# Patient Record
Sex: MALE | Race: WHITE | URBAN - METROPOLITAN AREA
[De-identification: names, ages, dates, MRNs, and addresses within clinical notes are randomized per-mention and may not be internally consistent; named-entity substitution may affect disease eponyms.]

---

## 2017-01-04 PROBLEM — R42 VERTIGO: Status: ACTIVE | Noted: 2017-01-04

## 2017-01-17 ENCOUNTER — APPOINTMENT (RX ONLY)
Dept: URBAN - METROPOLITAN AREA CLINIC 24 | Facility: CLINIC | Age: 72
Setting detail: DERMATOLOGY
End: 2017-01-17

## 2017-01-17 DIAGNOSIS — L57.0 ACTINIC KERATOSIS: ICD-10-CM

## 2017-01-17 DIAGNOSIS — D18.0 HEMANGIOMA: ICD-10-CM

## 2017-01-17 DIAGNOSIS — Z85.828 PERSONAL HISTORY OF OTHER MALIGNANT NEOPLASM OF SKIN: ICD-10-CM

## 2017-01-17 DIAGNOSIS — L81.4 OTHER MELANIN HYPERPIGMENTATION: ICD-10-CM

## 2017-01-17 PROBLEM — D18.01 HEMANGIOMA OF SKIN AND SUBCUTANEOUS TISSUE: Status: ACTIVE | Noted: 2017-01-17

## 2017-01-17 PROCEDURE — 99213 OFFICE O/P EST LOW 20 MIN: CPT | Mod: 25

## 2017-01-17 PROCEDURE — 17000 DESTRUCT PREMALG LESION: CPT

## 2017-01-17 PROCEDURE — ? LIQUID NITROGEN

## 2017-01-17 PROCEDURE — ? COUNSELING

## 2017-01-17 PROCEDURE — 17003 DESTRUCT PREMALG LES 2-14: CPT

## 2017-01-17 ASSESSMENT — LOCATION ZONE DERM
LOCATION ZONE: TRUNK
LOCATION ZONE: FACE
LOCATION ZONE: HAND
LOCATION ZONE: ARM
LOCATION ZONE: EAR

## 2017-01-17 ASSESSMENT — LOCATION SIMPLE DESCRIPTION DERM
LOCATION SIMPLE: RIGHT EAR
LOCATION SIMPLE: LEFT FOREHEAD
LOCATION SIMPLE: LEFT FOREARM
LOCATION SIMPLE: LEFT UPPER BACK
LOCATION SIMPLE: RIGHT FOREHEAD
LOCATION SIMPLE: LEFT CHEEK
LOCATION SIMPLE: LEFT HAND
LOCATION SIMPLE: RIGHT TEMPLE
LOCATION SIMPLE: UPPER BACK
LOCATION SIMPLE: ABDOMEN
LOCATION SIMPLE: RIGHT CHEEK
LOCATION SIMPLE: RIGHT HAND
LOCATION SIMPLE: LEFT TEMPLE

## 2017-01-17 ASSESSMENT — LOCATION DETAILED DESCRIPTION DERM
LOCATION DETAILED: LEFT ULNAR DORSAL HAND
LOCATION DETAILED: SUPERIOR THORACIC SPINE
LOCATION DETAILED: LEFT INFERIOR UPPER BACK
LOCATION DETAILED: LEFT RADIAL DORSAL HAND
LOCATION DETAILED: LEFT SUPERIOR MEDIAL FOREHEAD
LOCATION DETAILED: LEFT FOREHEAD
LOCATION DETAILED: RIGHT MID PREAURICULAR CHEEK
LOCATION DETAILED: LEFT DISTAL DORSAL FOREARM
LOCATION DETAILED: RIGHT RADIAL DORSAL HAND
LOCATION DETAILED: LEFT PROXIMAL DORSAL FOREARM
LOCATION DETAILED: LEFT SUPERIOR LATERAL MALAR CHEEK
LOCATION DETAILED: RIGHT SCAPHA
LOCATION DETAILED: LEFT CENTRAL TEMPLE
LOCATION DETAILED: EPIGASTRIC SKIN
LOCATION DETAILED: RIGHT INFERIOR FOREHEAD
LOCATION DETAILED: RIGHT SUPERIOR MEDIAL FOREHEAD
LOCATION DETAILED: RIGHT CENTRAL TEMPLE

## 2017-01-17 NOTE — PROCEDURE: LIQUID NITROGEN
Post-Care Instructions: I reviewed with the patient in detail post-care instructions. Patient is to wear sunprotection, and avoid picking at any of the treated lesions. Pt may apply Vaseline to crusted or scabbing areas.
Duration Of Freeze Thaw-Cycle (Seconds): 0
Render Post-Care Instructions In Note?: no
Consent: The patient's consent was obtained including but not limited to risks of crusting, scabbing, blistering, scarring, darker or lighter pigmentary change, recurrence, incomplete removal and infection.
Detail Level: Detailed

## 2017-02-06 PROBLEM — Z98.890 HISTORY OF TYMPANOPLASTY OF LEFT EAR: Status: ACTIVE | Noted: 2017-02-06

## 2017-02-07 PROBLEM — M54.41 ACUTE RIGHT-SIDED LOW BACK PAIN WITH RIGHT-SIDED SCIATICA: Status: ACTIVE | Noted: 2017-02-07

## 2017-02-07 PROBLEM — M54.42 ACUTE LEFT-SIDED LOW BACK PAIN WITH LEFT-SIDED SCIATICA: Status: ACTIVE | Noted: 2017-02-07

## 2017-03-02 PROBLEM — E11.9 CONTROLLED TYPE 2 DIABETES MELLITUS WITHOUT COMPLICATION, WITHOUT LONG-TERM CURRENT USE OF INSULIN (HCC): Status: ACTIVE | Noted: 2017-03-02

## 2017-03-13 PROBLEM — Z13.39 SCREENING FOR ALCOHOLISM: Status: ACTIVE | Noted: 2017-03-13

## 2017-06-13 PROBLEM — M54.42 ACUTE LEFT-SIDED LOW BACK PAIN WITH LEFT-SIDED SCIATICA: Status: RESOLVED | Noted: 2017-02-07 | Resolved: 2017-06-13

## 2017-06-13 PROBLEM — R00.2 HEART PALPITATIONS: Status: ACTIVE | Noted: 2017-06-13

## 2017-06-13 PROBLEM — H43.399 FLOATERS: Status: ACTIVE | Noted: 2017-06-13

## 2017-08-15 ENCOUNTER — APPOINTMENT (RX ONLY)
Dept: URBAN - METROPOLITAN AREA CLINIC 24 | Facility: CLINIC | Age: 72
Setting detail: DERMATOLOGY
End: 2017-08-15

## 2017-08-15 DIAGNOSIS — L81.4 OTHER MELANIN HYPERPIGMENTATION: ICD-10-CM

## 2017-08-15 DIAGNOSIS — Z85.828 PERSONAL HISTORY OF OTHER MALIGNANT NEOPLASM OF SKIN: ICD-10-CM

## 2017-08-15 DIAGNOSIS — L57.0 ACTINIC KERATOSIS: ICD-10-CM

## 2017-08-15 DIAGNOSIS — L82.1 OTHER SEBORRHEIC KERATOSIS: ICD-10-CM

## 2017-08-15 PROBLEM — E78.5 HYPERLIPIDEMIA, UNSPECIFIED: Status: ACTIVE | Noted: 2017-08-15

## 2017-08-15 PROCEDURE — ? COUNSELING

## 2017-08-15 PROCEDURE — 99213 OFFICE O/P EST LOW 20 MIN: CPT | Mod: 25

## 2017-08-15 PROCEDURE — ? LIQUID NITROGEN

## 2017-08-15 PROCEDURE — 17000 DESTRUCT PREMALG LESION: CPT

## 2017-08-15 PROCEDURE — 17003 DESTRUCT PREMALG LES 2-14: CPT

## 2017-08-15 ASSESSMENT — LOCATION SIMPLE DESCRIPTION DERM
LOCATION SIMPLE: RIGHT UPPER BACK
LOCATION SIMPLE: LEFT SCALP
LOCATION SIMPLE: RIGHT HAND
LOCATION SIMPLE: RIGHT FOREARM
LOCATION SIMPLE: LEFT FOREARM
LOCATION SIMPLE: RIGHT CHEEK
LOCATION SIMPLE: RIGHT ZYGOMA
LOCATION SIMPLE: RIGHT FOREHEAD
LOCATION SIMPLE: LEFT HAND
LOCATION SIMPLE: LEFT FOREHEAD
LOCATION SIMPLE: LEFT CHEEK

## 2017-08-15 ASSESSMENT — LOCATION DETAILED DESCRIPTION DERM
LOCATION DETAILED: RIGHT DISTAL DORSAL FOREARM
LOCATION DETAILED: LEFT DISTAL DORSAL FOREARM
LOCATION DETAILED: RIGHT CENTRAL ZYGOMA
LOCATION DETAILED: RIGHT LATERAL UPPER BACK
LOCATION DETAILED: LEFT PROXIMAL DORSAL FOREARM
LOCATION DETAILED: RIGHT ULNAR DORSAL HAND
LOCATION DETAILED: LEFT SUPERIOR PREAURICULAR CHEEK
LOCATION DETAILED: RIGHT FOREHEAD
LOCATION DETAILED: LEFT ULNAR DORSAL HAND
LOCATION DETAILED: LEFT MEDIAL FRONTAL SCALP
LOCATION DETAILED: RIGHT CENTRAL MALAR CHEEK
LOCATION DETAILED: LEFT INFERIOR LATERAL FOREHEAD
LOCATION DETAILED: LEFT RADIAL DORSAL HAND
LOCATION DETAILED: RIGHT SUPERIOR UPPER BACK
LOCATION DETAILED: LEFT FOREHEAD

## 2017-08-15 ASSESSMENT — LOCATION ZONE DERM
LOCATION ZONE: SCALP
LOCATION ZONE: ARM
LOCATION ZONE: FACE
LOCATION ZONE: HAND
LOCATION ZONE: TRUNK

## 2017-08-15 NOTE — PROCEDURE: MIPS QUALITY
Quality 111:Pneumonia Vaccination Status For Older Adults: Pneumococcal Vaccination not Administered or Previously Received, Reason not Otherwise Specified
Quality 110: Preventive Care And Screening: Influenza Immunization: Influenza Immunization previously received during influenza season
Quality 130: Documentation Of Current Medications In The Medical Record: Current Medications Documented
Detail Level: Detailed

## 2017-09-11 PROBLEM — H60.90 OTITIS EXTERNA: Status: ACTIVE | Noted: 2017-09-11

## 2017-09-19 PROBLEM — Z23 ENCOUNTER FOR IMMUNIZATION: Status: ACTIVE | Noted: 2017-09-19

## 2017-09-19 PROBLEM — E66.9 OBESITY (BMI 30.0-34.9): Status: ACTIVE | Noted: 2017-09-19

## 2017-12-19 PROBLEM — H60.90 OTITIS EXTERNA: Status: RESOLVED | Noted: 2017-09-11 | Resolved: 2017-12-19

## 2017-12-19 PROBLEM — N40.0 BENIGN PROSTATIC HYPERPLASIA WITHOUT LOWER URINARY TRACT SYMPTOMS: Status: ACTIVE | Noted: 2017-12-19

## 2018-02-15 ENCOUNTER — APPOINTMENT (RX ONLY)
Dept: URBAN - METROPOLITAN AREA CLINIC 24 | Facility: CLINIC | Age: 73
Setting detail: DERMATOLOGY
End: 2018-02-15

## 2018-02-15 DIAGNOSIS — L82.1 OTHER SEBORRHEIC KERATOSIS: ICD-10-CM

## 2018-02-15 DIAGNOSIS — L57.0 ACTINIC KERATOSIS: ICD-10-CM

## 2018-02-15 DIAGNOSIS — D485 NEOPLASM OF UNCERTAIN BEHAVIOR OF SKIN: ICD-10-CM

## 2018-02-15 DIAGNOSIS — Z85.828 PERSONAL HISTORY OF OTHER MALIGNANT NEOPLASM OF SKIN: ICD-10-CM

## 2018-02-15 PROBLEM — D48.5 NEOPLASM OF UNCERTAIN BEHAVIOR OF SKIN: Status: ACTIVE | Noted: 2018-02-15

## 2018-02-15 PROCEDURE — ? BIOPSY BY SHAVE METHOD

## 2018-02-15 PROCEDURE — ? LIQUID NITROGEN

## 2018-02-15 PROCEDURE — 99213 OFFICE O/P EST LOW 20 MIN: CPT | Mod: 25

## 2018-02-15 PROCEDURE — ? COUNSELING

## 2018-02-15 PROCEDURE — 17003 DESTRUCT PREMALG LES 2-14: CPT

## 2018-02-15 PROCEDURE — 17000 DESTRUCT PREMALG LESION: CPT

## 2018-02-15 PROCEDURE — 11100: CPT | Mod: 59

## 2018-02-15 ASSESSMENT — LOCATION DETAILED DESCRIPTION DERM
LOCATION DETAILED: RIGHT SUPERIOR MEDIAL FOREHEAD
LOCATION DETAILED: LEFT LATERAL INFERIOR CHEST
LOCATION DETAILED: RIGHT CENTRAL ZYGOMA
LOCATION DETAILED: LEFT LATERAL ZYGOMA
LOCATION DETAILED: LEFT FOREHEAD
LOCATION DETAILED: RIGHT SUPERIOR UPPER BACK
LOCATION DETAILED: LEFT CENTRAL TEMPLE
LOCATION DETAILED: LEFT LATERAL MALAR CHEEK
LOCATION DETAILED: RIGHT INFERIOR LATERAL FOREHEAD
LOCATION DETAILED: RIGHT SUPERIOR FOREHEAD
LOCATION DETAILED: RIGHT INFERIOR FOREHEAD
LOCATION DETAILED: LEFT SUPERIOR TEMPLE
LOCATION DETAILED: LEFT SUPERIOR MEDIAL FOREHEAD
LOCATION DETAILED: LEFT ULNAR DORSAL HAND

## 2018-02-15 ASSESSMENT — LOCATION SIMPLE DESCRIPTION DERM
LOCATION SIMPLE: RIGHT FOREHEAD
LOCATION SIMPLE: LEFT HAND
LOCATION SIMPLE: LEFT ZYGOMA
LOCATION SIMPLE: RIGHT ZYGOMA
LOCATION SIMPLE: CHEST
LOCATION SIMPLE: LEFT FOREHEAD
LOCATION SIMPLE: LEFT TEMPLE
LOCATION SIMPLE: LEFT CHEEK
LOCATION SIMPLE: RIGHT UPPER BACK

## 2018-02-15 ASSESSMENT — LOCATION ZONE DERM
LOCATION ZONE: FACE
LOCATION ZONE: TRUNK
LOCATION ZONE: HAND

## 2018-02-15 NOTE — PROCEDURE: BIOPSY BY SHAVE METHOD
Notification Instructions: Patient will be notified of biopsy results. However, patient instructed to call the office if not contacted within 2 weeks.
Billing Type: Third-Party Bill
Biopsy Method: Dermablade
Hemostasis: Aluminum Chloride
Additional Anesthesia Volume In Cc (Will Not Render If 0): 0
Accession #: [id number]
Dressing: bandage
Cryotherapy Text: The wound bed was treated with cryotherapy after the biopsy was performed.
Biopsy Type: H and E
Electrodesiccation Text: The wound bed was treated with electrodesiccation after the biopsy was performed.
Type Of Destruction Used: Curettage
Bill For Surgical Tray: no
Electrodesiccation And Curettage Text: The wound bed was treated with electrodesiccation and curettage after the biopsy was performed.
Detail Level: Detailed
Consent: Written consent was obtained and risks were reviewed including but not limited to scarring, infection, bleeding, scabbing, incomplete removal, and allergy to anesthesia.
Anesthesia Volume In Cc: 0.3
Post-care instructions were reviewed in detail and written instructions are provided. Patient is to keep the biopsy site dry overnight, and then apply bacitracin twice daily until healed. Patient may apply hydrogen peroxide soaks to remove any crusting.
Silver Nitrate Text: The wound bed was treated with silver nitrate after the biopsy was performed.
Wound Care: Vaseline
Anesthesia Type: 1% lidocaine with epinephrine

## 2018-03-19 ENCOUNTER — HOSPITAL ENCOUNTER (OUTPATIENT)
Dept: GENERAL RADIOLOGY | Age: 73
Discharge: HOME | End: 2018-03-19
Payer: MEDICARE

## 2018-03-19 DIAGNOSIS — M25.562 PAIN IN LATERAL PORTION OF LEFT KNEE: ICD-10-CM

## 2018-03-19 PROCEDURE — 73562 X-RAY EXAM OF KNEE 3: CPT

## 2018-03-19 NOTE — PROGRESS NOTES
XR looks OK other than some arthritis, the next step would be an ultrasound, or we can wait and see how it goes, it's up to him.

## 2018-03-29 ENCOUNTER — APPOINTMENT (RX ONLY)
Dept: URBAN - METROPOLITAN AREA CLINIC 24 | Facility: CLINIC | Age: 73
Setting detail: DERMATOLOGY
End: 2018-03-29

## 2018-03-29 PROBLEM — D04.5 CARCINOMA IN SITU OF SKIN OF TRUNK: Status: ACTIVE | Noted: 2018-03-29

## 2018-03-29 PROCEDURE — 11602 EXC TR-EXT MAL+MARG 1.1-2 CM: CPT

## 2018-03-29 PROCEDURE — 12032 INTMD RPR S/A/T/EXT 2.6-7.5: CPT

## 2018-03-29 PROCEDURE — ? EXCISION

## 2018-03-29 NOTE — PROCEDURE: EXCISION
Dressing: pressure dressing
H Plasty Text: Given the location of the defect, shape of the defect and the proximity to free margins a H-plasty was deemed most appropriate for repair.  Using a sterile surgical marker, the appropriate advancement arms of the H-plasty were drawn incorporating the defect and placing the expected incisions within the relaxed skin tension lines where possible. The area thus outlined was incised deep to adipose tissue with a #15 scalpel blade. The skin margins were undermined to an appropriate distance in all directions utilizing iris scissors.  The opposing advancement arms were then advanced into place in opposite direction and anchored with interrupted buried subcutaneous sutures.
Island Pedicle Flap With Canthal Suspension Text: The defect edges were debeveled with a #15 scalpel blade.  Given the location of the defect, shape of the defect and the proximity to free margins an island pedicle advancement flap was deemed most appropriate.  Using a sterile surgical marker, an appropriate advancement flap was drawn incorporating the defect, outlining the appropriate donor tissue and placing the expected incisions within the relaxed skin tension lines where possible. The area thus outlined was incised deep to adipose tissue with a #15 scalpel blade.  The skin margins were undermined to an appropriate distance in all directions around the primary defect and laterally outward around the island pedicle utilizing iris scissors.  There was minimal undermining beneath the pedicle flap. A suspension suture was placed in the canthal tendon to prevent tension and prevent ectropion.
Lip Wedge Excision Repair Text: Given the location of the defect and the proximity to free margins a full thickness wedge repair was deemed most appropriate.  Using a sterile surgical marker, the appropriate repair was drawn incorporating the defect and placing the expected incisions perpendicular to the vermillion border.  The vermillion border was also meticulously outlined to ensure appropriate reapproximation during the repair.  The area thus outlined was incised through and through with a #15 scalpel blade.  The muscularis and dermis were reaproximated with deep sutures following hemostasis. Care was taken to realign the vermillion border before proceeding with the superficial closure.  Once the vermillion was realigned the superfical and mucosal closure was finished.
Bilateral Helical Rim Advancement Flap Text: The defect edges were debeveled with a #15 blade scalpel.  Given the location of the defect and the proximity to free margins (helical rim) a bilateral helical rim advancement flap was deemed most appropriate.  Using a sterile surgical marker, the appropriate advancement flaps were drawn incorporating the defect and placing the expected incisions between the helical rim and antihelix where possible.  The area thus outlined was incised through and through with a #15 scalpel blade.  With a skin hook and iris scissors, the flaps were gently and sharply undermined and freed up.
Primary Defect Length (In Cm): 0
Trilobed Flap Text: The defect edges were debeveled with a #15 scalpel blade.  Given the location of the defect and the proximity to free margins a trilobed flap was deemed most appropriate.  Using a sterile surgical marker, an appropriate trilobed flap drawn around the defect.    The area thus outlined was incised deep to adipose tissue with a #15 scalpel blade.  The skin margins were undermined to an appropriate distance in all directions utilizing iris scissors.
Anesthesia Type: 1% lidocaine with epinephrine
Show Accession Variable: Yes
Cheek Interpolation Flap Text: A decision was made to reconstruct the defect utilizing an interpolation axial flap and a staged reconstruction.  A telfa template was made of the defect.  This telfa template was then used to outline the Cheek Interpolation flap.  The donor area for the pedicle flap was then injected with anesthesia.  The flap was excised through the skin and subcutaneous tissue down to the layer of the underlying musculature.  The interpolation flap was carefully excised within this deep plane to maintain its blood supply.  The edges of the donor site were undermined.   The donor site was closed in a primary fashion.  The pedicle was then rotated into position and sutured.  Once the tube was sutured into place, adequate blood supply was confirmed with blanching and refill.  The pedicle was then wrapped with xeroform gauze and dressed appropriately with a telfa and gauze bandage to ensure continued blood supply and protect the attached pedicle.
Partial Purse String (Simple) Text: Given the location of the defect and the characteristics of the surrounding skin a simple purse string closure was deemed most appropriate.  Undermining was performed circumferentially around the surgical defect.  A purse string suture was then placed and tightened. Wound tension of the circular defect prevented complete closure of the wound.
Accession #: [id number]
Modified Advancement Flap Text: The defect edges were debeveled with a #15 scalpel blade.  Given the location of the defect, shape of the defect and the proximity to free margins a modified advancement flap was deemed most appropriate.  Using a sterile surgical marker, an appropriate advancement flap was drawn incorporating the defect and placing the expected incisions within the relaxed skin tension lines where possible.    The area thus outlined was incised deep to adipose tissue with a #15 scalpel blade.  The skin margins were undermined to an appropriate distance in all directions utilizing iris scissors.
O-T Plasty Text: The defect edges were debeveled with a #15 scalpel blade.  Given the location of the defect, shape of the defect and the proximity to free margins an O-T plasty was deemed most appropriate.  Using a sterile surgical marker, an appropriate O-T plasty was drawn incorporating the defect and placing the expected incisions within the relaxed skin tension lines where possible.    The area thus outlined was incised deep to adipose tissue with a #15 scalpel blade.  The skin margins were undermined to an appropriate distance in all directions utilizing iris scissors.
Path Notes (To The Dermatopathologist): Please check margins
Z Plasty Text: The lesion was extirpated to the level of the fat with a #15 scalpel blade.  Given the location of the defect, shape of the defect and the proximity to free margins a Z-plasty was deemed most appropriate for repair.  Using a sterile surgical marker, the appropriate transposition arms of the Z-plasty were drawn incorporating the defect and placing the expected incisions within the relaxed skin tension lines where possible.    The area thus outlined was incised deep to adipose tissue with a #15 scalpel blade.  The skin margins were undermined to an appropriate distance in all directions utilizing iris scissors.  The opposing transposition arms were then transposed into place in opposite direction and anchored with interrupted buried subcutaneous sutures.
Partial Purse String (Intermediate) Text: Given the location of the defect and the characteristics of the surrounding skin an intermediate purse string closure was deemed most appropriate.  Undermining was performed circumferentially around the surgical defect.  A purse string suture was then placed and tightened. Wound tension of the circular defect prevented complete closure of the wound.
Tissue Cultured Epidermal Autograft Text: The defect edges were debeveled with a #15 scalpel blade.  Given the location of the defect, shape of the defect and the proximity to free margins a tissue cultured epidermal autograft was deemed most appropriate.  The graft was then trimmed to fit the size of the defect.  The graft was then placed in the primary defect and oriented appropriately.
V-Y Plasty Text: The defect edges were debeveled with a #15 scalpel blade.  Given the location of the defect, shape of the defect and the proximity to free margins an V-Y advancement flap was deemed most appropriate.  Using a sterile surgical marker, an appropriate advancement flap was drawn incorporating the defect and placing the expected incisions within the relaxed skin tension lines where possible.    The area thus outlined was incised deep to adipose tissue with a #15 scalpel blade.  The skin margins were undermined to an appropriate distance in all directions utilizing iris scissors.
Complex Repair And Double M Plasty Text: The defect edges were debeveled with a #15 scalpel blade.  The primary defect was closed partially with a complex linear closure.  Given the location of the remaining defect, shape of the defect and the proximity to free margins a double M plasty was deemed most appropriate for complete closure of the defect.  Using a sterile surgical marker, an appropriate advancement flap was drawn incorporating the defect and placing the expected incisions within the relaxed skin tension lines where possible.    The area thus outlined was incised deep to adipose tissue with a #15 scalpel blade.  The skin margins were undermined to an appropriate distance in all directions utilizing iris scissors.
Advancement Flap (Single) Text: The defect edges were debeveled with a #15 scalpel blade.  Given the location of the defect and the proximity to free margins a single advancement flap was deemed most appropriate.  Using a sterile surgical marker, an appropriate advancement flap was drawn incorporating the defect and placing the expected incisions within the relaxed skin tension lines where possible.    The area thus outlined was incised deep to adipose tissue with a #15 scalpel blade.  The skin margins were undermined to an appropriate distance in all directions utilizing iris scissors.
Complex Repair And O-L Flap Text: The defect edges were debeveled with a #15 scalpel blade.  The primary defect was closed partially with a complex linear closure.  Given the location of the remaining defect, shape of the defect and the proximity to free margins an O-L flap was deemed most appropriate for complete closure of the defect.  Using a sterile surgical marker, an appropriate flap was drawn incorporating the defect and placing the expected incisions within the relaxed skin tension lines where possible.    The area thus outlined was incised deep to adipose tissue with a #15 scalpel blade.  The skin margins were undermined to an appropriate distance in all directions utilizing iris scissors.
Purse String (Simple) Text: Given the location of the defect and the characteristics of the surrounding skin a purse string simple closure was deemed most appropriate.  Undermining was performed circumferentially around the surgical defect.  A purse string suture was then placed and tightened.
Complex Repair And Modified Advancement Flap Text: The defect edges were debeveled with a #15 scalpel blade.  The primary defect was closed partially with a complex linear closure.  Given the location of the remaining defect, shape of the defect and the proximity to free margins a modified advancement flap was deemed most appropriate for complete closure of the defect.  Using a sterile surgical marker, an appropriate advancement flap was drawn incorporating the defect and placing the expected incisions within the relaxed skin tension lines where possible.    The area thus outlined was incised deep to adipose tissue with a #15 scalpel blade.  The skin margins were undermined to an appropriate distance in all directions utilizing iris scissors.
Scalpel Size: 15 blade
Spiral Flap Text: The defect edges were debeveled with a #15 scalpel blade.  Given the location of the defect, shape of the defect and the proximity to free margins a spiral flap was deemed most appropriate.  Using a sterile surgical marker, an appropriate rotation flap was drawn incorporating the defect and placing the expected incisions within the relaxed skin tension lines where possible. The area thus outlined was incised deep to adipose tissue with a #15 scalpel blade.  The skin margins were undermined to an appropriate distance in all directions utilizing iris scissors.
Complex Repair And Xenograft Text: The defect edges were debeveled with a #15 scalpel blade.  The primary defect was closed partially with a complex linear closure.  Given the location of the defect, shape of the defect and the proximity to free margins an tissue cultured epidermal autograft was deemed most appropriate to repair the remaining defect.  The graft was trimmed to fit the size of the remaining defect.  The graft was then placed in the primary defect, oriented appropriately, and sutured into place.
Complex Repair And Split-Thickness Skin Graft Text: The defect edges were debeveled with a #15 scalpel blade.  The primary defect was closed partially with a complex linear closure.  Given the location of the defect, shape of the defect and the proximity to free margins a split thickness skin graft was deemed most appropriate to repair the remaining defect.  The graft was trimmed to fit the size of the remaining defect.  The graft was then placed in the primary defect, oriented appropriately, and sutured into place.
O-L Flap Text: The defect edges were debeveled with a #15 scalpel blade.  Given the location of the defect, shape of the defect and the proximity to free margins an O-L flap was deemed most appropriate.  Using a sterile surgical marker, an appropriate advancement flap was drawn incorporating the defect and placing the expected incisions within the relaxed skin tension lines where possible.    The area thus outlined was incised deep to adipose tissue with a #15 scalpel blade.  The skin margins were undermined to an appropriate distance in all directions utilizing iris scissors.
Complex Repair And W Plasty Text: The defect edges were debeveled with a #15 scalpel blade.  The primary defect was closed partially with a complex linear closure.  Given the location of the remaining defect, shape of the defect and the proximity to free margins a W plasty was deemed most appropriate for complete closure of the defect.  Using a sterile surgical marker, an appropriate advancement flap was drawn incorporating the defect and placing the expected incisions within the relaxed skin tension lines where possible.    The area thus outlined was incised deep to adipose tissue with a #15 scalpel blade.  The skin margins were undermined to an appropriate distance in all directions utilizing iris scissors.
Intermediate Repair Preamble Text (Leave Blank If You Do Not Want): Undermining was performed with blunt dissection.
Island Pedicle Flap Text: The defect edges were debeveled with a #15 scalpel blade.  Given the location of the defect, shape of the defect and the proximity to free margins an island pedicle advancement flap was deemed most appropriate.  Using a sterile surgical marker, an appropriate advancement flap was drawn incorporating the defect, outlining the appropriate donor tissue and placing the expected incisions within the relaxed skin tension lines where possible.    The area thus outlined was incised deep to adipose tissue with a #15 scalpel blade.  The skin margins were undermined to an appropriate distance in all directions around the primary defect and laterally outward around the island pedicle utilizing iris scissors.  There was minimal undermining beneath the pedicle flap.
O-Z Plasty Text: The defect edges were debeveled with a #15 scalpel blade.  Given the location of the defect, shape of the defect and the proximity to free margins an O-Z plasty (double transposition flap) was deemed most appropriate.  Using a sterile surgical marker, the appropriate transposition flaps were drawn incorporating the defect and placing the expected incisions within the relaxed skin tension lines where possible.    The area thus outlined was incised deep to adipose tissue with a #15 scalpel blade.  The skin margins were undermined to an appropriate distance in all directions utilizing iris scissors.  Hemostasis was achieved with electrocautery.  The flaps were then transposed into place, one clockwise and the other counterclockwise, and anchored with interrupted buried subcutaneous sutures.
Melolabial Interpolation Flap Text: A decision was made to reconstruct the defect utilizing an interpolation axial flap and a staged reconstruction.  A telfa template was made of the defect.  This telfa template was then used to outline the melolabial interpolation flap.  The donor area for the pedicle flap was then injected with anesthesia.  The flap was excised through the skin and subcutaneous tissue down to the layer of the underlying musculature.  The pedicle flap was carefully excised within this deep plane to maintain its blood supply.  The edges of the donor site were undermined.   The donor site was closed in a primary fashion.  The pedicle was then rotated into position and sutured.  Once the tube was sutured into place, adequate blood supply was confirmed with blanching and refill.  The pedicle was then wrapped with xeroform gauze and dressed appropriately with a telfa and gauze bandage to ensure continued blood supply and protect the attached pedicle.
Render Path Notes In Note?: no
Complex Repair And A-T Advancement Flap Text: The defect edges were debeveled with a #15 scalpel blade.  The primary defect was closed partially with a complex linear closure.  Given the location of the remaining defect, shape of the defect and the proximity to free margins an A-T advancement flap was deemed most appropriate for complete closure of the defect.  Using a sterile surgical marker, an appropriate advancement flap was drawn incorporating the defect and placing the expected incisions within the relaxed skin tension lines where possible.    The area thus outlined was incised deep to adipose tissue with a #15 scalpel blade.  The skin margins were undermined to an appropriate distance in all directions utilizing iris scissors.
Skin Substitute Text: The defect edges were debeveled with a #15 scalpel blade.  Given the location of the defect, shape of the defect and the proximity to free margins a skin substitute graft was deemed most appropriate.  The graft material was trimmed to fit the size of the defect. The graft was then placed in the primary defect and oriented appropriately.
Intermediate / Complex Repair - Final Wound Length In Cm: 5.2
Dorsal Nasal Flap Text: The defect edges were debeveled with a #15 scalpel blade.  Given the location of the defect and the proximity to free margins a dorsal nasal flap was deemed most appropriate.  Using a sterile surgical marker, an appropriate dorsal nasal flap was drawn around the defect.    The area thus outlined was incised deep to adipose tissue with a #15 scalpel blade.  The skin margins were undermined to an appropriate distance in all directions utilizing iris scissors.
Complex Repair And Dorsal Nasal Flap Text: The defect edges were debeveled with a #15 scalpel blade.  The primary defect was closed partially with a complex linear closure.  Given the location of the remaining defect, shape of the defect and the proximity to free margins a dorsal nasal flap was deemed most appropriate for complete closure of the defect.  Using a sterile surgical marker, an appropriate flap was drawn incorporating the defect and placing the expected incisions within the relaxed skin tension lines where possible.    The area thus outlined was incised deep to adipose tissue with a #15 scalpel blade.  The skin margins were undermined to an appropriate distance in all directions utilizing iris scissors.
Epidermal Autograft Text: The defect edges were debeveled with a #15 scalpel blade.  Given the location of the defect, shape of the defect and the proximity to free margins an epidermal autograft was deemed most appropriate.  Using a sterile surgical marker, the primary defect shape was transferred to the donor site. The epidermal graft was then harvested.  The skin graft was then placed in the primary defect and oriented appropriately.
Complex Repair And O-T Advancement Flap Text: The defect edges were debeveled with a #15 scalpel blade.  The primary defect was closed partially with a complex linear closure.  Given the location of the remaining defect, shape of the defect and the proximity to free margins an O-T advancement flap was deemed most appropriate for complete closure of the defect.  Using a sterile surgical marker, an appropriate advancement flap was drawn incorporating the defect and placing the expected incisions within the relaxed skin tension lines where possible.    The area thus outlined was incised deep to adipose tissue with a #15 scalpel blade.  The skin margins were undermined to an appropriate distance in all directions utilizing iris scissors.
Bi-Rhombic Flap Text: The defect edges were debeveled with a #15 scalpel blade.  Given the location of the defect and the proximity to free margins a bi-rhombic flap was deemed most appropriate.  Using a sterile surgical marker, an appropriate rhombic flap was drawn incorporating the defect. The area thus outlined was incised deep to adipose tissue with a #15 scalpel blade.  The skin margins were undermined to an appropriate distance in all directions utilizing iris scissors.
Hatchet Flap Text: The defect edges were debeveled with a #15 scalpel blade.  Given the location of the defect, shape of the defect and the proximity to free margins a hatchet flap was deemed most appropriate.  Using a sterile surgical marker, an appropriate hatchet flap was drawn incorporating the defect and placing the expected incisions within the relaxed skin tension lines where possible.    The area thus outlined was incised deep to adipose tissue with a #15 scalpel blade.  The skin margins were undermined to an appropriate distance in all directions utilizing iris scissors.
Crescentic Advancement Flap Text: The defect edges were debeveled with a #15 scalpel blade.  Given the location of the defect and the proximity to free margins a crescentic advancement flap was deemed most appropriate.  Using a sterile surgical marker, the appropriate advancement flap was drawn incorporating the defect and placing the expected incisions within the relaxed skin tension lines where possible.    The area thus outlined was incised deep to adipose tissue with a #15 scalpel blade.  The skin margins were undermined to an appropriate distance in all directions utilizing iris scissors.
Ftsg Text: The defect edges were debeveled with a #15 scalpel blade.  Given the location of the defect, shape of the defect and the proximity to free margins a full thickness skin graft was deemed most appropriate.  Using a sterile surgical marker, the primary defect shape was transferred to the donor site. The area thus outlined was incised deep to adipose tissue with a #15 scalpel blade.  The harvested graft was then trimmed of adipose tissue until only dermis and epidermis was left.  The skin margins of the secondary defect were undermined to an appropriate distance in all directions utilizing iris scissors.  The secondary defect was closed with interrupted buried subcutaneous sutures.  The skin edges were then re-apposed with running  sutures.  The skin graft was then placed in the primary defect and oriented appropriately.
Saucerization Excision Additional Text (Leave Blank If You Do Not Want): The margin was drawn around the clinically apparent lesion.  Incisions were then made along these lines, in a tangential fashion, to the appropriate tissue plane and the lesion was extirpated.
V-Y Flap Text: The defect edges were debeveled with a #15 scalpel blade.  Given the location of the defect, shape of the defect and the proximity to free margins a V-Y flap was deemed most appropriate.  Using a sterile surgical marker, an appropriate advancement flap was drawn incorporating the defect and placing the expected incisions within the relaxed skin tension lines where possible.    The area thus outlined was incised deep to adipose tissue with a #15 scalpel blade.  The skin margins were undermined to an appropriate distance in all directions utilizing iris scissors.
Complex Repair And Epidermal Autograft Text: The defect edges were debeveled with a #15 scalpel blade.  The primary defect was closed partially with a complex linear closure.  Given the location of the defect, shape of the defect and the proximity to free margins an epidermal autograft was deemed most appropriate to repair the remaining defect.  The graft was trimmed to fit the size of the remaining defect.  The graft was then placed in the primary defect, oriented appropriately, and sutured into place.
Advancement Flap (Double) Text: The defect edges were debeveled with a #15 scalpel blade.  Given the location of the defect and the proximity to free margins a double advancement flap was deemed most appropriate.  Using a sterile surgical marker, the appropriate advancement flaps were drawn incorporating the defect and placing the expected incisions within the relaxed skin tension lines where possible.    The area thus outlined was incised deep to adipose tissue with a #15 scalpel blade.  The skin margins were undermined to an appropriate distance in all directions utilizing iris scissors.
Hemostasis: Electrocautery
Complex Repair And M Plasty Text: The defect edges were debeveled with a #15 scalpel blade.  The primary defect was closed partially with a complex linear closure.  Given the location of the remaining defect, shape of the defect and the proximity to free margins an M plasty was deemed most appropriate for complete closure of the defect.  Using a sterile surgical marker, an appropriate advancement flap was drawn incorporating the defect and placing the expected incisions within the relaxed skin tension lines where possible.    The area thus outlined was incised deep to adipose tissue with a #15 scalpel blade.  The skin margins were undermined to an appropriate distance in all directions utilizing iris scissors.
Melolabial Transposition Flap Text: The defect edges were debeveled with a #15 scalpel blade.  Given the location of the defect and the proximity to free margins a melolabial flap was deemed most appropriate.  Using a sterile surgical marker, an appropriate melolabial transposition flap was drawn incorporating the defect.    The area thus outlined was incised deep to adipose tissue with a #15 scalpel blade.  The skin margins were undermined to an appropriate distance in all directions utilizing iris scissors.
Slit Excision Additional Text (Leave Blank If You Do Not Want): A linear line was drawn on the skin overlying the lesion. An incision was made slowly until the lesion was visualized.  Once visualized, the lesion was removed with blunt dissection.
O-T Advancement Flap Text: The defect edges were debeveled with a #15 scalpel blade.  Given the location of the defect, shape of the defect and the proximity to free margins an O-T advancement flap was deemed most appropriate.  Using a sterile surgical marker, an appropriate advancement flap was drawn incorporating the defect and placing the expected incisions within the relaxed skin tension lines where possible.    The area thus outlined was incised deep to adipose tissue with a #15 scalpel blade.  The skin margins were undermined to an appropriate distance in all directions utilizing iris scissors.
Complex Repair Preamble Text (Leave Blank If You Do Not Want): Extensive wide undermining was performed.
Cartilage Graft Text: The defect edges were debeveled with a #15 scalpel blade.  Given the location of the defect, shape of the defect, the fact the defect involved a full thickness cartilage defect a cartilage graft was deemed most appropriate.  An appropriate donor site was identified, cleansed, and anesthetized. The cartilage graft was then harvested and transferred to the recipient site, oriented appropriately and then sutured into place.  The secondary defect was then repaired using a primary closure.
Anesthesia Volume In Cc: 5
Rotation Flap Text: The defect edges were debeveled with a #15 scalpel blade.  Given the location of the defect, shape of the defect and the proximity to free margins a rotation flap was deemed most appropriate.  Using a sterile surgical marker, an appropriate rotation flap was drawn incorporating the defect and placing the expected incisions within the relaxed skin tension lines where possible.    The area thus outlined was incised deep to adipose tissue with a #15 scalpel blade.  The skin margins were undermined to an appropriate distance in all directions utilizing iris scissors.
Alar Island Pedicle Flap Text: The defect edges were debeveled with a #15 scalpel blade.  Given the location of the defect, shape of the defect and the proximity to the alar rim an island pedicle advancement flap was deemed most appropriate.  Using a sterile surgical marker, an appropriate advancement flap was drawn incorporating the defect, outlining the appropriate donor tissue and placing the expected incisions within the nasal ala running parallel to the alar rim. The area thus outlined was incised with a #15 scalpel blade.  The skin margins were undermined minimally to an appropriate distance in all directions around the primary defect and laterally outward around the island pedicle utilizing iris scissors.  There was minimal undermining beneath the pedicle flap.
Pre-Excision Curettage Text (Leave Blank If You Do Not Want): Prior to drawing the surgical margin the visible lesion was removed with electrodesiccation and curettage to clearly define the lesion size.
Positioning (Leave Blank If You Do Not Want): The patient was placed in a comfortable position exposing the surgical site.
Island Pedicle Flap-Requiring Vessel Identification Text: The defect edges were debeveled with a #15 scalpel blade.  Given the location of the defect, shape of the defect and the proximity to free margins an island pedicle advancement flap was deemed most appropriate.  Using a sterile surgical marker, an appropriate advancement flap was drawn, based on the axial vessel mentioned above, incorporating the defect, outlining the appropriate donor tissue and placing the expected incisions within the relaxed skin tension lines where possible.    The area thus outlined was incised deep to adipose tissue with a #15 scalpel blade.  The skin margins were undermined to an appropriate distance in all directions around the primary defect and laterally outward around the island pedicle utilizing iris scissors.  There was minimal undermining beneath the pedicle flap.
Curvilinear Excision Additional Text (Leave Blank If You Do Not Want): The margin was drawn around the clinically apparent lesion.  A curvilinear shape was then drawn on the skin incorporating the lesion and margins.  Incisions were then made along these lines to the appropriate tissue plane and the lesion was extirpated.
Helical Rim Advancement Flap Text: The defect edges were debeveled with a #15 blade scalpel.  Given the location of the defect and the proximity to free margins (helical rim) a double helical rim advancement flap was deemed most appropriate.  Using a sterile surgical marker, the appropriate advancement flaps were drawn incorporating the defect and placing the expected incisions between the helical rim and antihelix where possible.  The area thus outlined was incised through and through with a #15 scalpel blade.  With a skin hook and iris scissors, the flaps were gently and sharply undermined and freed up.
Estimated Blood Loss (Cc): minimal
Wound Care: Vaseline
Detail Level: Detailed
Complex Repair And Rotation Flap Text: The defect edges were debeveled with a #15 scalpel blade.  The primary defect was closed partially with a complex linear closure.  Given the location of the remaining defect, shape of the defect and the proximity to free margins a rotation flap was deemed most appropriate for complete closure of the defect.  Using a sterile surgical marker, an appropriate advancement flap was drawn incorporating the defect and placing the expected incisions within the relaxed skin tension lines where possible.    The area thus outlined was incised deep to adipose tissue with a #15 scalpel blade.  The skin margins were undermined to an appropriate distance in all directions utilizing iris scissors.
Additional Primary Defect Width (In Cm): -
Mastoid Interpolation Flap Text: A decision was made to reconstruct the defect utilizing an interpolation axial flap and a staged reconstruction.  A telfa template was made of the defect.  This telfa template was then used to outline the mastoid interpolation flap.  The donor area for the pedicle flap was then injected with anesthesia.  The flap was excised through the skin and subcutaneous tissue down to the layer of the underlying musculature.  The pedicle flap was carefully excised within this deep plane to maintain its blood supply.  The edges of the donor site were undermined.   The donor site was closed in a primary fashion.  The pedicle was then rotated into position and sutured.  Once the tube was sutured into place, adequate blood supply was confirmed with blanching and refill.  The pedicle was then wrapped with xeroform gauze and dressed appropriately with a telfa and gauze bandage to ensure continued blood supply and protect the attached pedicle.
Excisional Biopsy Additional Text (Leave Blank If You Do Not Want): The margin was drawn around the clinically apparent lesion.  An elliptical shape was then drawn on the skin incorporating the lesion and margins.  Incisions were then made along these lines to the appropriate tissue plane and the lesion was extirpated.
Complex Repair And Ftsg Text: The defect edges were debeveled with a #15 scalpel blade.  The primary defect was closed partially with a complex linear closure.  Given the location of the defect, shape of the defect and the proximity to free margins a full thickness skin graft was deemed most appropriate to repair the remaining defect.  The graft was trimmed to fit the size of the remaining defect.  The graft was then placed in the primary defect, oriented appropriately, and sutured into place.
Rhombic Flap Text: The defect edges were debeveled with a #15 scalpel blade.  Given the location of the defect and the proximity to free margins a rhombic flap was deemed most appropriate.  Using a sterile surgical marker, an appropriate rhombic flap was drawn incorporating the defect.    The area thus outlined was incised deep to adipose tissue with a #15 scalpel blade.  The skin margins were undermined to an appropriate distance in all directions utilizing iris scissors.
Complex Repair And V-Y Plasty Text: The defect edges were debeveled with a #15 scalpel blade.  The primary defect was closed partially with a complex linear closure.  Given the location of the remaining defect, shape of the defect and the proximity to free margins a V-Y plasty was deemed most appropriate for complete closure of the defect.  Using a sterile surgical marker, an appropriate advancement flap was drawn incorporating the defect and placing the expected incisions within the relaxed skin tension lines where possible.    The area thus outlined was incised deep to adipose tissue with a #15 scalpel blade.  The skin margins were undermined to an appropriate distance in all directions utilizing iris scissors.
Complex Repair And Melolabial Flap Text: The defect edges were debeveled with a #15 scalpel blade.  The primary defect was closed partially with a complex linear closure.  Given the location of the remaining defect, shape of the defect and the proximity to free margins a melolabial flap was deemed most appropriate for complete closure of the defect.  Using a sterile surgical marker, an appropriate advancement flap was drawn incorporating the defect and placing the expected incisions within the relaxed skin tension lines where possible.    The area thus outlined was incised deep to adipose tissue with a #15 scalpel blade.  The skin margins were undermined to an appropriate distance in all directions utilizing iris scissors.
Keystone Flap Text: The defect edges were debeveled with a #15 scalpel blade.  Given the location of the defect, shape of the defect a keystone flap was deemed most appropriate.  Using a sterile surgical marker, an appropriate keystone flap was drawn incorporating the defect, outlining the appropriate donor tissue and placing the expected incisions within the relaxed skin tension lines where possible. The area thus outlined was incised deep to adipose tissue with a #15 scalpel blade.  The skin margins were undermined to an appropriate distance in all directions around the primary defect and laterally outward around the flap utilizing iris scissors.
Post-Care Instructions: I reviewed with the patient in detail post-care instructions. Patient is not to engage in any heavy lifting, exercise, or swimming for the next 7 days. Should the patient develop any fevers, chills, bleeding, severe pain patient will contact the office immediately.
Size Of Lesion In Cm: 1.1
Billing Type: Third-Party Bill
W Plasty Text: The lesion was extirpated to the level of the fat with a #15 scalpel blade.  Given the location of the defect, shape of the defect and the proximity to free margins a W-plasty was deemed most appropriate for repair.  Using a sterile surgical marker, the appropriate transposition arms of the W-plasty were drawn incorporating the defect and placing the expected incisions within the relaxed skin tension lines where possible.    The area thus outlined was incised deep to adipose tissue with a #15 scalpel blade.  The skin margins were undermined to an appropriate distance in all directions utilizing iris scissors.  The opposing transposition arms were then transposed into place in opposite direction and anchored with interrupted buried subcutaneous sutures.
Dermal Autograft Text: The defect edges were debeveled with a #15 scalpel blade.  Given the location of the defect, shape of the defect and the proximity to free margins a dermal autograft was deemed most appropriate.  Using a sterile surgical marker, the primary defect shape was transferred to the donor site. The area thus outlined was incised deep to adipose tissue with a #15 scalpel blade.  The harvested graft was then trimmed of adipose and epidermal tissue until only dermis was left.  The skin graft was then placed in the primary defect and oriented appropriately.
Complex Repair And Skin Substitute Graft Text: The defect edges were debeveled with a #15 scalpel blade.  The primary defect was closed partially with a complex linear closure.  Given the location of the remaining defect, shape of the defect and the proximity to free margins a skin substitute graft was deemed most appropriate to repair the remaining defect.  The graft was trimmed to fit the size of the remaining defect.  The graft was then placed in the primary defect, oriented appropriately, and sutured into place.
Double Island Pedicle Flap Text: The defect edges were debeveled with a #15 scalpel blade.  Given the location of the defect, shape of the defect and the proximity to free margins a double island pedicle advancement flap was deemed most appropriate.  Using a sterile surgical marker, an appropriate advancement flap was drawn incorporating the defect, outlining the appropriate donor tissue and placing the expected incisions within the relaxed skin tension lines where possible.    The area thus outlined was incised deep to adipose tissue with a #15 scalpel blade.  The skin margins were undermined to an appropriate distance in all directions around the primary defect and laterally outward around the island pedicle utilizing iris scissors.  There was minimal undermining beneath the pedicle flap.
Star Wedge Flap Text: The defect edges were debeveled with a #15 scalpel blade.  Given the location of the defect, shape of the defect and the proximity to free margins a star wedge flap was deemed most appropriate.  Using a sterile surgical marker, an appropriate rotation flap was drawn incorporating the defect and placing the expected incisions within the relaxed skin tension lines where possible. The area thus outlined was incised deep to adipose tissue with a #15 scalpel blade.  The skin margins were undermined to an appropriate distance in all directions utilizing iris scissors.
Advancement-Rotation Flap Text: The defect edges were debeveled with a #15 scalpel blade.  Given the location of the defect, shape of the defect and the proximity to free margins an advancement-rotation flap was deemed most appropriate.  Using a sterile surgical marker, an appropriate flap was drawn incorporating the defect and placing the expected incisions within the relaxed skin tension lines where possible. The area thus outlined was incised deep to adipose tissue with a #15 scalpel blade.  The skin margins were undermined to an appropriate distance in all directions utilizing iris scissors.
Transposition Flap Text: The defect edges were debeveled with a #15 scalpel blade.  Given the location of the defect and the proximity to free margins a transposition flap was deemed most appropriate.  Using a sterile surgical marker, an appropriate transposition flap was drawn incorporating the defect.    The area thus outlined was incised deep to adipose tissue with a #15 scalpel blade.  The skin margins were undermined to an appropriate distance in all directions utilizing iris scissors.
S Plasty Text: Given the location and shape of the defect, and the orientation of relaxed skin tension lines, an S-plasty was deemed most appropriate for repair.  Using a sterile surgical marker, the appropriate outline of the S-plasty was drawn, incorporating the defect and placing the expected incisions within the relaxed skin tension lines where possible.  The area thus outlined was incised deep to adipose tissue with a #15 scalpel blade.  The skin margins were undermined to an appropriate distance in all directions utilizing iris scissors. The skin flaps were advanced over the defect.  The opposing margins were then approximated with interrupted buried subcutaneous sutures.
Complex Repair And Bilobe Flap Text: The defect edges were debeveled with a #15 scalpel blade.  The primary defect was closed partially with a complex linear closure.  Given the location of the remaining defect, shape of the defect and the proximity to free margins a bilobe flap was deemed most appropriate for complete closure of the defect.  Using a sterile surgical marker, an appropriate advancement flap was drawn incorporating the defect and placing the expected incisions within the relaxed skin tension lines where possible.    The area thus outlined was incised deep to adipose tissue with a #15 scalpel blade.  The skin margins were undermined to an appropriate distance in all directions utilizing iris scissors.
Bilobed Flap Text: The defect edges were debeveled with a #15 scalpel blade.  Given the location of the defect and the proximity to free margins a bilobe flap was deemed most appropriate.  Using a sterile surgical marker, an appropriate bilobe flap drawn around the defect.    The area thus outlined was incised deep to adipose tissue with a #15 scalpel blade.  The skin margins were undermined to an appropriate distance in all directions utilizing iris scissors.
Bilobed Transposition Flap Text: The defect edges were debeveled with a #15 scalpel blade.  Given the location of the defect and the proximity to free margins a bilobed transposition flap was deemed most appropriate.  Using a sterile surgical marker, an appropriate bilobe flap drawn around the defect.    The area thus outlined was incised deep to adipose tissue with a #15 scalpel blade.  The skin margins were undermined to an appropriate distance in all directions utilizing iris scissors.
Split-Thickness Skin Graft Text: The defect edges were debeveled with a #15 scalpel blade.  Given the location of the defect, shape of the defect and the proximity to free margins a split thickness skin graft was deemed most appropriate.  Using a sterile surgical marker, the primary defect shape was transferred to the donor site. The split thickness graft was then harvested.  The skin graft was then placed in the primary defect and oriented appropriately.
Epidermal Closure Graft Donor Site (Optional): simple interrupted
Complex Repair And Transposition Flap Text: The defect edges were debeveled with a #15 scalpel blade.  The primary defect was closed partially with a complex linear closure.  Given the location of the remaining defect, shape of the defect and the proximity to free margins a transposition flap was deemed most appropriate for complete closure of the defect.  Using a sterile surgical marker, an appropriate advancement flap was drawn incorporating the defect and placing the expected incisions within the relaxed skin tension lines where possible.    The area thus outlined was incised deep to adipose tissue with a #15 scalpel blade.  The skin margins were undermined to an appropriate distance in all directions utilizing iris scissors.
Repair Performed By Another Provider Text (Leave Blank If You Do Not Want): After the tissue was excised the defect was repaired by another provider.
Purse String (Intermediate) Text: Given the location of the defect and the characteristics of the surrounding skin a pursestring intermediate closure was deemed most appropriate.  Undermining was performed circumfirentially around the surgical defect.  A purstring suture was then placed and tightened.
Burow's Advancement Flap Text: The defect edges were debeveled with a #15 scalpel blade.  Given the location of the defect and the proximity to free margins a Burow's advancement flap was deemed most appropriate.  Using a sterile surgical marker, the appropriate advancement flap was drawn incorporating the defect and placing the expected incisions within the relaxed skin tension lines where possible.    The area thus outlined was incised deep to adipose tissue with a #15 scalpel blade.  The skin margins were undermined to an appropriate distance in all directions utilizing iris scissors.
Deep Sutures: 4-0 Vicryl
Complex Repair And Rhombic Flap Text: The defect edges were debeveled with a #15 scalpel blade.  The primary defect was closed partially with a complex linear closure.  Given the location of the remaining defect, shape of the defect and the proximity to free margins a rhombic flap was deemed most appropriate for complete closure of the defect.  Using a sterile surgical marker, an appropriate advancement flap was drawn incorporating the defect and placing the expected incisions within the relaxed skin tension lines where possible.    The area thus outlined was incised deep to adipose tissue with a #15 scalpel blade.  The skin margins were undermined to an appropriate distance in all directions utilizing iris scissors.
Suture Removal: 14 days
Home Suture Removal Text: Patient was provided a home suture removal kit and will remove their sutures at home.  If they have any questions or difficulties they will call the office.
A-T Advancement Flap Text: The defect edges were debeveled with a #15 scalpel blade.  Given the location of the defect, shape of the defect and the proximity to free margins an A-T advancement flap was deemed most appropriate.  Using a sterile surgical marker, an appropriate advancement flap was drawn incorporating the defect and placing the expected incisions within the relaxed skin tension lines where possible.    The area thus outlined was incised deep to adipose tissue with a #15 scalpel blade.  The skin margins were undermined to an appropriate distance in all directions utilizing iris scissors.
Posterior Auricular Interpolation Flap Text: A decision was made to reconstruct the defect utilizing an interpolation axial flap and a staged reconstruction.  A telfa template was made of the defect.  This telfa template was then used to outline the posterior auricular interpolation flap.  The donor area for the pedicle flap was then injected with anesthesia.  The flap was excised through the skin and subcutaneous tissue down to the layer of the underlying musculature.  The pedicle flap was carefully excised within this deep plane to maintain its blood supply.  The edges of the donor site were undermined.   The donor site was closed in a primary fashion.  The pedicle was then rotated into position and sutured.  Once the tube was sutured into place, adequate blood supply was confirmed with blanching and refill.  The pedicle was then wrapped with xeroform gauze and dressed appropriately with a telfa and gauze bandage to ensure continued blood supply and protect the attached pedicle.
Repair Type: Intermediate
Fusiform Excision Additional Text (Leave Blank If You Do Not Want): The margin was drawn around the clinically apparent lesion.  A fusiform shape was then drawn on the skin incorporating the lesion and margins.  Incisions were then made along these lines to the appropriate tissue plane and the lesion was extirpated.
Mucosal Advancement Flap Text: Given the location of the defect, shape of the defect and the proximity to free margins a mucosal advancement flap was deemed most appropriate. Incisions were made with a 15 blade scalpel in the appropriate fashion along the cutaneous vermillion border and the mucosal lip. The remaining actinically damaged mucosal tissue was excised.  The mucosal advancement flap was then elevated to the gingival sulcus with care taken to preserve the neurovascular structures and advanced into the primary defect. Care was taken to ensure that precise realignment of the vermillion border was achieved.
Perilesional Excision Additional Text (Leave Blank If You Do Not Want): The margin was drawn around the clinically apparent lesion. Incisions were then made along these lines to the appropriate tissue plane and the lesion was extirpated.
Interpolation Flap Text: A decision was made to reconstruct the defect utilizing an interpolation axial flap and a staged reconstruction.  A telfa template was made of the defect.  This telfa template was then used to outline the interpolation flap.  The donor area for the pedicle flap was then injected with anesthesia.  The flap was excised through the skin and subcutaneous tissue down to the layer of the underlying musculature.  The interpolation flap was carefully excised within this deep plane to maintain its blood supply.  The edges of the donor site were undermined.   The donor site was closed in a primary fashion.  The pedicle was then rotated into position and sutured.  Once the tube was sutured into place, adequate blood supply was confirmed with blanching and refill.  The pedicle was then wrapped with xeroform gauze and dressed appropriately with a telfa and gauze bandage to ensure continued blood supply and protect the attached pedicle.
Graft Donor Site Bandage (Optional-Leave Blank If You Don't Want In Note): Steri-strips and a pressure bandage were applied to the donor site.
Consent was obtained from the patient. The risks and benefits to therapy were discussed in detail. Specifically, the risks of infection, scarring, bleeding, prolonged wound healing, incomplete removal, allergy to anesthesia, nerve injury and recurrence were addressed. Prior to the procedure, the treatment site was clearly identified and confirmed by the patient. All components of Universal Protocol/PAUSE Rule completed.
Xenograft Text: The defect edges were debeveled with a #15 scalpel blade.  Given the location of the defect, shape of the defect and the proximity to free margins a xenograft was deemed most appropriate.  The graft was then trimmed to fit the size of the defect.  The graft was then placed in the primary defect and oriented appropriately.
Complex Repair And Z Plasty Text: The defect edges were debeveled with a #15 scalpel blade.  The primary defect was closed partially with a complex linear closure.  Given the location of the remaining defect, shape of the defect and the proximity to free margins a Z plasty was deemed most appropriate for complete closure of the defect.  Using a sterile surgical marker, an appropriate advancement flap was drawn incorporating the defect and placing the expected incisions within the relaxed skin tension lines where possible.    The area thus outlined was incised deep to adipose tissue with a #15 scalpel blade.  The skin margins were undermined to an appropriate distance in all directions utilizing iris scissors.
No Repair - Repaired With Adjacent Surgical Defect Text (Leave Blank If You Do Not Want): After the excision the defect was repaired concurrently with another surgical defect which was in close approximation.
Epidermal Sutures: 4-0 Prolene
Epidermal Closure: running
Cheek-To-Nose Interpolation Flap Text: A decision was made to reconstruct the defect utilizing an interpolation axial flap and a staged reconstruction.  A telfa template was made of the defect.  This telfa template was then used to outline the Cheek-To-Nose Interpolation flap.  The donor area for the pedicle flap was then injected with anesthesia.  The flap was excised through the skin and subcutaneous tissue down to the layer of the underlying musculature.  The interpolation flap was carefully excised within this deep plane to maintain its blood supply.  The edges of the donor site were undermined.   The donor site was closed in a primary fashion.  The pedicle was then rotated into position and sutured.  Once the tube was sutured into place, adequate blood supply was confirmed with blanching and refill.  The pedicle was then wrapped with xeroform gauze and dressed appropriately with a telfa and gauze bandage to ensure continued blood supply and protect the attached pedicle.
Excision Depth: adipose tissue
Complex Repair And Double Advancement Flap Text: The defect edges were debeveled with a #15 scalpel blade.  The primary defect was closed partially with a complex linear closure.  Given the location of the remaining defect, shape of the defect and the proximity to free margins a double advancement flap was deemed most appropriate for complete closure of the defect.  Using a sterile surgical marker, an appropriate advancement flap was drawn incorporating the defect and placing the expected incisions within the relaxed skin tension lines where possible.    The area thus outlined was incised deep to adipose tissue with a #15 scalpel blade.  The skin margins were undermined to an appropriate distance in all directions utilizing iris scissors.
Paramedian Forehead Flap Text: A decision was made to reconstruct the defect utilizing an interpolation axial flap and a staged reconstruction.  A telfa template was made of the defect.  This telfa template was then used to outline the paramedian forehead pedicle flap.  The donor area for the pedicle flap was then injected with anesthesia.  The flap was excised through the skin and subcutaneous tissue down to the layer of the underlying musculature.  The pedicle flap was carefully excised within this deep plane to maintain its blood supply.  The edges of the donor site were undermined.   The donor site was closed in a primary fashion.  The pedicle was then rotated into position and sutured.  Once the tube was sutured into place, adequate blood supply was confirmed with blanching and refill.  The pedicle was then wrapped with xeroform gauze and dressed appropriately with a telfa and gauze bandage to ensure continued blood supply and protect the attached pedicle.
Ear Star Wedge Flap Text: The defect edges were debeveled with a #15 blade scalpel.  Given the location of the defect and the proximity to free margins (helical rim) an ear star wedge flap was deemed most appropriate.  Using a sterile surgical marker, the appropriate flap was drawn incorporating the defect and placing the expected incisions between the helical rim and antihelix where possible.  The area thus outlined was incised through and through with a #15 scalpel blade.
Size Of Margin In Cm: 0.2
Excision Method: Elliptical
Muscle Hinge Flap Text: The defect edges were debeveled with a #15 scalpel blade.  Given the size, depth and location of the defect and the proximity to free margins a muscle hinge flap was deemed most appropriate.  Using a sterile surgical marker, an appropriate hinge flap was drawn incorporating the defect. The area thus outlined was incised with a #15 scalpel blade.  The skin margins were undermined to an appropriate distance in all directions utilizing iris scissors.
Complex Repair And Dermal Autograft Text: The defect edges were debeveled with a #15 scalpel blade.  The primary defect was closed partially with a complex linear closure.  Given the location of the defect, shape of the defect and the proximity to free margins an dermal autograft was deemed most appropriate to repair the remaining defect.  The graft was trimmed to fit the size of the remaining defect.  The graft was then placed in the primary defect, oriented appropriately, and sutured into place.
Complex Repair And Single Advancement Flap Text: The defect edges were debeveled with a #15 scalpel blade.  The primary defect was closed partially with a complex linear closure.  Given the location of the remaining defect, shape of the defect and the proximity to free margins a single advancement flap was deemed most appropriate for complete closure of the defect.  Using a sterile surgical marker, an appropriate advancement flap was drawn incorporating the defect and placing the expected incisions within the relaxed skin tension lines where possible.    The area thus outlined was incised deep to adipose tissue with a #15 scalpel blade.  The skin margins were undermined to an appropriate distance in all directions utilizing iris scissors.
Composite Graft Text: The defect edges were debeveled with a #15 scalpel blade.  Given the location of the defect, shape of the defect, the proximity to free margins and the fact the defect was full thickness a composite graft was deemed most appropriate.  The defect was outline and then transferred to the donor site.  A full thickness graft was then excised from the donor site. The graft was then placed in the primary defect, oriented appropriately and then sutured into place.  The secondary defect was then repaired using a primary closure.

## 2018-04-04 PROBLEM — J30.2 SEASONAL ALLERGIC RHINITIS: Status: ACTIVE | Noted: 2018-04-04

## 2018-04-04 PROBLEM — R42 DIZZINESS: Status: ACTIVE | Noted: 2018-04-04

## 2018-04-12 ENCOUNTER — APPOINTMENT (RX ONLY)
Dept: URBAN - METROPOLITAN AREA CLINIC 24 | Facility: CLINIC | Age: 73
Setting detail: DERMATOLOGY
End: 2018-04-12

## 2018-04-12 DIAGNOSIS — Z48.01 ENCOUNTER FOR CHANGE OR REMOVAL OF SURGICAL WOUND DRESSING: ICD-10-CM

## 2018-04-12 PROCEDURE — ? SUTURE REMOVAL (GLOBAL PERIOD)

## 2018-04-12 ASSESSMENT — LOCATION ZONE DERM: LOCATION ZONE: TRUNK

## 2018-04-12 ASSESSMENT — LOCATION DETAILED DESCRIPTION DERM: LOCATION DETAILED: LEFT LATERAL INFERIOR CHEST

## 2018-04-12 ASSESSMENT — LOCATION SIMPLE DESCRIPTION DERM: LOCATION SIMPLE: CHEST

## 2018-04-12 NOTE — PROCEDURE: SUTURE REMOVAL (GLOBAL PERIOD)
Detail Level: Simple
Add 72906 Cpt? (Important Note: In 2017 The Use Of 96111 Is Being Tracked By Cms To Determine Future Global Period Reimbursement For Global Periods): no

## 2018-06-19 PROBLEM — R09.89 GLOBUS SENSATION: Status: ACTIVE | Noted: 2018-06-19

## 2018-09-07 ENCOUNTER — HOSPITAL ENCOUNTER (OUTPATIENT)
Dept: GENERAL RADIOLOGY | Age: 73
Discharge: HOME | End: 2018-09-07
Payer: MEDICARE

## 2018-09-07 DIAGNOSIS — G89.29 HEEL PAIN, CHRONIC, RIGHT: ICD-10-CM

## 2018-09-07 DIAGNOSIS — M79.671 HEEL PAIN, CHRONIC, RIGHT: ICD-10-CM

## 2018-09-07 PROBLEM — K11.21 PAROTITIS, ACUTE: Status: ACTIVE | Noted: 2018-09-07

## 2018-09-07 PROCEDURE — 73620 X-RAY EXAM OF FOOT: CPT

## 2018-09-07 NOTE — PROGRESS NOTES
XR confirms heel spur, may need to be shaved off, I will refer to ortho if he is willing to go. Thanks.

## 2018-10-11 ENCOUNTER — APPOINTMENT (RX ONLY)
Dept: URBAN - METROPOLITAN AREA CLINIC 24 | Facility: CLINIC | Age: 73
Setting detail: DERMATOLOGY
End: 2018-10-11

## 2018-10-11 DIAGNOSIS — D18.0 HEMANGIOMA: ICD-10-CM

## 2018-10-11 DIAGNOSIS — L57.0 ACTINIC KERATOSIS: ICD-10-CM

## 2018-10-11 DIAGNOSIS — Z85.828 PERSONAL HISTORY OF OTHER MALIGNANT NEOPLASM OF SKIN: ICD-10-CM

## 2018-10-11 DIAGNOSIS — D485 NEOPLASM OF UNCERTAIN BEHAVIOR OF SKIN: ICD-10-CM

## 2018-10-11 DIAGNOSIS — L82.1 OTHER SEBORRHEIC KERATOSIS: ICD-10-CM

## 2018-10-11 PROBLEM — J30.1 ALLERGIC RHINITIS DUE TO POLLEN: Status: ACTIVE | Noted: 2018-10-11

## 2018-10-11 PROBLEM — D18.01 HEMANGIOMA OF SKIN AND SUBCUTANEOUS TISSUE: Status: ACTIVE | Noted: 2018-10-11

## 2018-10-11 PROBLEM — H91.90 UNSPECIFIED HEARING LOSS, UNSPECIFIED EAR: Status: ACTIVE | Noted: 2018-10-11

## 2018-10-11 PROBLEM — D48.5 NEOPLASM OF UNCERTAIN BEHAVIOR OF SKIN: Status: ACTIVE | Noted: 2018-10-11

## 2018-10-11 PROCEDURE — 17000 DESTRUCT PREMALG LESION: CPT

## 2018-10-11 PROCEDURE — 11100: CPT | Mod: 59

## 2018-10-11 PROCEDURE — ? BIOPSY BY SHAVE METHOD

## 2018-10-11 PROCEDURE — 17003 DESTRUCT PREMALG LES 2-14: CPT

## 2018-10-11 PROCEDURE — ? COUNSELING

## 2018-10-11 PROCEDURE — ? LIQUID NITROGEN

## 2018-10-11 PROCEDURE — 99213 OFFICE O/P EST LOW 20 MIN: CPT | Mod: 25

## 2018-10-11 ASSESSMENT — LOCATION DETAILED DESCRIPTION DERM
LOCATION DETAILED: RIGHT CENTRAL ZYGOMA
LOCATION DETAILED: RIGHT LATERAL FOREHEAD
LOCATION DETAILED: RIGHT ANTERIOR PROXIMAL UPPER ARM
LOCATION DETAILED: RIGHT FOREHEAD
LOCATION DETAILED: RIGHT SUPERIOR UPPER BACK
LOCATION DETAILED: PERIUMBILICAL SKIN
LOCATION DETAILED: SUPERIOR MID FOREHEAD
LOCATION DETAILED: RIGHT DISTAL DORSAL FOREARM
LOCATION DETAILED: LEFT SUPERIOR FOREHEAD
LOCATION DETAILED: LEFT CENTRAL ZYGOMA
LOCATION DETAILED: LEFT DISTAL DORSAL FOREARM
LOCATION DETAILED: LEFT ULNAR DORSAL HAND
LOCATION DETAILED: LEFT RIB CAGE
LOCATION DETAILED: LEFT LATERAL INFERIOR CHEST

## 2018-10-11 ASSESSMENT — LOCATION ZONE DERM
LOCATION ZONE: HAND
LOCATION ZONE: ARM
LOCATION ZONE: TRUNK
LOCATION ZONE: FACE

## 2018-10-11 ASSESSMENT — LOCATION SIMPLE DESCRIPTION DERM
LOCATION SIMPLE: LEFT HAND
LOCATION SIMPLE: SUPERIOR FOREHEAD
LOCATION SIMPLE: LEFT FOREHEAD
LOCATION SIMPLE: ABDOMEN
LOCATION SIMPLE: RIGHT FOREHEAD
LOCATION SIMPLE: RIGHT UPPER ARM
LOCATION SIMPLE: LEFT FOREARM
LOCATION SIMPLE: RIGHT FOREARM
LOCATION SIMPLE: LEFT ZYGOMA
LOCATION SIMPLE: RIGHT UPPER BACK
LOCATION SIMPLE: RIGHT ZYGOMA
LOCATION SIMPLE: CHEST

## 2018-10-11 NOTE — PROCEDURE: BIOPSY BY SHAVE METHOD
Billing Type: Third-Party Bill
Silver Nitrate Text: The wound bed was treated with silver nitrate after the biopsy was performed.
Electrodesiccation And Curettage Text: The wound bed was treated with electrodesiccation and curettage after the biopsy was performed.
Destruction After The Procedure: No
Hemostasis: Aluminum Chloride
Biopsy Type: H and E
X Size Of Lesion In Cm: 0
Was A Bandage Applied: Yes
Post-care instructions were reviewed in detail and written instructions are provided. Patient is to keep the biopsy site dry overnight, and then apply bacitracin twice daily until healed. Patient may apply hydrogen peroxide soaks to remove any crusting.
Cryotherapy Text: The wound bed was treated with cryotherapy after the biopsy was performed.
Biopsy Method: Dermablade
Detail Level: Detailed
Anesthesia Type: 1% lidocaine with epinephrine
Notification Instructions: Patient will be notified of biopsy results. However, patient instructed to call the office if not contacted within 2 weeks.
Accession #: [id number]
Electrodesiccation Text: The wound bed was treated with electrodesiccation after the biopsy was performed.
Depth Of Biopsy: dermis
Dressing: bandage
Type Of Destruction Used: Curettage
Wound Care: Vaseline
Consent: Written consent was obtained and risks were reviewed including but not limited to scarring, infection, bleeding, scabbing, incomplete removal, and allergy to anesthesia.
Anesthesia Volume In Cc: 0.3

## 2018-10-25 PROBLEM — R68.89 FLU-LIKE SYMPTOMS: Status: ACTIVE | Noted: 2018-10-25

## 2018-10-25 PROBLEM — J18.9 PNEUMONIA OF BOTH LOWER LOBES DUE TO INFECTIOUS ORGANISM: Status: ACTIVE | Noted: 2018-10-25

## 2018-10-29 PROBLEM — J18.9 PNEUMONIA OF BOTH LOWER LOBES DUE TO INFECTIOUS ORGANISM: Status: RESOLVED | Noted: 2018-10-25 | Resolved: 2018-10-29

## 2018-10-29 PROBLEM — H69.83 DYSFUNCTION OF BOTH EUSTACHIAN TUBES: Status: ACTIVE | Noted: 2018-10-29

## 2018-10-29 PROBLEM — H69.93 DISORDER OF BOTH EUSTACHIAN TUBES: Status: ACTIVE | Noted: 2018-10-29

## 2018-10-29 PROBLEM — J30.9 ALLERGIC RHINITIS: Status: ACTIVE | Noted: 2018-10-29

## 2018-12-12 PROBLEM — R68.89 FLU-LIKE SYMPTOMS: Status: RESOLVED | Noted: 2018-10-25 | Resolved: 2018-12-12

## 2018-12-12 PROBLEM — H43.399 FLOATERS: Status: RESOLVED | Noted: 2017-06-13 | Resolved: 2018-12-12

## 2018-12-19 ENCOUNTER — HOSPITAL ENCOUNTER (OUTPATIENT)
Age: 73
Setting detail: OBSERVATION
Discharge: HOME | End: 2018-12-20
Attending: EMERGENCY MEDICINE | Admitting: INTERNAL MEDICINE
Payer: MEDICARE

## 2018-12-19 ENCOUNTER — APPOINTMENT (OUTPATIENT)
Dept: GENERAL RADIOLOGY | Age: 73
End: 2018-12-19
Attending: EMERGENCY MEDICINE
Payer: MEDICARE

## 2018-12-19 DIAGNOSIS — I48.0 PAROXYSMAL ATRIAL FIBRILLATION (HCC): Primary | ICD-10-CM

## 2018-12-19 PROBLEM — I48.91 ATRIAL FIBRILLATION WITH RVR (HCC): Status: ACTIVE | Noted: 2018-12-19

## 2018-12-19 LAB
ALBUMIN SERPL-MCNC: 3.8 G/DL (ref 3.2–4.6)
ALBUMIN/GLOB SERPL: 1.2 {RATIO} (ref 1.2–3.5)
ALP SERPL-CCNC: 88 U/L (ref 50–136)
ALT SERPL-CCNC: 29 U/L (ref 12–65)
ANION GAP SERPL CALC-SCNC: 9 MMOL/L (ref 7–16)
AST SERPL-CCNC: 20 U/L (ref 15–37)
BASOPHILS # BLD: 0 K/UL (ref 0–0.2)
BASOPHILS NFR BLD: 1 % (ref 0–2)
BILIRUB SERPL-MCNC: 0.5 MG/DL (ref 0.2–1.1)
BUN SERPL-MCNC: 16 MG/DL (ref 8–23)
CALCIUM SERPL-MCNC: 8.7 MG/DL (ref 8.3–10.4)
CHLORIDE SERPL-SCNC: 108 MMOL/L (ref 98–107)
CO2 SERPL-SCNC: 27 MMOL/L (ref 21–32)
CREAT SERPL-MCNC: 1.35 MG/DL (ref 0.8–1.5)
D DIMER PPP FEU-MCNC: 0.33 UG/ML(FEU)
DIFFERENTIAL METHOD BLD: NORMAL
EOSINOPHIL # BLD: 0.3 K/UL (ref 0–0.8)
EOSINOPHIL NFR BLD: 5 % (ref 0.5–7.8)
ERYTHROCYTE [DISTWIDTH] IN BLOOD BY AUTOMATED COUNT: 14 % (ref 11.9–14.6)
GLOBULIN SER CALC-MCNC: 3.2 G/DL (ref 2.3–3.5)
GLUCOSE SERPL-MCNC: 93 MG/DL (ref 65–100)
HCT VFR BLD AUTO: 44.5 % (ref 41.1–50.3)
HGB BLD-MCNC: 14.5 G/DL (ref 13.6–17.2)
IMM GRANULOCYTES # BLD: 0 K/UL (ref 0–0.5)
IMM GRANULOCYTES NFR BLD AUTO: 0 % (ref 0–5)
INR PPP: 1.2
LYMPHOCYTES # BLD: 1.3 K/UL (ref 0.5–4.6)
LYMPHOCYTES NFR BLD: 24 % (ref 13–44)
MCH RBC QN AUTO: 29.8 PG (ref 26.1–32.9)
MCHC RBC AUTO-ENTMCNC: 32.6 G/DL (ref 31.4–35)
MCV RBC AUTO: 91.6 FL (ref 79.6–97.8)
MONOCYTES # BLD: 0.6 K/UL (ref 0.1–1.3)
MONOCYTES NFR BLD: 10 % (ref 4–12)
NEUTS SEG # BLD: 3.4 K/UL (ref 1.7–8.2)
NEUTS SEG NFR BLD: 61 % (ref 43–78)
NRBC # BLD: 0 K/UL (ref 0–0.2)
PLATELET # BLD AUTO: 160 K/UL (ref 150–450)
PMV BLD AUTO: 11.1 FL (ref 9.4–12.3)
POTASSIUM SERPL-SCNC: 3.6 MMOL/L (ref 3.5–5.1)
PROT SERPL-MCNC: 7 G/DL (ref 6.3–8.2)
PROTHROMBIN TIME: 14.7 SEC (ref 11.7–14.5)
RBC # BLD AUTO: 4.86 M/UL (ref 4.23–5.6)
SODIUM SERPL-SCNC: 144 MMOL/L (ref 136–145)
TROPONIN I SERPL-MCNC: <0.02 NG/ML (ref 0.02–0.05)
TROPONIN I SERPL-MCNC: <0.02 NG/ML (ref 0.02–0.05)
TSH SERPL DL<=0.005 MIU/L-ACNC: 1.18 UIU/ML (ref 0.36–3.74)
WBC # BLD AUTO: 5.6 K/UL (ref 4.3–11.1)

## 2018-12-19 PROCEDURE — 71045 X-RAY EXAM CHEST 1 VIEW: CPT

## 2018-12-19 PROCEDURE — 85025 COMPLETE CBC W/AUTO DIFF WBC: CPT

## 2018-12-19 PROCEDURE — 74011000250 HC RX REV CODE- 250: Performed by: NURSE PRACTITIONER

## 2018-12-19 PROCEDURE — 96366 THER/PROPH/DIAG IV INF ADDON: CPT

## 2018-12-19 PROCEDURE — 85379 FIBRIN DEGRADATION QUANT: CPT

## 2018-12-19 PROCEDURE — 84443 ASSAY THYROID STIM HORMONE: CPT

## 2018-12-19 PROCEDURE — 99218 HC RM OBSERVATION: CPT

## 2018-12-19 PROCEDURE — 74011250636 HC RX REV CODE- 250/636: Performed by: NURSE PRACTITIONER

## 2018-12-19 PROCEDURE — 93005 ELECTROCARDIOGRAM TRACING: CPT | Performed by: EMERGENCY MEDICINE

## 2018-12-19 PROCEDURE — 93005 ELECTROCARDIOGRAM TRACING: CPT | Performed by: INTERNAL MEDICINE

## 2018-12-19 PROCEDURE — 96375 TX/PRO/DX INJ NEW DRUG ADDON: CPT

## 2018-12-19 PROCEDURE — 96368 THER/DIAG CONCURRENT INF: CPT

## 2018-12-19 PROCEDURE — 74011000258 HC RX REV CODE- 258: Performed by: NURSE PRACTITIONER

## 2018-12-19 PROCEDURE — 84484 ASSAY OF TROPONIN QUANT: CPT

## 2018-12-19 PROCEDURE — 36415 COLL VENOUS BLD VENIPUNCTURE: CPT

## 2018-12-19 PROCEDURE — 96365 THER/PROPH/DIAG IV INF INIT: CPT | Performed by: EMERGENCY MEDICINE

## 2018-12-19 PROCEDURE — 80053 COMPREHEN METABOLIC PANEL: CPT

## 2018-12-19 PROCEDURE — 85610 PROTHROMBIN TIME: CPT

## 2018-12-19 PROCEDURE — 99285 EMERGENCY DEPT VISIT HI MDM: CPT | Performed by: EMERGENCY MEDICINE

## 2018-12-19 RX ORDER — MORPHINE SULFATE 2 MG/ML
2 INJECTION, SOLUTION INTRAMUSCULAR; INTRAVENOUS
Status: DISCONTINUED | OUTPATIENT
Start: 2018-12-19 | End: 2018-12-20 | Stop reason: HOSPADM

## 2018-12-19 RX ORDER — ROSUVASTATIN CALCIUM 5 MG/1
10 TABLET, COATED ORAL DAILY
Status: DISCONTINUED | OUTPATIENT
Start: 2018-12-20 | End: 2018-12-20 | Stop reason: HOSPADM

## 2018-12-19 RX ORDER — HEPARIN SODIUM 5000 [USP'U]/100ML
12-25 INJECTION, SOLUTION INTRAVENOUS
Status: DISCONTINUED | OUTPATIENT
Start: 2018-12-19 | End: 2018-12-20

## 2018-12-19 RX ORDER — CIPROFLOXACIN 0.5 MG/.25ML
0.25 SOLUTION/ DROPS AURICULAR (OTIC) DAILY
Status: DISCONTINUED | OUTPATIENT
Start: 2018-12-20 | End: 2018-12-20 | Stop reason: HOSPADM

## 2018-12-19 RX ORDER — PANTOPRAZOLE SODIUM 40 MG/1
40 TABLET, DELAYED RELEASE ORAL
Status: DISCONTINUED | OUTPATIENT
Start: 2018-12-20 | End: 2018-12-20 | Stop reason: HOSPADM

## 2018-12-19 RX ORDER — OFLOXACIN 3 MG/ML
5 SOLUTION AURICULAR (OTIC) DAILY
Status: DISCONTINUED | OUTPATIENT
Start: 2018-12-20 | End: 2018-12-19 | Stop reason: SDUPTHER

## 2018-12-19 RX ORDER — SODIUM CHLORIDE 0.9 % (FLUSH) 0.9 %
5-10 SYRINGE (ML) INJECTION EVERY 8 HOURS
Status: DISCONTINUED | OUTPATIENT
Start: 2018-12-19 | End: 2018-12-20 | Stop reason: HOSPADM

## 2018-12-19 RX ORDER — ASPIRIN 81 MG/1
81 TABLET ORAL DAILY
Status: DISCONTINUED | OUTPATIENT
Start: 2018-12-20 | End: 2018-12-20 | Stop reason: HOSPADM

## 2018-12-19 RX ORDER — SODIUM CHLORIDE 0.9 % (FLUSH) 0.9 %
5-10 SYRINGE (ML) INJECTION AS NEEDED
Status: DISCONTINUED | OUTPATIENT
Start: 2018-12-19 | End: 2018-12-20 | Stop reason: HOSPADM

## 2018-12-19 RX ORDER — HEPARIN SODIUM 5000 [USP'U]/ML
4000 INJECTION, SOLUTION INTRAVENOUS; SUBCUTANEOUS ONCE
Status: COMPLETED | OUTPATIENT
Start: 2018-12-19 | End: 2018-12-19

## 2018-12-19 RX ADMIN — Medication 10 ML: at 22:00

## 2018-12-19 RX ADMIN — HEPARIN SODIUM AND DEXTROSE 12 UNITS/KG/HR: 5000; 5 INJECTION INTRAVENOUS at 20:42

## 2018-12-19 RX ADMIN — HEPARIN SODIUM 4000 UNITS: 5000 INJECTION INTRAVENOUS; SUBCUTANEOUS at 20:42

## 2018-12-19 RX ADMIN — DILTIAZEM HYDROCHLORIDE 10 MG/HR: 5 INJECTION INTRAVENOUS at 19:50

## 2018-12-19 NOTE — H&P
[facility]iology H&P    Admitting Cardiologist:    Primary Cardiologist:    Primary Care Physician:    Subjective:     Patricia García is a 68 y.o. male with hx of HLP, no prior cardiac problems, DM type 2 diet controlled, no prior CVA. He presents to ER today with new onset tachycardia after initially presenting to PCP with general malaise and weakness. He felt heart racing yesterday with no associated chest pain, nausea, vomiting or diaphoresis. He had more tachycardia today. EMS was summoned and he presented in AFib with RVR. He was given IV cardizem 20 mg in field with spontaneous conversion to NSR but with brief return of afib. Upon my evaluation he was noted to be in NSR. Denies prior bleeding or clotting problems. Review of ECGs in system noted NSR prior year and 3 yrs ago. No prior cardiac evaluations. Does endorse mild nasal bleeding yesterday with sneezing. Past Medical History:   Diagnosis Date    Allergic rhinitis     takes allegra    Arrhythmia     pt states \"heart will skip a beat\"    Controlled type 2 diabetes mellitus without complication, without long-term current use of insulin ([facility] [address] 3/2/2017    GERD (gastroesophageal reflux disease)     Hearing loss of both ears     pt states left ear worse than right    Heel pain, chronic, right 9/7/2018    Hypercholesterolemia     managed w/med    Obesity (BMI 30.0-34.9) 9/19/2017    Perforation of left tympanic membrane     Sleep apnea     pt states not able to tolerate cpap    Vertigo, benign paroxysmal       Past Surgical History:   Procedure Laterality Date    HX COLONOSCOPY      Clear, 10 year repeat 2025    HX HEENT      L ear tympanoplasty - Tyesha    HX ORTHOPAEDIC Left 2005    thumb tendon repair      No current facility-administered medications for this encounter. Current Outpatient Medications   Medication Sig    rosuvastatin (CRESTOR) 10 mg tablet Take 1 Tab by mouth daily.     loratadine (CLARITIN) 10 mg tablet Take 1 Tab by mouth daily.  fluticasone (FLONASE) 50 mcg/actuation nasal spray 2 Sprays by Both Nostrils route daily.  esomeprazole (NEXIUM) 40 mg capsule Take 1 Cap by mouth daily.  ofloxacin (FLOXIN) 0.3 % otic solution Administer 5 Drops in right ear daily.  VITAMIN B COMPLEX (VITAMIN B-50 PO) Take 1 Tab by mouth daily.  cholecalciferol (VITAMIN D3) 1,000 unit tablet Take 1,000 Units by mouth daily.  flaxseed oil 1,000 mg cap Take 1 Cap by mouth daily.  DOCOSAHEXANOIC ACID/EPA (FISH OIL PO) Take 1 Cap by mouth daily.  aspirin delayed-release 81 mg tablet Take 81 mg by mouth daily. Pt states stopped aspirin per surgeon's request.     Allergies   Allergen Reactions    Amoxicillin Rash      Social History     Tobacco Use    Smoking status: Never Smoker    Smokeless tobacco: Never Used   Substance Use Topics    Alcohol use: No      Family History   Problem Relation Age of Onset    Alzheimer Mother     Stroke Father     Diabetes Brother         Review of Systems  Gen: Denies fever, chills, malaise or fatigue. Appetite good. HEENT: recent nasal congestion, sneezing   Lungs: Denies shortness of breath, hx of COPD, breathing problems  Cardiovascular: as above  GI: Denies hememesis, dark tarry stools, No prior Hx of GI bleed, Denies constipation  : Denies dysuria, no complaints of frequency, nocturia  Heme: No prior bleeding disorders, no prior Cancer  Neuro: Denies prior CVA, TIA. Endocrine: diet controlled DM, thyroid disorders  Psychiatric: Denies anxiety, or other psychiatric illnesses. Objective:     Visit Vitals  BP (!) 158/91   Pulse (!) 135   Temp 98.3 °F (36.8 °C)   Resp 19   Ht 5' 10\" (1.778 m)   Wt 99.8 kg (220 lb)   SpO2 (!) 89%   BMI 31.57 kg/m²     General:Alert, cooperative, no distress, appears stated age  Head: Normocephalic, without obvious abnormality, atraumatic. Eyes: Conjunctivae/corneas clear. PERRL, EOMs intact  Nose:Nares normal. Septum midline.  Mucosa normal. No drainage or sinus tenderness. Throat: Lips, mucosa, and tongue normal. Teeth and gums normal.   Neck: Supple, symmetrical, trachea midline,  no carotid bruit and no JVD. Lungs:Clear to auscultation bilaterally. Chest wall: No tenderness or deformity. Heart: Regular rate and rhythm, S1, S2 normal, no murmur, click, rub or gallop. Abdomen:Soft, non-tender. Bowel sounds normal. No masses, No organomegaly. Extremities: Extremities normal, atraumatic, no cyanosis or edema. Pulses: 2+ and symmetric all extremities. Skin: Skin color, texture, turgor normal. No rashes or lesions  Lymph nodes: Cervical, supraclavicular, and axillary nodes normal  Neurologic:No focal deficits identified                 ECG: Initial Afib w RVR. Repeat NSR with no acute st/ t wave changes. Data Review:     Recent Results (from the past 24 hour(s))   CBC WITH AUTOMATED DIFF    Collection Time: 12/19/18  4:35 PM   Result Value Ref Range    WBC 5.6 4.3 - 11.1 K/uL    RBC 4.86 4.23 - 5.6 M/uL    HGB 14.5 13.6 - 17.2 g/dL    HCT 44.5 41.1 - 50.3 %    MCV 91.6 79.6 - 97.8 FL    MCH 29.8 26.1 - 32.9 PG    MCHC 32.6 31.4 - 35.0 g/dL    RDW 14.0 11.9 - 14.6 %    PLATELET 124 779 - 582 K/uL    MPV 11.1 9.4 - 12.3 FL    ABSOLUTE NRBC 0.00 0.0 - 0.2 K/uL    DF AUTOMATED      NEUTROPHILS 61 43 - 78 %    LYMPHOCYTES 24 13 - 44 %    MONOCYTES 10 4.0 - 12.0 %    EOSINOPHILS 5 0.5 - 7.8 %    BASOPHILS 1 0.0 - 2.0 %    IMMATURE GRANULOCYTES 0 0.0 - 5.0 %    ABS. NEUTROPHILS 3.4 1.7 - 8.2 K/UL    ABS. LYMPHOCYTES 1.3 0.5 - 4.6 K/UL    ABS. MONOCYTES 0.6 0.1 - 1.3 K/UL    ABS. EOSINOPHILS 0.3 0.0 - 0.8 K/UL    ABS. BASOPHILS 0.0 0.0 - 0.2 K/UL    ABS. IMM.  GRANS. 0.0 0.0 - 0.5 K/UL   METABOLIC PANEL, COMPREHENSIVE    Collection Time: 12/19/18  4:35 PM   Result Value Ref Range    Sodium 144 136 - 145 mmol/L    Potassium 3.6 3.5 - 5.1 mmol/L    Chloride 108 (H) 98 - 107 mmol/L    CO2 27 21 - 32 mmol/L    Anion gap 9 7 - 16 mmol/L    Glucose 93 65 - 100 mg/dL    BUN 16 8 - 23 MG/DL    Creatinine 1.35 0.8 - 1.5 MG/DL    GFR est AA >60 >60 ml/min/1.73m2    GFR est non-AA 55 (L) >60 ml/min/1.73m2    Calcium 8.7 8.3 - 10.4 MG/DL    Bilirubin, total 0.5 0.2 - 1.1 MG/DL    ALT (SGPT) 29 12 - 65 U/L    AST (SGOT) 20 15 - 37 U/L    Alk. phosphatase 88 50 - 136 U/L    Protein, total 7.0 6.3 - 8.2 g/dL    Albumin 3.8 3.2 - 4.6 g/dL    Globulin 3.2 2.3 - 3.5 g/dL    A-G Ratio 1.2 1.2 - 3.5     TROPONIN I    Collection Time: 12/19/18  4:35 PM   Result Value Ref Range    Troponin-I, Qt. <0.02 (L) 0.02 - 0.05 NG/ML         Assessment / Plan     Principal Problem:    PAF (paroxysmal atrial fibrillation) ([facility] [address] (12/19/2018)--new onset with spontaneous conversion to NSR with IV cardizem. Previous normal TSH in 2017. Denies associated CP. Returned to Afib with RVR. Will start IV cardizem and IV heparin infusions. Recently took cold medications. Plan [place]/ CV in AM. TTE. Active Problems:    Mixed hyperlipidemia (12/9/2016)--on Crestor.      DM--diet controlled          Sharon Gama NP

## 2018-12-19 NOTE — ED TRIAGE NOTES
Pt to ed via ems/stretcher from PCP with new onset a.fib - pt reports that he went to his PCP today for feeling weak and under the weather - pt denies any n/v/d - pt denies any shob - pt reports productive cough with green sputum and nasal congestion - per ems 20mg CARDIZEM given  - 18g right ac - bgl 107 - pt alert and oriented x3

## 2018-12-20 VITALS
BODY MASS INDEX: 31.8 KG/M2 | DIASTOLIC BLOOD PRESSURE: 66 MMHG | RESPIRATION RATE: 17 BRPM | TEMPERATURE: 98.1 F | OXYGEN SATURATION: 95 % | HEART RATE: 66 BPM | WEIGHT: 222.1 LBS | SYSTOLIC BLOOD PRESSURE: 135 MMHG | HEIGHT: 70 IN

## 2018-12-20 LAB
ANION GAP SERPL CALC-SCNC: 10 MMOL/L (ref 7–16)
APTT PPP: 39.5 SEC (ref 24.7–39.8)
APTT PPP: 57.2 SEC (ref 24.7–39.8)
ATRIAL RATE: 357 BPM
ATRIAL RATE: 68 BPM
ATRIAL RATE: 69 BPM
ATRIAL RATE: 74 BPM
BUN SERPL-MCNC: 19 MG/DL (ref 8–23)
CALCIUM SERPL-MCNC: 8.3 MG/DL (ref 8.3–10.4)
CALCULATED P AXIS, ECG09: 0 DEGREES
CALCULATED P AXIS, ECG09: 38 DEGREES
CALCULATED P AXIS, ECG09: 39 DEGREES
CALCULATED P AXIS, ECG09: 41 DEGREES
CALCULATED R AXIS, ECG10: -15 DEGREES
CALCULATED R AXIS, ECG10: -17 DEGREES
CALCULATED R AXIS, ECG10: -22 DEGREES
CALCULATED R AXIS, ECG10: -25 DEGREES
CALCULATED T AXIS, ECG11: 21 DEGREES
CALCULATED T AXIS, ECG11: 41 DEGREES
CALCULATED T AXIS, ECG11: 47 DEGREES
CALCULATED T AXIS, ECG11: 52 DEGREES
CHLORIDE SERPL-SCNC: 109 MMOL/L (ref 98–107)
CHOLEST SERPL-MCNC: 138 MG/DL
CO2 SERPL-SCNC: 22 MMOL/L (ref 21–32)
CREAT SERPL-MCNC: 1.11 MG/DL (ref 0.8–1.5)
DIAGNOSIS, 93000: NORMAL
ERYTHROCYTE [DISTWIDTH] IN BLOOD BY AUTOMATED COUNT: 14.1 % (ref 11.9–14.6)
GLUCOSE SERPL-MCNC: 158 MG/DL (ref 65–100)
HCT VFR BLD AUTO: 41 % (ref 41.1–50.3)
HDLC SERPL-MCNC: 39 MG/DL (ref 40–60)
HDLC SERPL: 3.5 {RATIO}
HGB BLD-MCNC: 13.6 G/DL (ref 13.6–17.2)
LDLC SERPL CALC-MCNC: 83 MG/DL
LIPID PROFILE,FLP: ABNORMAL
MCH RBC QN AUTO: 30.1 PG (ref 26.1–32.9)
MCHC RBC AUTO-ENTMCNC: 33.2 G/DL (ref 31.4–35)
MCV RBC AUTO: 90.7 FL (ref 79.6–97.8)
NRBC # BLD: 0 K/UL (ref 0–0.2)
P-R INTERVAL, ECG05: 148 MS
P-R INTERVAL, ECG05: 152 MS
P-R INTERVAL, ECG05: 162 MS
PLATELET # BLD AUTO: 154 K/UL (ref 150–450)
PMV BLD AUTO: 11.3 FL (ref 9.4–12.3)
POTASSIUM SERPL-SCNC: 3.6 MMOL/L (ref 3.5–5.1)
Q-T INTERVAL, ECG07: 314 MS
Q-T INTERVAL, ECG07: 386 MS
Q-T INTERVAL, ECG07: 432 MS
Q-T INTERVAL, ECG07: 440 MS
QRS DURATION, ECG06: 110 MS
QRS DURATION, ECG06: 88 MS
QRS DURATION, ECG06: 96 MS
QRS DURATION, ECG06: 98 MS
QTC CALCULATION (BEZET), ECG08: 413 MS
QTC CALCULATION (BEZET), ECG08: 459 MS
QTC CALCULATION (BEZET), ECG08: 479 MS
QTC CALCULATION (BEZET), ECG08: 488 MS
RBC # BLD AUTO: 4.52 M/UL (ref 4.23–5.6)
SODIUM SERPL-SCNC: 141 MMOL/L (ref 136–145)
TRIGL SERPL-MCNC: 80 MG/DL (ref 35–150)
TROPONIN I SERPL-MCNC: <0.02 NG/ML (ref 0.02–0.05)
VENTRICULAR RATE, ECG03: 140 BPM
VENTRICULAR RATE, ECG03: 68 BPM
VENTRICULAR RATE, ECG03: 69 BPM
VENTRICULAR RATE, ECG03: 74 BPM
VLDLC SERPL CALC-MCNC: 16 MG/DL (ref 6–23)
WBC # BLD AUTO: 7.7 K/UL (ref 4.3–11.1)

## 2018-12-20 PROCEDURE — 74011250636 HC RX REV CODE- 250/636: Performed by: PHYSICIAN ASSISTANT

## 2018-12-20 PROCEDURE — 93306 TTE W/DOPPLER COMPLETE: CPT

## 2018-12-20 PROCEDURE — 36415 COLL VENOUS BLD VENIPUNCTURE: CPT

## 2018-12-20 PROCEDURE — 74011250637 HC RX REV CODE- 250/637: Performed by: NURSE PRACTITIONER

## 2018-12-20 PROCEDURE — 93005 ELECTROCARDIOGRAM TRACING: CPT | Performed by: INTERNAL MEDICINE

## 2018-12-20 PROCEDURE — 74011250637 HC RX REV CODE- 250/637: Performed by: PHYSICIAN ASSISTANT

## 2018-12-20 PROCEDURE — 85027 COMPLETE CBC AUTOMATED: CPT

## 2018-12-20 PROCEDURE — 80061 LIPID PANEL: CPT

## 2018-12-20 PROCEDURE — 99218 HC RM OBSERVATION: CPT

## 2018-12-20 PROCEDURE — 96366 THER/PROPH/DIAG IV INF ADDON: CPT

## 2018-12-20 PROCEDURE — 74011250636 HC RX REV CODE- 250/636: Performed by: INTERNAL MEDICINE

## 2018-12-20 PROCEDURE — 74011250637 HC RX REV CODE- 250/637: Performed by: INTERNAL MEDICINE

## 2018-12-20 PROCEDURE — 74011250636 HC RX REV CODE- 250/636: Performed by: NURSE PRACTITIONER

## 2018-12-20 PROCEDURE — 85730 THROMBOPLASTIN TIME PARTIAL: CPT

## 2018-12-20 PROCEDURE — 84484 ASSAY OF TROPONIN QUANT: CPT

## 2018-12-20 PROCEDURE — 96376 TX/PRO/DX INJ SAME DRUG ADON: CPT

## 2018-12-20 PROCEDURE — 80048 BASIC METABOLIC PNL TOTAL CA: CPT

## 2018-12-20 RX ORDER — FLECAINIDE ACETATE 100 MG/1
200 TABLET ORAL ONCE
Status: DISCONTINUED | OUTPATIENT
Start: 2018-12-20 | End: 2018-12-20

## 2018-12-20 RX ORDER — DILTIAZEM HYDROCHLORIDE 30 MG/1
30 TABLET, FILM COATED ORAL EVERY 6 HOURS
Status: DISCONTINUED | OUTPATIENT
Start: 2018-12-20 | End: 2018-12-20 | Stop reason: HOSPADM

## 2018-12-20 RX ORDER — MAG HYDROX/ALUMINUM HYD/SIMETH 200-200-20
30 SUSPENSION, ORAL (FINAL DOSE FORM) ORAL
Status: DISCONTINUED | OUTPATIENT
Start: 2018-12-20 | End: 2018-12-20 | Stop reason: HOSPADM

## 2018-12-20 RX ORDER — HEPARIN SODIUM 5000 [USP'U]/ML
35 INJECTION, SOLUTION INTRAVENOUS; SUBCUTANEOUS ONCE
Status: COMPLETED | OUTPATIENT
Start: 2018-12-20 | End: 2018-12-20

## 2018-12-20 RX ORDER — DILTIAZEM HYDROCHLORIDE 120 MG/1
120 CAPSULE, COATED, EXTENDED RELEASE ORAL DAILY
Qty: 30 CAP | Refills: 3 | Status: SHIPPED | OUTPATIENT
Start: 2018-12-20 | End: 2019-02-25 | Stop reason: SINTOL

## 2018-12-20 RX ORDER — FLECAINIDE ACETATE 100 MG/1
100 TABLET ORAL 2 TIMES DAILY
Qty: 60 TAB | Refills: 3 | Status: SHIPPED | OUTPATIENT
Start: 2018-12-21 | End: 2019-01-20

## 2018-12-20 RX ORDER — FLECAINIDE ACETATE 100 MG/1
200 TABLET ORAL ONCE
Status: COMPLETED | OUTPATIENT
Start: 2018-12-20 | End: 2018-12-20

## 2018-12-20 RX ADMIN — ROSUVASTATIN CALCIUM 10 MG: 5 TABLET, FILM COATED ORAL at 08:21

## 2018-12-20 RX ADMIN — ASPIRIN 81 MG: 81 TABLET, COATED ORAL at 08:21

## 2018-12-20 RX ADMIN — HEPARIN SODIUM 3500 UNITS: 5000 INJECTION INTRAVENOUS; SUBCUTANEOUS at 05:09

## 2018-12-20 RX ADMIN — HEPARIN SODIUM 3500 UNITS: 5000 INJECTION INTRAVENOUS; SUBCUTANEOUS at 13:14

## 2018-12-20 RX ADMIN — Medication 10 ML: at 13:17

## 2018-12-20 RX ADMIN — HEPARIN SODIUM AND DEXTROSE 18 UNITS/KG/HR: 5000; 5 INJECTION INTRAVENOUS at 13:14

## 2018-12-20 RX ADMIN — Medication 5 ML: at 05:09

## 2018-12-20 RX ADMIN — PANTOPRAZOLE SODIUM 40 MG: 40 TABLET, DELAYED RELEASE ORAL at 08:21

## 2018-12-20 RX ADMIN — DILTIAZEM HYDROCHLORIDE 30 MG: 30 TABLET, FILM COATED ORAL at 13:16

## 2018-12-20 RX ADMIN — APIXABAN 5 MG: 5 TABLET, FILM COATED ORAL at 14:14

## 2018-12-20 RX ADMIN — ALUMINUM HYDROXIDE, MAGNESIUM HYDROXIDE, AND SIMETHICONE 30 ML: 200; 200; 20 SUSPENSION ORAL at 13:19

## 2018-12-20 RX ADMIN — FLECAINIDE ACETATE 200 MG: 100 TABLET ORAL at 14:14

## 2018-12-20 NOTE — CONSULTS
[facility] Cardiology/Electrophyiology Consult                Date of  Admission: 12/19/2018  4:23 PM       CC/Reason for consult: atrial fibrillation    Theresa Raza is a 68 y.o. male admitted for PAF (paroxysmal atrial fibrillation) ([facility] [address] 68 y.o. male with hx of HLP, no prior cardiac problems, DM type 2 diet controlled, no prior CVA, who presents to ER today with new onset tachycardia after initially presenting to PCP with general malaise and weakness and was noted to be in atrial fibrillation with RVR and EP was consulted for management. He felt heart racing yesterday with no associated chest pain, nausea, vomiting or diaphoresis. He had more tachycardia today and EMS brought Pt to the ER in AFib with RVR. He was given IV cardizem 20 mg in field with spontaneous conversion to NSR but with brief return of afib. Upon my evaluation he was noted to be in NSR. Denies prior bleeding or clotting problems. Review of ECGs in system noted NSR prior year and 3 yrs ago. No prior cardiac evaluations. Does endorse mild nasal bleeding yesterday with sneezing. Pt atrial fibrillation is a new problem, no aggravating or alleviating factors, with symptoms as noted above.      Cardiac PMH: (Old records have been reviewed and summarized below)  TTE (12/20/18): Patient Active Problem List   Diagnosis Code    Mixed hyperlipidemia E78.2    Low vitamin D level R79.89    Perennial allergic rhinitis J30.89    Gastroesophageal reflux disease without esophagitis K21.9    Routine general medical examination at a health care facility Z00.00    Vertigo R42    History of tympanoplasty of left ear Z98.890    Controlled type 2 diabetes mellitus without complication, without long-term current use of insulin ([facility] [address] E11.9    Screening for alcoholism Z13.39    Heart palpitations R00.2    Encounter for immunization Z23    Obesity (BMI 30.0-34. 9) E66.9    Benign prostatic hyperplasia without lower urinary tract symptoms N40.0    Pain in lateral portion of left knee M25.562    Seasonal allergic rhinitis J30.2    Dizziness R42    Globus sensation F45.8    Heel pain, chronic, right M79.671, G89.29    Parotitis, acute K11.21    Dysfunction of both eustachian tubes H69.83    Allergic rhinitis J30.9    Disorder of both eustachian tubes H69.93    Atrial fibrillation with RVR ([facility]) I48.91    PAF (paroxysmal atrial fibrillation) ([facility]) I48.0       Past Medical History:   Diagnosis Date    Allergic rhinitis     takes allegra    Arrhythmia     pt states \"heart will skip a beat\"    Controlled type 2 diabetes mellitus without complication, without long-term current use of insulin ([facility]) 3/2/2017    GERD (gastroesophageal reflux disease)     Hearing loss of both ears     pt states left ear worse than right    Heel pain, chronic, right 9/7/2018    Hypercholesterolemia     managed w/med    Obesity (BMI 30.0-34.9) 9/19/2017    Perforation of left tympanic membrane     Sleep apnea     pt states not able to tolerate cpap    Vertigo, benign paroxysmal       Past Surgical History:   Procedure Laterality Date    HX COLONOSCOPY      Clear, 10 year repeat 2025    HX HEENT      L ear tympanoplasty - Tyesha    HX ORTHOPAEDIC Left 2005    thumb tendon repair     Allergies   Allergen Reactions    Amoxicillin Rash      Family History   Problem Relation Age of Onset    Alzheimer Mother     Stroke Father     Diabetes Brother         Current Facility-Administered Medications   Medication Dose Route Frequency    aspirin delayed-release tablet 81 mg  81 mg Oral DAILY    pantoprazole (PROTONIX) tablet 40 mg  40 mg Oral ACB    rosuvastatin (CRESTOR) tablet 10 mg  10 mg Oral DAILY    sodium chloride (NS) flush 5-10 mL  5-10 mL IntraVENous Q8H    sodium chloride (NS) flush 5-10 mL  5-10 mL IntraVENous PRN    morphine injection 2 mg  2 mg IntraVENous Q4H PRN    heparin 25,000 units in dextrose 500 mL infusion  12-25 Units/kg/hr IntraVENous TITRATE  ciprofloxacin (CETRAXAL) 0.2 % otic solution 0.25 mL  0.25 mL Right Ear DAILY    dilTIAZem (CARDIZEM) 125 mg in dextrose 5% 125 mL infusion  0-15 mg/hr IntraVENous TITRATE       Review of Symptoms:  A comprehensive ROS was performed with the pertinent positives and negatives mentioned in the HPI, all other systems reviewed and are negative       Physical Exam  Vitals:    12/20/18 0021 12/20/18 0049 12/20/18 0212 12/20/18 0447   BP:  102/80 93/63 122/64   Pulse: (!) 56 (!) 52 64 69   Resp:  17  17   Temp:  97.6 °F (36.4 °C)  98 °F (36.7 °C)   SpO2:  97%  97%   Weight:    100.7 kg (222 lb 1.6 oz)   Height:           Physical Exam:  General appearance - Alert, well appearing, and in no distress   Mental status - Affect appropriate to mood. Eyes - Sclerae anicteric,  ENMT - Hearing grossly normal bilaterally  Neck - Carotids upstroke normal bilaterally, no bruits, no JVD. Resp - Clear to auscultation, no wheezes, rales or rhonchi, symmetric air entry. Heart - irreg irreg, distant S1, S2, no murmurs, rubs, clicks or gallops. GI - Soft, nontender, nondistended  Neurological - Grossly intact - normal speech, no focal findings  Musculoskeletal - No joint tenderness, deformity or swelling, no muscular tenderness noted. Extremities - Peripheral pulses normal, no pedal edema, no clubbing or cyanosis. Skin - Normal coloration and turgor. Psych -  oriented to person, place, and time.        Cardiographics    Telemetry: atrial flutter with intermittent AV conduction, rapid response  ECG (Indpendently visualized and interpreted):  Echocardiogram: EF 50-55%    Labs:   Recent Labs     12/20/18  0409 12/19/18  1926 12/19/18  1635     --  144   K 3.6  --  3.6   BUN 19  --  16   CREA 1.11  --  1.35   *  --  93   WBC 7.7  --  5.6   HGB 13.6  --  14.5   HCT 41.0*  --  44.5     --  160   INR  --  1.2  --    TRIGL 80  --   --    HDL 39*  --   --         Assessment:      Principal Problem:    PAF (paroxysmal atrial fibrillation) ([facility] [address] (12/19/2018)    Active Problems:    Mixed hyperlipidemia (12/9/2016)    68 y.o. male with hx of HLP, no prior cardiac problems, DM type 2 diet controlled, no prior CVA, who presents to ER today with new onset tachycardia after initially presenting to PCP with general malaise and weakness and was noted to be in atrial fibrillation with RVR and EP was consulted for management. Plan:   1. Atrial fibrillation and atrial flutter, new diagnosis, uncontrolled: I had a long discussion with the Pt today regarding rate and rhythm control strategies, rhythm control strategy treatment options including DCCV, antiarrhythmic therapy, catheter ablation and the combination of the above. I discussed at length the advantages and disadvantages of all treatment strategies. Pt is going in and out of atrial flutter this AM, will plan for flecainide 200mg loading dose and if rhythm stabilizes and 2 hour post dose ECG unremarkable will plan for D/C with outpatient follow up. Will consider ablation as outpatient. --flecainide 200mg x 1 now, 100mg BID       --pt needs ECG in 1-2 weeks and EP follow up       --transition to long acting 120mg diltiazem     2. CVA protection: stop heparin, start eliquis 5mg Q12H    Thank you very much for this referral. We appreciate the opportunity to participate in this patient's care. We will follow along with above stated plan. Anayeli Starks MD, MS  Cardiology/Electrophysiology

## 2018-12-20 NOTE — ED PROVIDER NOTES
Patient is a 67 yo male who presents with fatigue and lethargy. States he has had a mild cough the last few days however today felt more tired than usual and felt some palpitations. He denies chest pain, denies any SOB, no nausea or vomiting, no fevers or chills.                Past Medical History:   Diagnosis Date    Allergic rhinitis     takes allegra    Arrhythmia     pt states \"heart will skip a beat\"    Controlled type 2 diabetes mellitus without complication, without long-term current use of insulin ([address] 3/2/2017    GERD (gastroesophageal reflux disease)     Hearing loss of both ears     pt states left ear worse than right    Heel pain, chronic, right 9/7/2018    Hypercholesterolemia     managed w/med    Obesity (BMI 30.0-34.9) 9/19/2017    Perforation of left tympanic membrane     Sleep apnea     pt states not able to tolerate cpap    Vertigo, benign paroxysmal        Past Surgical History:   Procedure Laterality Date    HX COLONOSCOPY      Clear, 10 year repeat 2025    HX HEENT      L ear tympanoplasty - Tyesha    HX ORTHOPAEDIC Left 2005    thumb tendon repair         Family History:   Problem Relation Age of Onset    Alzheimer Mother     Stroke Father     Diabetes Brother        Social History     Socioeconomic History    Marital status:      Spouse name: Not on file    Number of children: Not on file    Years of education: Not on file    Highest education level: Not on file   Social Needs    Financial resource strain: Not on file    Food insecurity - worry: Not on file    Food insecurity - inability: Not on file   [employer] needs - medical: Not on file   [employer] needs - non-medical: Not on file   Occupational History    Not on file   Tobacco Use    Smoking status: Never Smoker    Smokeless tobacco: Never Used   Substance and Sexual Activity    Alcohol use: No    Drug use: No    Sexual activity: Yes     Partners: Female     Birth control/protection: None   Other Topics Concern    Not on file   Social History Narrative    Not on file         ALLERGIES: Amoxicillin    Review of Systems   Constitutional: Positive for fatigue. Negative for chills and fever. HENT: Negative for rhinorrhea and sore throat. Eyes: Negative for visual disturbance. Respiratory: Positive for cough. Negative for shortness of breath. Cardiovascular: Positive for palpitations. Negative for chest pain and leg swelling. Gastrointestinal: Negative for abdominal pain, diarrhea, nausea and vomiting. Genitourinary: Negative for dysuria. Musculoskeletal: Negative for back pain and neck pain. Skin: Negative for rash. Neurological: Negative for weakness and headaches. Psychiatric/Behavioral: The patient is not nervous/anxious. Vitals:    12/19/18 1721 12/19/18 1741 12/19/18 1801 12/19/18 1821   BP: (!) 156/91 168/69 143/83 165/87   Pulse: (!) 154 (!) 153 73 70   Resp: 20 18 19 18   Temp:       SpO2: 94% 94% 91% 95%   Weight:       Height:                Physical Exam   Constitutional: He is oriented to person, place, and time. He appears well-developed and well-nourished. HENT:   Head: Normocephalic. Right Ear: External ear normal.   Left Ear: External ear normal.   Eyes: Conjunctivae and EOM are normal. Pupils are equal, round, and reactive to light. Neck: Normal range of motion. Neck supple. No tracheal deviation present. Cardiovascular: Normal rate, regular rhythm, normal heart sounds and intact distal pulses. No murmur heard. Initial exam with rapid irregular heart rate. Repeat exam only a few minutes later with regular heart rate and rhythm. Pulses 2+ in all extremities   Pulmonary/Chest: Effort normal and breath sounds normal. No stridor. No respiratory distress. He has no wheezes. Abdominal: Soft. He exhibits no distension. There is no tenderness. Musculoskeletal: Normal range of motion.    Neurological: He is alert and oriented to person, place, and time. No cranial nerve deficit. Skin: No rash noted. Nursing note and vitals reviewed. MDM  Number of Diagnoses or Management Options     Amount and/or Complexity of Data Reviewed  Clinical lab tests: reviewed and ordered  Tests in the radiology section of CPT®: ordered and reviewed  Tests in the medicine section of CPT®: ordered and reviewed  Review and summarize past medical records: yes    Risk of Complications, Morbidity, and/or Mortality  Presenting problems: high  Diagnostic procedures: high  Management options: high    Patient Progress  Patient progress: stable         Procedures  Recent Results (from the past 12 hour(s))   CBC WITH AUTOMATED DIFF    Collection Time: 12/19/18  4:35 PM   Result Value Ref Range    WBC 5.6 4.3 - 11.1 K/uL    RBC 4.86 4.23 - 5.6 M/uL    HGB 14.5 13.6 - 17.2 g/dL    HCT 44.5 41.1 - 50.3 %    MCV 91.6 79.6 - 97.8 FL    MCH 29.8 26.1 - 32.9 PG    MCHC 32.6 31.4 - 35.0 g/dL    RDW 14.0 11.9 - 14.6 %    PLATELET 864 855 - 944 K/uL    MPV 11.1 9.4 - 12.3 FL    ABSOLUTE NRBC 0.00 0.0 - 0.2 K/uL    DF AUTOMATED      NEUTROPHILS 61 43 - 78 %    LYMPHOCYTES 24 13 - 44 %    MONOCYTES 10 4.0 - 12.0 %    EOSINOPHILS 5 0.5 - 7.8 %    BASOPHILS 1 0.0 - 2.0 %    IMMATURE GRANULOCYTES 0 0.0 - 5.0 %    ABS. NEUTROPHILS 3.4 1.7 - 8.2 K/UL    ABS. LYMPHOCYTES 1.3 0.5 - 4.6 K/UL    ABS. MONOCYTES 0.6 0.1 - 1.3 K/UL    ABS. EOSINOPHILS 0.3 0.0 - 0.8 K/UL    ABS. BASOPHILS 0.0 0.0 - 0.2 K/UL    ABS. IMM.  GRANS. 0.0 0.0 - 0.5 K/UL   METABOLIC PANEL, COMPREHENSIVE    Collection Time: 12/19/18  4:35 PM   Result Value Ref Range    Sodium 144 136 - 145 mmol/L    Potassium 3.6 3.5 - 5.1 mmol/L    Chloride 108 (H) 98 - 107 mmol/L    CO2 27 21 - 32 mmol/L    Anion gap 9 7 - 16 mmol/L    Glucose 93 65 - 100 mg/dL    BUN 16 8 - 23 MG/DL    Creatinine 1.35 0.8 - 1.5 MG/DL    GFR est AA >60 >60 ml/min/1.73m2    GFR est non-AA 55 (L) >60 ml/min/1.73m2    Calcium 8.7 8.3 - 10.4 MG/DL    Bilirubin, total 0.5 0.2 - 1.1 MG/DL    ALT (SGPT) 29 12 - 65 U/L    AST (SGOT) 20 15 - 37 U/L    Alk. phosphatase 88 50 - 136 U/L    Protein, total 7.0 6.3 - 8.2 g/dL    Albumin 3.8 3.2 - 4.6 g/dL    Globulin 3.2 2.3 - 3.5 g/dL    A-G Ratio 1.2 1.2 - 3.5     TROPONIN I    Collection Time: 12/19/18  4:35 PM   Result Value Ref Range    Troponin-I, Qt. <0.02 (L) 0.02 - 0.05 NG/ML   TSH 3RD GENERATION    Collection Time: 12/19/18  4:35 PM   Result Value Ref Range    TSH 1.180 0.358 - 3.740 uIU/mL     Xr Chest Port    Result Date: 12/19/2018  Portable AP upright chest dated 12/19/2018, 1620 hours CLINICAL INFORMATION: Atrial fibrillation Heart is upper limits of normal in size and mediastinum unremarkable. Lungs clear and pulmonary vascularity normal. No pleural effusion. IMPRESSION: No acute abnormality    67 yo male with intermittent afib with rvr:    Discussed with Dr. Emery Cast for workup of new onset Afib. Will hold off on further dilt at this time (recieved 20 in route) as he has converted at this time.

## 2018-12-20 NOTE — PROGRESS NOTES
Monitor room notified primary RN of pt heart rate of 48. When entering patients room, pt appeared pale, restless and complains of \"not feeling good\". After looking at rhythm pt appears to have converted from A fib RVR in the 150s to Sinus Adriano Crea. 2 L of oxygen applied for comfort, BP cycles Q 15min. Cardizem titrated to 5 per Luanne NP order. Will continue to monitor.

## 2018-12-20 NOTE — PROGRESS NOTES
Bedside and Verbal shift change report given to ETHAN Castillo (oncoming nurse) by self (offgoing nurse). Report included the following information SBAR, Kardex, Intake/Output and MAR. Heparin and Cardizem drip verified at bedside.

## 2018-12-20 NOTE — PROGRESS NOTES
TRANSFER - IN REPORT:    Verbal report received from Tiago Limon [facility] on [employer] being received from ER for routine progression of care      Report consisted of patients Situation, Background, Assessment and Recommendations(SBAR). Information from the following report(s) SBAR, Kardex, Intake/Output and MAR was reviewed with the receiving nurse. Opportunity for questions and clarification was provided. Assessment completed upon patients arrival to unit and care assumed.

## 2018-12-20 NOTE — PROGRESS NOTES
Problem: Falls - Risk of  Goal: *Absence of Falls  Document Fernanda Fall Risk and appropriate interventions in the flowsheet.   Outcome: Progressing Towards Goal  Fall Risk Interventions:            Medication Interventions: Patient to call before getting OOB, Teach patient to arise slowly

## 2018-12-20 NOTE — PROGRESS NOTES
Discharge instructions, follow up appointments and prescriptions reviewed with patient and family. Both verbalize understanding. All personal belongings taken with patient. Family member will drive patient home. Patient escorted to discharge area via wheelchair. Patient is stable at discharge. IV and monitor removed.

## 2018-12-20 NOTE — ROUTINE PROCESS
TRANSFER - OUT REPORT:    Verbal report given to mojgan gonzales rn(name) on Saurabh Martin  being transferred to [facility](unit) for routine progression of care       Report consisted of patients Situation, Background, Assessment and   Recommendations(SBAR). Information from the following report(s) SBAR was reviewed with the receiving nurse. Lines:   Peripheral IV 12/19/18 Right Antecubital (Active)   Site Assessment Clean, dry, & intact 12/19/2018  4:29 PM   Phlebitis Assessment 0 12/19/2018  4:29 PM   Infiltration Assessment 0 12/19/2018  4:29 PM   Dressing Status Clean, dry, & intact 12/19/2018  4:29 PM       Peripheral IV 12/19/18 Left Antecubital (Active)   Site Assessment Clean, dry, & intact 12/19/2018  7:58 PM   Phlebitis Assessment 0 12/19/2018  7:58 PM   Infiltration Assessment 0 12/19/2018  7:58 PM   Dressing Status Clean, dry, & intact 12/19/2018  7:58 PM   Dressing Type Transparent 12/19/2018  7:58 PM   Hub Color/Line Status Green 12/19/2018  7:58 PM        Opportunity for questions and clarification was provided.       Patient transported with:   Registered Nurse  Tech

## 2018-12-20 NOTE — PROGRESS NOTES
Pt admitted to 3rd floor tele for PAF. CM met with pt to discuss CM needs & DCP. Pt is A&Ox4. Pt is indep at home with all ADLS. Pt lives with spouse. Pt has no DME needs. Pt has no difficulty with obtaining medications or transport. DCP home with spouse. No further needs noted. CM to continue to monitor. Care Management Interventions  PCP Verified by CM: Yes  Last Visit to PCP: 12/19/18  Mode of Transport at Discharge:  Other (see comment)(Spouse Kamala Padilla [phone])  Transition of Care Consult (CM Consult): Discharge Planning  Discharge Durable Medical Equipment: No  Physical Therapy Consult: No  Occupational Therapy Consult: No  Current Support Network: Lives with Spouse, Own Home  Confirm Follow Up Transport: Family  Plan discussed with Pt/Family/Caregiver: Yes  Freedom of Choice Offered: Yes  Discharge Location  Discharge Placement: Home

## 2018-12-20 NOTE — PROGRESS NOTES
Bedside and Verbal shift change report given to self (oncoming nurse) by Leela Cast RN (offgoing nurse). Report included the following information SBAR, Kardex, MAR and Recent Results.

## 2018-12-20 NOTE — DISCHARGE INSTRUCTIONS

## 2018-12-20 NOTE — DISCHARGE SUMMARY
[facility] Cardiology Discharge Summary     Patient ID:  Bethanie García  [id number]  57 y.o.  1945    Admit date: 12/19/2018    Discharge date and time:  12-    Admitting Physician: Duke Echeverria MD     Discharge Physician: Sonido Biggs PA-C/Dr. Lovena Cushing    Admission Diagnoses: PAF (paroxysmal atrial fibrillation) [facility])    Discharge Diagnoses:   Patient Active Problem List    Diagnosis Date Noted    Atrial fibrillation with RVR ([facility] 12/19/2018    PAF (paroxysmal atrial fibrillation) ([facility] 12/19/2018    Dysfunction of both eustachian tubes 10/29/2018    Allergic rhinitis 10/29/2018    Disorder of both eustachian tubes 10/29/2018    Heel pain, chronic, right 09/07/2018    Parotitis, acute 09/07/2018    Globus sensation 06/19/2018    Seasonal allergic rhinitis 04/04/2018    Dizziness 04/04/2018    Pain in lateral portion of left knee 03/19/2018    Benign prostatic hyperplasia without lower urinary tract symptoms 12/19/2017    Encounter for immunization 09/19/2017    Obesity (BMI 30.0-34.9) 09/19/2017    Heart palpitations 06/13/2017    Screening for alcoholism 03/13/2017    Controlled type 2 diabetes mellitus without complication, without long-term current use of insulin ([facility] 03/02/2017    History of tympanoplasty of left ear 02/06/2017    Vertigo 01/04/2017    Mixed hyperlipidemia 12/09/2016    Low vitamin D level 12/09/2016    Perennial allergic rhinitis 12/09/2016    Gastroesophageal reflux disease without esophagitis 12/09/2016    Routine general medical examination at a health care facility 12/09/2016       Hospital Course: Pt is a 68 y.o. M with h/o HLP and DM II- diet controlled who presented to [facility] ER with new onset of tachycardia after initially presenting to PCP with general malaise and weakness. He felt heart racing with no associated chest pain, nausea, vomiting or diaphoresis. He had more tachycardia and EMS was summoned and he presented in AFib with RVR.  He was given IV cardizem 20 mg in field with spontaneous conversion to NSR but with brief return of afib. He was admitted on IV Cardizem and IV heparin with plan for possible LINCOLN/eCV. The following morning he was in NSR with short runs of AF and LINCOLN/eCV was not planned. Pt was transitioned to PO Cardizem and Eliquis. Flecainide was added by Dr. Hershall Bumpers. Follow up 2 h ECG was NSR and Pt wanted to be DC home. Pt was up feeling well without any complaints of CP, SOB or palpitations. Pt's labs were WNL. Pt was seen and examined by Dr. Hershall Bumpers and determined stable and ready for discharge. DISPOSITION: The patient is being discharged home in stable condition on a low saturated fat, low cholesterol and low salt diet. Pt is instructed to advance activities as tolerated limited to fatigue or shortness of breath. Pt is instructed to call office or return to ER for immediate evaluation of any shortness of breath or chest pain. Follow up with [facility] Cardiology Dr. Hershall Bumpers on 1/11 at [address] [place] [address] office  Follow up with PCP in 1-2 weeks    Discharge Exam:   Visit Vitals  /66 (BP 1 Location: Left arm, BP Patient Position: At rest) Comment: Simultaneous filing. User may not have seen previous data. Pulse 66 Comment: Simultaneous filing. User may not have seen previous data. Temp 98.1 °F (36.7 °C)   Resp 17   Ht 5' 10\" (1.778 m)   Wt 100.7 kg (222 lb 1.6 oz)   SpO2 95%   BMI 31.87 kg/m²   Pt has been seen by Dr. Hershall Bumpers: see his progress note for exam details. Recent Results (from the past 24 hour(s))   EKG, 12 LEAD, INITIAL    Collection Time: 12/19/18  4:27 PM   Result Value Ref Range    Ventricular Rate 140 BPM    Atrial Rate 357 BPM    QRS Duration 88 ms    Q-T Interval 314 ms    QTC Calculation (Bezet) 479 ms    Calculated P Axis 0 degrees    Calculated R Axis -25 degrees    Calculated T Axis 52 degrees    Diagnosis       !! AGE AND GENDER SPECIFIC ECG ANALYSIS !!   Atrial flutter with variable A-V block  Possible Anterior infarct , age undetermined  Abnormal ECG  No previous ECGs available  Confirmed by [facility]  MD (), Emerson LEE ([id number]) on 12/20/2018 7:10:50 AM     CBC WITH AUTOMATED DIFF    Collection Time: 12/19/18  4:35 PM   Result Value Ref Range    WBC 5.6 4.3 - 11.1 K/uL    RBC 4.86 4.23 - 5.6 M/uL    HGB 14.5 13.6 - 17.2 g/dL    HCT 44.5 41.1 - 50.3 %    MCV 91.6 79.6 - 97.8 FL    MCH 29.8 26.1 - 32.9 PG    MCHC 32.6 31.4 - 35.0 g/dL    RDW 14.0 11.9 - 14.6 %    PLATELET 230 606 - 379 K/uL    MPV 11.1 9.4 - 12.3 FL    ABSOLUTE NRBC 0.00 0.0 - 0.2 K/uL    DF AUTOMATED      NEUTROPHILS 61 43 - 78 %    LYMPHOCYTES 24 13 - 44 %    MONOCYTES 10 4.0 - 12.0 %    EOSINOPHILS 5 0.5 - 7.8 %    BASOPHILS 1 0.0 - 2.0 %    IMMATURE GRANULOCYTES 0 0.0 - 5.0 %    ABS. NEUTROPHILS 3.4 1.7 - 8.2 K/UL    ABS. LYMPHOCYTES 1.3 0.5 - 4.6 K/UL    ABS. MONOCYTES 0.6 0.1 - 1.3 K/UL    ABS. EOSINOPHILS 0.3 0.0 - 0.8 K/UL    ABS. BASOPHILS 0.0 0.0 - 0.2 K/UL    ABS. IMM. GRANS. 0.0 0.0 - 0.5 K/UL   METABOLIC PANEL, COMPREHENSIVE    Collection Time: 12/19/18  4:35 PM   Result Value Ref Range    Sodium 144 136 - 145 mmol/L    Potassium 3.6 3.5 - 5.1 mmol/L    Chloride 108 (H) 98 - 107 mmol/L    CO2 27 21 - 32 mmol/L    Anion gap 9 7 - 16 mmol/L    Glucose 93 65 - 100 mg/dL    BUN 16 8 - 23 MG/DL    Creatinine 1.35 0.8 - 1.5 MG/DL    GFR est AA >60 >60 ml/min/1.73m2    GFR est non-AA 55 (L) >60 ml/min/1.73m2    Calcium 8.7 8.3 - 10.4 MG/DL    Bilirubin, total 0.5 0.2 - 1.1 MG/DL    ALT (SGPT) 29 12 - 65 U/L    AST (SGOT) 20 15 - 37 U/L    Alk.  phosphatase 88 50 - 136 U/L    Protein, total 7.0 6.3 - 8.2 g/dL    Albumin 3.8 3.2 - 4.6 g/dL    Globulin 3.2 2.3 - 3.5 g/dL    A-G Ratio 1.2 1.2 - 3.5     TROPONIN I    Collection Time: 12/19/18  4:35 PM   Result Value Ref Range    Troponin-I, Qt. <0.02 (L) 0.02 - 0.05 NG/ML   TSH 3RD GENERATION    Collection Time: 12/19/18  4:35 PM   Result Value Ref Range    TSH 1.180 0.358 - 3.740 uIU/mL D DIMER    Collection Time: 12/19/18  4:35 PM   Result Value Ref Range    D DIMER 0.33 <0.56 ug/ml(FEU)   EKG, 12 LEAD, INITIAL    Collection Time: 12/19/18  6:09 PM   Result Value Ref Range    Ventricular Rate 69 BPM    Atrial Rate 69 BPM    P-R Interval 152 ms    QRS Duration 98 ms    Q-T Interval 386 ms    QTC Calculation (Bezet) 413 ms    Calculated P Axis 39 degrees    Calculated R Axis -22 degrees    Calculated T Axis 41 degrees    Diagnosis       !! AGE AND GENDER SPECIFIC ECG ANALYSIS !! Normal sinus rhythm  Possible Left atrial enlargement  Possible Anterior infarct (cited on or before 19-DEC-2018)  Abnormal ECG  When compared with ECG of 19-DEC-2018 16:27,  Sinus rhythm has replaced Atrial flutter  Vent.  rate has decreased BY  71 BPM  Confirmed by NIKKIE METZ (), AL LEE ([id number]) on 12/20/2018 7:11:46 AM     PROTHROMBIN TIME + INR    Collection Time: 12/19/18  7:26 PM   Result Value Ref Range    Prothrombin time 14.7 (H) 11.7 - 14.5 sec    INR 1.2     TROPONIN I    Collection Time: 12/19/18  9:24 PM   Result Value Ref Range    Troponin-I, Qt. <0.02 (L) 0.02 - 9.88 NG/ML   METABOLIC PANEL, BASIC    Collection Time: 12/20/18  4:09 AM   Result Value Ref Range    Sodium 141 136 - 145 mmol/L    Potassium 3.6 3.5 - 5.1 mmol/L    Chloride 109 (H) 98 - 107 mmol/L    CO2 22 21 - 32 mmol/L    Anion gap 10 7 - 16 mmol/L    Glucose 158 (H) 65 - 100 mg/dL    BUN 19 8 - 23 MG/DL    Creatinine 1.11 0.8 - 1.5 MG/DL    GFR est AA >60 >60 ml/min/1.73m2    GFR est non-AA >60 >60 ml/min/1.73m2    Calcium 8.3 8.3 - 10.4 MG/DL   CBC W/O DIFF    Collection Time: 12/20/18  4:09 AM   Result Value Ref Range    WBC 7.7 4.3 - 11.1 K/uL    RBC 4.52 4.23 - 5.6 M/uL    HGB 13.6 13.6 - 17.2 g/dL    HCT 41.0 (L) 41.1 - 50.3 %    MCV 90.7 79.6 - 97.8 FL    MCH 30.1 26.1 - 32.9 PG    MCHC 33.2 31.4 - 35.0 g/dL    RDW 14.1 11.9 - 14.6 %    PLATELET 304 370 - 362 K/uL    MPV 11.3 9.4 - 12.3 FL    ABSOLUTE NRBC 0.00 0.0 - 0.2 K/uL   PTT Collection Time: 12/20/18  4:09 AM   Result Value Ref Range    aPTT 57.2 (H) 24.7 - 39.8 SEC   LIPID PANEL    Collection Time: 12/20/18  4:09 AM   Result Value Ref Range    LIPID PROFILE          Cholesterol, total 138 <200 MG/DL    Triglyceride 80 35 - 150 MG/DL    HDL Cholesterol 39 (L) 40 - 60 MG/DL    LDL, calculated 83 <100 MG/DL    VLDL, calculated 16 6.0 - 23.0 MG/DL    CHOL/HDL Ratio 3.5     TROPONIN I    Collection Time: 12/20/18  4:09 AM   Result Value Ref Range    Troponin-I, Qt. <0.02 (L) 0.02 - 0.05 NG/ML   PTT    Collection Time: 12/20/18 10:43 AM   Result Value Ref Range    aPTT 39.5 24.7 - 39.8 SEC         Patient Instructions:   Current Discharge Medication List      START taking these medications    Details   flecainide (TAMBOCOR) 100 mg tablet Take 1 Tab by mouth two (2) times a day for 30 days. Qty: 60 Tab, Refills: 3      apixaban (ELIQUIS) 5 mg tablet Take 1 Tab by mouth every twelve (12) hours. Qty: 60 Tab, Refills: 3      dilTIAZem CD (CARDIZEM CD) 120 mg ER capsule Take 1 Cap by mouth daily. Qty: 30 Cap, Refills: 3         CONTINUE these medications which have NOT CHANGED    Details   rosuvastatin (CRESTOR) 10 mg tablet Take 1 Tab by mouth daily. Qty: 90 Tab, Refills: 1    Associated Diagnoses: Mixed hyperlipidemia      aspirin delayed-release 81 mg tablet Take 81 mg by mouth daily. Pt states stopped aspirin per surgeon's request.      loratadine (CLARITIN) 10 mg tablet Take 1 Tab by mouth daily. Qty: 30 Tab, Refills: 3    Associated Diagnoses: Seasonal allergic rhinitis, unspecified trigger      fluticasone (FLONASE) 50 mcg/actuation nasal spray 2 Sprays by Both Nostrils route daily. Qty: 1 Bottle, Refills: 3    Associated Diagnoses: Disorder of both eustachian tubes      esomeprazole (NEXIUM) 40 mg capsule Take 1 Cap by mouth daily. Qty: 90 Cap, Refills: 1      ofloxacin (FLOXIN) 0.3 % otic solution Administer 5 Drops in right ear daily.   Qty: 3 mL, Refills: 1      VITAMIN B COMPLEX (VITAMIN B-50 PO) Take 1 Tab by mouth daily. cholecalciferol (VITAMIN D3) 1,000 unit tablet Take 1,000 Units by mouth daily. flaxseed oil 1,000 mg cap Take 1 Cap by mouth daily. DOCOSAHEXANOIC ACID/EPA (FISH OIL PO) Take 1 Cap by mouth daily.                SignedSandor LETICIA Barroso  12/20/2018  1:55 PM

## 2018-12-31 PROBLEM — K59.03 DRUG-INDUCED CONSTIPATION: Status: ACTIVE | Noted: 2018-12-31

## 2019-04-09 ENCOUNTER — APPOINTMENT (RX ONLY)
Dept: URBAN - METROPOLITAN AREA CLINIC 24 | Facility: CLINIC | Age: 74
Setting detail: DERMATOLOGY
End: 2019-04-09

## 2019-04-09 DIAGNOSIS — Z85.828 PERSONAL HISTORY OF OTHER MALIGNANT NEOPLASM OF SKIN: ICD-10-CM

## 2019-04-09 DIAGNOSIS — D18.0 HEMANGIOMA: ICD-10-CM

## 2019-04-09 DIAGNOSIS — L81.4 OTHER MELANIN HYPERPIGMENTATION: ICD-10-CM

## 2019-04-09 DIAGNOSIS — L82.1 OTHER SEBORRHEIC KERATOSIS: ICD-10-CM

## 2019-04-09 DIAGNOSIS — L57.0 ACTINIC KERATOSIS: ICD-10-CM

## 2019-04-09 PROBLEM — D18.01 HEMANGIOMA OF SKIN AND SUBCUTANEOUS TISSUE: Status: ACTIVE | Noted: 2019-04-09

## 2019-04-09 PROBLEM — E13.9 OTHER SPECIFIED DIABETES MELLITUS WITHOUT COMPLICATIONS: Status: ACTIVE | Noted: 2019-04-09

## 2019-04-09 PROCEDURE — 17004 DESTROY PREMAL LESIONS 15/>: CPT

## 2019-04-09 PROCEDURE — ? LIQUID NITROGEN

## 2019-04-09 PROCEDURE — 99214 OFFICE O/P EST MOD 30 MIN: CPT | Mod: 25

## 2019-04-09 PROCEDURE — ? COUNSELING

## 2019-04-09 ASSESSMENT — LOCATION DETAILED DESCRIPTION DERM
LOCATION DETAILED: LEFT DISTAL DORSAL FOREARM
LOCATION DETAILED: RIGHT MEDIAL UPPER BACK
LOCATION DETAILED: LEFT POSTERIOR SHOULDER
LOCATION DETAILED: INFERIOR THORACIC SPINE
LOCATION DETAILED: LEFT PROXIMAL DORSAL FOREARM
LOCATION DETAILED: LEFT TRAGUS
LOCATION DETAILED: LEFT RADIAL DORSAL HAND
LOCATION DETAILED: RIGHT DISTAL DORSAL FOREARM
LOCATION DETAILED: LEFT LATERAL INFERIOR CHEST
LOCATION DETAILED: RIGHT SUPERIOR LATERAL MALAR CHEEK
LOCATION DETAILED: RIGHT LATERAL MALAR CHEEK
LOCATION DETAILED: LEFT CENTRAL MALAR CHEEK
LOCATION DETAILED: LEFT CENTRAL ZYGOMA
LOCATION DETAILED: RIGHT SUPERIOR MEDIAL MALAR CHEEK
LOCATION DETAILED: EPIGASTRIC SKIN
LOCATION DETAILED: RIGHT SUPERIOR LATERAL BUCCAL CHEEK
LOCATION DETAILED: RIGHT DISTAL RADIAL DORSAL FOREARM
LOCATION DETAILED: RIGHT POSTERIOR SHOULDER
LOCATION DETAILED: LEFT ULNAR DORSAL HAND
LOCATION DETAILED: LEFT SUPERIOR FOREHEAD
LOCATION DETAILED: LEFT FOREHEAD

## 2019-04-09 ASSESSMENT — LOCATION SIMPLE DESCRIPTION DERM
LOCATION SIMPLE: RIGHT CHEEK
LOCATION SIMPLE: UPPER BACK
LOCATION SIMPLE: RIGHT SHOULDER
LOCATION SIMPLE: LEFT FOREHEAD
LOCATION SIMPLE: CHEST
LOCATION SIMPLE: RIGHT FOREARM
LOCATION SIMPLE: LEFT ZYGOMA
LOCATION SIMPLE: LEFT HAND
LOCATION SIMPLE: LEFT SHOULDER
LOCATION SIMPLE: LEFT FOREARM
LOCATION SIMPLE: RIGHT UPPER BACK
LOCATION SIMPLE: ABDOMEN
LOCATION SIMPLE: LEFT EAR
LOCATION SIMPLE: LEFT CHEEK

## 2019-04-09 ASSESSMENT — LOCATION ZONE DERM
LOCATION ZONE: ARM
LOCATION ZONE: EAR
LOCATION ZONE: FACE
LOCATION ZONE: HAND
LOCATION ZONE: TRUNK

## 2019-10-08 ENCOUNTER — APPOINTMENT (RX ONLY)
Dept: URBAN - METROPOLITAN AREA CLINIC 24 | Facility: CLINIC | Age: 74
Setting detail: DERMATOLOGY
End: 2019-10-08

## 2019-10-08 DIAGNOSIS — D18.0 HEMANGIOMA: ICD-10-CM

## 2019-10-08 DIAGNOSIS — D485 NEOPLASM OF UNCERTAIN BEHAVIOR OF SKIN: ICD-10-CM

## 2019-10-08 DIAGNOSIS — L57.0 ACTINIC KERATOSIS: ICD-10-CM

## 2019-10-08 DIAGNOSIS — L81.4 OTHER MELANIN HYPERPIGMENTATION: ICD-10-CM

## 2019-10-08 DIAGNOSIS — Z85.828 PERSONAL HISTORY OF OTHER MALIGNANT NEOPLASM OF SKIN: ICD-10-CM

## 2019-10-08 DIAGNOSIS — L82.1 OTHER SEBORRHEIC KERATOSIS: ICD-10-CM

## 2019-10-08 PROBLEM — L55.1 SUNBURN OF SECOND DEGREE: Status: ACTIVE | Noted: 2019-10-08

## 2019-10-08 PROBLEM — E13.9 OTHER SPECIFIED DIABETES MELLITUS WITHOUT COMPLICATIONS: Status: ACTIVE | Noted: 2019-10-08

## 2019-10-08 PROBLEM — D18.01 HEMANGIOMA OF SKIN AND SUBCUTANEOUS TISSUE: Status: ACTIVE | Noted: 2019-10-08

## 2019-10-08 PROBLEM — E78.5 HYPERLIPIDEMIA, UNSPECIFIED: Status: ACTIVE | Noted: 2019-10-08

## 2019-10-08 PROBLEM — D48.5 NEOPLASM OF UNCERTAIN BEHAVIOR OF SKIN: Status: ACTIVE | Noted: 2019-10-08

## 2019-10-08 PROCEDURE — 17000 DESTRUCT PREMALG LESION: CPT | Mod: 59

## 2019-10-08 PROCEDURE — 11102 TANGNTL BX SKIN SINGLE LES: CPT

## 2019-10-08 PROCEDURE — ? BIOPSY BY SHAVE METHOD

## 2019-10-08 PROCEDURE — 99213 OFFICE O/P EST LOW 20 MIN: CPT | Mod: 25

## 2019-10-08 PROCEDURE — 17003 DESTRUCT PREMALG LES 2-14: CPT

## 2019-10-08 PROCEDURE — ? COUNSELING

## 2019-10-08 PROCEDURE — ? LIQUID NITROGEN

## 2019-10-08 ASSESSMENT — LOCATION DETAILED DESCRIPTION DERM
LOCATION DETAILED: LEFT ULNAR DORSAL HAND
LOCATION DETAILED: LEFT DISTAL DORSAL FOREARM
LOCATION DETAILED: LEFT SUPERIOR CENTRAL MALAR CHEEK
LOCATION DETAILED: RIGHT SCAPHA
LOCATION DETAILED: LEFT MEDIAL FOREHEAD
LOCATION DETAILED: INFERIOR THORACIC SPINE
LOCATION DETAILED: RIGHT POSTERIOR SHOULDER
LOCATION DETAILED: RIGHT DISTAL DORSAL FOREARM
LOCATION DETAILED: LEFT LATERAL INFERIOR CHEST
LOCATION DETAILED: RIGHT LATERAL FOREHEAD
LOCATION DETAILED: LEFT LATERAL FOREHEAD
LOCATION DETAILED: LEFT LATERAL ZYGOMA
LOCATION DETAILED: RIGHT RADIAL DORSAL HAND
LOCATION DETAILED: RIGHT MEDIAL UPPER BACK
LOCATION DETAILED: RIGHT LATERAL MALAR CHEEK
LOCATION DETAILED: RIGHT CENTRAL ZYGOMA
LOCATION DETAILED: LEFT POSTERIOR SHOULDER
LOCATION DETAILED: LEFT PROXIMAL DORSAL FOREARM
LOCATION DETAILED: EPIGASTRIC SKIN
LOCATION DETAILED: LEFT SUPERIOR FOREHEAD

## 2019-10-08 ASSESSMENT — LOCATION SIMPLE DESCRIPTION DERM
LOCATION SIMPLE: LEFT FOREHEAD
LOCATION SIMPLE: UPPER BACK
LOCATION SIMPLE: RIGHT FOREARM
LOCATION SIMPLE: RIGHT SHOULDER
LOCATION SIMPLE: RIGHT CHEEK
LOCATION SIMPLE: LEFT FOREARM
LOCATION SIMPLE: CHEST
LOCATION SIMPLE: RIGHT ZYGOMA
LOCATION SIMPLE: LEFT HAND
LOCATION SIMPLE: RIGHT EAR
LOCATION SIMPLE: LEFT CHEEK
LOCATION SIMPLE: LEFT ZYGOMA
LOCATION SIMPLE: ABDOMEN
LOCATION SIMPLE: RIGHT HAND
LOCATION SIMPLE: RIGHT FOREHEAD
LOCATION SIMPLE: RIGHT UPPER BACK
LOCATION SIMPLE: LEFT SHOULDER

## 2019-10-08 ASSESSMENT — LOCATION ZONE DERM
LOCATION ZONE: HAND
LOCATION ZONE: EAR
LOCATION ZONE: ARM
LOCATION ZONE: FACE
LOCATION ZONE: TRUNK

## 2019-10-08 NOTE — PROCEDURE: LIQUID NITROGEN
Post-Care Instructions: I reviewed with the patient in detail post-care instructions. Patient is to wear sunprotection, and avoid picking at any of the treated lesions. Pt may apply Vaseline to crusted or scabbing areas.
Consent: The patient's consent was obtained including but not limited to risks of crusting, scabbing, blistering, scarring, darker or lighter pigmentary change, recurrence, incomplete removal and infection.
Render Note In Bullet Format When Appropriate: No
Detail Level: Detailed
no
Duration Of Freeze Thaw-Cycle (Seconds): 0

## 2019-10-08 NOTE — PROCEDURE: BIOPSY BY SHAVE METHOD
Destruction After The Procedure: No
Type Of Destruction Used: Curettage
Additional Anesthesia Volume In Cc (Will Not Render If 0): 0
Accession #: [id number]
Post-care instructions were reviewed in detail and written instructions are provided. Patient is to keep the biopsy site dry overnight, and then apply bacitracin twice daily until healed. Patient may apply hydrogen peroxide soaks to remove any crusting.
Notification Instructions: Patient will be notified of biopsy results. However, patient instructed to call the office if not contacted within 2 weeks.
Biopsy Method: Dermablade
Dressing: bandage
Biopsy Type: H and E
Wound Care: Vaseline
Depth Of Biopsy: dermis
Detail Level: Detailed
Electrodesiccation Text: The wound bed was treated with electrodesiccation after the biopsy was performed.
Consent: Written consent was obtained and risks were reviewed including but not limited to scarring, infection, bleeding, scabbing, incomplete removal, and allergy to anesthesia.
Hemostasis: Aluminum Chloride
Was A Bandage Applied: Yes
Cryotherapy Text: The wound bed was treated with cryotherapy after the biopsy was performed.
Electrodesiccation And Curettage Text: The wound bed was treated with electrodesiccation and curettage after the biopsy was performed.
Anesthesia Volume In Cc: 0.3
Billing Type: Third-Party Bill
Anesthesia Type: 1% lidocaine with epinephrine
Silver Nitrate Text: The wound bed was treated with silver nitrate after the biopsy was performed.

## 2019-10-25 PROBLEM — H65.113 ACUTE MUCOID OTITIS MEDIA OF BOTH EARS: Status: ACTIVE | Noted: 2019-10-25

## 2019-12-03 PROBLEM — S61.412A LACERATION OF LEFT HAND WITHOUT FOREIGN BODY: Status: ACTIVE | Noted: 2019-12-03

## 2019-12-03 PROBLEM — I48.91 ATRIAL FIBRILLATION WITH RVR (HCC): Status: RESOLVED | Noted: 2018-12-19 | Resolved: 2019-12-03

## 2020-01-06 PROBLEM — C44.310 BASAL CELL CARCINOMA (BCC) OF SKIN OF FACE: Status: ACTIVE | Noted: 2020-01-06

## 2020-01-06 PROBLEM — C44.92 SCC (SQUAMOUS CELL CARCINOMA): Status: ACTIVE | Noted: 2020-01-06

## 2020-04-20 ENCOUNTER — APPOINTMENT (RX ONLY)
Dept: URBAN - METROPOLITAN AREA CLINIC 24 | Facility: CLINIC | Age: 75
Setting detail: DERMATOLOGY
End: 2020-04-20

## 2020-04-20 DIAGNOSIS — L57.0 ACTINIC KERATOSIS: ICD-10-CM

## 2020-04-20 DIAGNOSIS — L82.1 OTHER SEBORRHEIC KERATOSIS: ICD-10-CM

## 2020-04-20 DIAGNOSIS — D22 MELANOCYTIC NEVI: ICD-10-CM

## 2020-04-20 DIAGNOSIS — Z85.828 PERSONAL HISTORY OF OTHER MALIGNANT NEOPLASM OF SKIN: ICD-10-CM

## 2020-04-20 DIAGNOSIS — D18.0 HEMANGIOMA: ICD-10-CM

## 2020-04-20 DIAGNOSIS — L81.4 OTHER MELANIN HYPERPIGMENTATION: ICD-10-CM

## 2020-04-20 PROBLEM — D18.01 HEMANGIOMA OF SKIN AND SUBCUTANEOUS TISSUE: Status: ACTIVE | Noted: 2020-04-20

## 2020-04-20 PROBLEM — L55.1 SUNBURN OF SECOND DEGREE: Status: ACTIVE | Noted: 2020-04-20

## 2020-04-20 PROBLEM — J30.1 ALLERGIC RHINITIS DUE TO POLLEN: Status: ACTIVE | Noted: 2020-04-20

## 2020-04-20 PROBLEM — D22.0 MELANOCYTIC NEVI OF LIP: Status: ACTIVE | Noted: 2020-04-20

## 2020-04-20 PROCEDURE — 17004 DESTROY PREMAL LESIONS 15/>: CPT

## 2020-04-20 PROCEDURE — ? COUNSELING

## 2020-04-20 PROCEDURE — ? LIQUID NITROGEN

## 2020-04-20 PROCEDURE — 99213 OFFICE O/P EST LOW 20 MIN: CPT | Mod: 25

## 2020-04-20 ASSESSMENT — LOCATION ZONE DERM
LOCATION ZONE: NOSE
LOCATION ZONE: TRUNK
LOCATION ZONE: LEG
LOCATION ZONE: LIP
LOCATION ZONE: ARM
LOCATION ZONE: HAND
LOCATION ZONE: FACE

## 2020-04-20 ASSESSMENT — LOCATION DETAILED DESCRIPTION DERM
LOCATION DETAILED: RIGHT INFERIOR PREAURICULAR CHEEK
LOCATION DETAILED: RIGHT DISTAL DORSAL FOREARM
LOCATION DETAILED: RIGHT POSTERIOR LATERAL MALLEOLUS
LOCATION DETAILED: LEFT SUPERIOR FOREHEAD
LOCATION DETAILED: RIGHT INFERIOR FOREHEAD
LOCATION DETAILED: LEFT RADIAL DORSAL HAND
LOCATION DETAILED: LEFT SUPERIOR PREAURICULAR CHEEK
LOCATION DETAILED: LEFT SUPERIOR MEDIAL FOREHEAD
LOCATION DETAILED: RIGHT INFERIOR MEDIAL UPPER BACK
LOCATION DETAILED: LEFT ACHILLES SKIN
LOCATION DETAILED: RIGHT POSTERIOR SHOULDER
LOCATION DETAILED: LEFT POSTERIOR SHOULDER
LOCATION DETAILED: LEFT CENTRAL TEMPLE
LOCATION DETAILED: LEFT INFERIOR CENTRAL MALAR CHEEK
LOCATION DETAILED: RIGHT SUPERIOR CENTRAL MALAR CHEEK
LOCATION DETAILED: LEFT LATERAL INFERIOR CHEST
LOCATION DETAILED: RIGHT UPPER CUTANEOUS LIP
LOCATION DETAILED: EPIGASTRIC SKIN
LOCATION DETAILED: NASAL SUPRATIP
LOCATION DETAILED: LEFT DISTAL DORSAL FOREARM
LOCATION DETAILED: RIGHT DISTAL RADIAL DORSAL FOREARM
LOCATION DETAILED: LEFT MEDIAL FOREHEAD
LOCATION DETAILED: RIGHT INFERIOR CENTRAL MALAR CHEEK
LOCATION DETAILED: LEFT ULNAR DORSAL HAND
LOCATION DETAILED: LEFT SUPERIOR CENTRAL MALAR CHEEK
LOCATION DETAILED: LEFT SUPERIOR CENTRAL BUCCAL CHEEK

## 2020-04-20 ASSESSMENT — LOCATION SIMPLE DESCRIPTION DERM
LOCATION SIMPLE: RIGHT LIP
LOCATION SIMPLE: RIGHT ANKLE
LOCATION SIMPLE: CHEST
LOCATION SIMPLE: LEFT FOREARM
LOCATION SIMPLE: NOSE
LOCATION SIMPLE: LEFT FOREHEAD
LOCATION SIMPLE: LEFT ACHILLES SKIN
LOCATION SIMPLE: LEFT TEMPLE
LOCATION SIMPLE: RIGHT UPPER BACK
LOCATION SIMPLE: ABDOMEN
LOCATION SIMPLE: RIGHT CHEEK
LOCATION SIMPLE: LEFT CHEEK
LOCATION SIMPLE: LEFT HAND
LOCATION SIMPLE: RIGHT FOREHEAD
LOCATION SIMPLE: LEFT SHOULDER
LOCATION SIMPLE: RIGHT SHOULDER
LOCATION SIMPLE: RIGHT FOREARM

## 2020-06-02 PROBLEM — M17.11 ARTHRITIS OF RIGHT KNEE: Status: ACTIVE | Noted: 2020-06-02

## 2020-06-02 PROBLEM — H66.001 NON-RECURRENT ACUTE SUPPURATIVE OTITIS MEDIA OF RIGHT EAR WITHOUT SPONTANEOUS RUPTURE OF TYMPANIC MEMBRANE: Status: ACTIVE | Noted: 2020-06-02

## 2020-09-03 PROBLEM — E86.0 DEHYDRATION: Status: ACTIVE | Noted: 2020-09-03

## 2020-09-16 PROBLEM — H66.006 RECURRENT ACUTE SUPPURATIVE OTITIS MEDIA WITHOUT SPONTANEOUS RUPTURE OF TYMPANIC MEMBRANE OF BOTH SIDES: Status: ACTIVE | Noted: 2020-06-02

## 2020-10-20 ENCOUNTER — APPOINTMENT (RX ONLY)
Dept: URBAN - METROPOLITAN AREA CLINIC 24 | Facility: CLINIC | Age: 75
Setting detail: DERMATOLOGY
End: 2020-10-20

## 2020-10-20 DIAGNOSIS — L82.1 OTHER SEBORRHEIC KERATOSIS: ICD-10-CM

## 2020-10-20 DIAGNOSIS — D22 MELANOCYTIC NEVI: ICD-10-CM

## 2020-10-20 DIAGNOSIS — D18.0 HEMANGIOMA: ICD-10-CM

## 2020-10-20 DIAGNOSIS — L81.4 OTHER MELANIN HYPERPIGMENTATION: ICD-10-CM

## 2020-10-20 DIAGNOSIS — L57.0 ACTINIC KERATOSIS: ICD-10-CM

## 2020-10-20 DIAGNOSIS — Z85.828 PERSONAL HISTORY OF OTHER MALIGNANT NEOPLASM OF SKIN: ICD-10-CM

## 2020-10-20 PROBLEM — D18.01 HEMANGIOMA OF SKIN AND SUBCUTANEOUS TISSUE: Status: ACTIVE | Noted: 2020-10-20

## 2020-10-20 PROBLEM — D22.0 MELANOCYTIC NEVI OF LIP: Status: ACTIVE | Noted: 2020-10-20

## 2020-10-20 PROCEDURE — 99214 OFFICE O/P EST MOD 30 MIN: CPT | Mod: 25

## 2020-10-20 PROCEDURE — ? LIQUID NITROGEN

## 2020-10-20 PROCEDURE — 17004 DESTROY PREMAL LESIONS 15/>: CPT

## 2020-10-20 PROCEDURE — ? COUNSELING

## 2020-10-20 ASSESSMENT — LOCATION DETAILED DESCRIPTION DERM
LOCATION DETAILED: RIGHT MEDIAL ZYGOMA
LOCATION DETAILED: RIGHT UPPER CUTANEOUS LIP
LOCATION DETAILED: RIGHT POSTERIOR LATERAL MALLEOLUS
LOCATION DETAILED: LEFT POSTERIOR SHOULDER
LOCATION DETAILED: LEFT LATERAL ZYGOMA
LOCATION DETAILED: LEFT ACHILLES SKIN
LOCATION DETAILED: RIGHT POSTERIOR SHOULDER
LOCATION DETAILED: LEFT FOREHEAD
LOCATION DETAILED: EPIGASTRIC SKIN
LOCATION DETAILED: LEFT SCAPHA
LOCATION DETAILED: LEFT INFERIOR FOREHEAD
LOCATION DETAILED: RIGHT FOREHEAD
LOCATION DETAILED: RIGHT LATERAL TEMPLE
LOCATION DETAILED: RIGHT SUPERIOR HELIX
LOCATION DETAILED: LEFT ULNAR DORSAL HAND
LOCATION DETAILED: LEFT SUPERIOR FOREHEAD
LOCATION DETAILED: RIGHT INFERIOR MEDIAL UPPER BACK
LOCATION DETAILED: RIGHT SUPERIOR FOREHEAD
LOCATION DETAILED: RIGHT SUPERIOR MEDIAL FOREHEAD
LOCATION DETAILED: LEFT CENTRAL MALAR CHEEK
LOCATION DETAILED: LEFT LATERAL INFERIOR CHEST
LOCATION DETAILED: RIGHT MEDIAL FOREHEAD
LOCATION DETAILED: LEFT SUPERIOR MEDIAL FOREHEAD

## 2020-10-20 ASSESSMENT — LOCATION SIMPLE DESCRIPTION DERM
LOCATION SIMPLE: LEFT EAR
LOCATION SIMPLE: LEFT HAND
LOCATION SIMPLE: RIGHT EAR
LOCATION SIMPLE: ABDOMEN
LOCATION SIMPLE: RIGHT ZYGOMA
LOCATION SIMPLE: LEFT ZYGOMA
LOCATION SIMPLE: RIGHT FOREHEAD
LOCATION SIMPLE: RIGHT TEMPLE
LOCATION SIMPLE: RIGHT ANKLE
LOCATION SIMPLE: LEFT SHOULDER
LOCATION SIMPLE: RIGHT SHOULDER
LOCATION SIMPLE: LEFT CHEEK
LOCATION SIMPLE: RIGHT LIP
LOCATION SIMPLE: LEFT ACHILLES SKIN
LOCATION SIMPLE: CHEST
LOCATION SIMPLE: LEFT FOREHEAD
LOCATION SIMPLE: RIGHT UPPER BACK

## 2020-10-20 ASSESSMENT — LOCATION ZONE DERM
LOCATION ZONE: EAR
LOCATION ZONE: HAND
LOCATION ZONE: FACE
LOCATION ZONE: ARM
LOCATION ZONE: TRUNK
LOCATION ZONE: LEG
LOCATION ZONE: LIP

## 2021-02-11 ENCOUNTER — HOSPITAL ENCOUNTER (OUTPATIENT)
Dept: LAB | Age: 76
Discharge: HOME | End: 2021-02-11

## 2021-02-11 PROCEDURE — 88305 TISSUE EXAM BY PATHOLOGIST: CPT

## 2021-02-11 PROCEDURE — 88312 SPECIAL STAINS GROUP 1: CPT

## 2021-04-27 ENCOUNTER — APPOINTMENT (RX ONLY)
Dept: URBAN - METROPOLITAN AREA CLINIC 24 | Facility: CLINIC | Age: 76
Setting detail: DERMATOLOGY
End: 2021-04-27

## 2021-04-27 DIAGNOSIS — D18.0 HEMANGIOMA: ICD-10-CM

## 2021-04-27 DIAGNOSIS — L57.0 ACTINIC KERATOSIS: ICD-10-CM

## 2021-04-27 DIAGNOSIS — L82.1 OTHER SEBORRHEIC KERATOSIS: ICD-10-CM

## 2021-04-27 DIAGNOSIS — D22 MELANOCYTIC NEVI: ICD-10-CM

## 2021-04-27 DIAGNOSIS — L57.8 OTHER SKIN CHANGES DUE TO CHRONIC EXPOSURE TO NONIONIZING RADIATION: ICD-10-CM | Status: INADEQUATELY CONTROLLED

## 2021-04-27 DIAGNOSIS — Z85.828 PERSONAL HISTORY OF OTHER MALIGNANT NEOPLASM OF SKIN: ICD-10-CM

## 2021-04-27 PROBLEM — E13.9 OTHER SPECIFIED DIABETES MELLITUS WITHOUT COMPLICATIONS: Status: ACTIVE | Noted: 2021-04-27

## 2021-04-27 PROBLEM — D22.0 MELANOCYTIC NEVI OF LIP: Status: ACTIVE | Noted: 2021-04-27

## 2021-04-27 PROBLEM — D18.01 HEMANGIOMA OF SKIN AND SUBCUTANEOUS TISSUE: Status: ACTIVE | Noted: 2021-04-27

## 2021-04-27 PROCEDURE — 99214 OFFICE O/P EST MOD 30 MIN: CPT | Mod: 25

## 2021-04-27 PROCEDURE — ? PRESCRIPTION

## 2021-04-27 PROCEDURE — 17000 DESTRUCT PREMALG LESION: CPT

## 2021-04-27 PROCEDURE — ? LIQUID NITROGEN

## 2021-04-27 PROCEDURE — 17003 DESTRUCT PREMALG LES 2-14: CPT

## 2021-04-27 PROCEDURE — ? COUNSELING

## 2021-04-27 RX ORDER — FLUOROURACIL 5 MG/G
CREAM TOPICAL
Qty: 1 | Refills: 0 | Status: ERX | COMMUNITY
Start: 2021-04-27

## 2021-04-27 RX ADMIN — FLUOROURACIL: 5 CREAM TOPICAL at 00:00

## 2021-04-27 ASSESSMENT — LOCATION DETAILED DESCRIPTION DERM
LOCATION DETAILED: RIGHT DISTAL DORSAL FOREARM
LOCATION DETAILED: RIGHT INFERIOR FOREHEAD
LOCATION DETAILED: EPIGASTRIC SKIN
LOCATION DETAILED: LEFT SUPERIOR FOREHEAD
LOCATION DETAILED: RIGHT UPPER CUTANEOUS LIP
LOCATION DETAILED: RIGHT INFERIOR MEDIAL UPPER BACK
LOCATION DETAILED: LEFT ULNAR DORSAL HAND
LOCATION DETAILED: LEFT CENTRAL MALAR CHEEK
LOCATION DETAILED: RIGHT LATERAL FOREHEAD
LOCATION DETAILED: LEFT LATERAL INFERIOR CHEST
LOCATION DETAILED: LEFT ACHILLES SKIN
LOCATION DETAILED: RIGHT ULNAR DORSAL HAND
LOCATION DETAILED: RIGHT POSTERIOR LATERAL MALLEOLUS
LOCATION DETAILED: RIGHT SUPERIOR MEDIAL FOREHEAD

## 2021-04-27 ASSESSMENT — LOCATION SIMPLE DESCRIPTION DERM
LOCATION SIMPLE: RIGHT FOREARM
LOCATION SIMPLE: LEFT ACHILLES SKIN
LOCATION SIMPLE: RIGHT UPPER BACK
LOCATION SIMPLE: RIGHT ANKLE
LOCATION SIMPLE: LEFT HAND
LOCATION SIMPLE: CHEST
LOCATION SIMPLE: LEFT FOREHEAD
LOCATION SIMPLE: RIGHT LIP
LOCATION SIMPLE: ABDOMEN
LOCATION SIMPLE: RIGHT FOREHEAD
LOCATION SIMPLE: LEFT CHEEK
LOCATION SIMPLE: RIGHT HAND

## 2021-04-27 ASSESSMENT — LOCATION ZONE DERM
LOCATION ZONE: FACE
LOCATION ZONE: TRUNK
LOCATION ZONE: LEG
LOCATION ZONE: HAND
LOCATION ZONE: LIP
LOCATION ZONE: ARM

## 2021-04-27 NOTE — PROCEDURE: MIPS QUALITY
Detail Level: Detailed
Quality 110: Preventive Care And Screening: Influenza Immunization: Influenza Immunization previously received during influenza season
yes

## 2021-05-24 PROBLEM — N52.9 VASCULOGENIC ERECTILE DYSFUNCTION: Status: ACTIVE | Noted: 2021-05-24

## 2021-05-24 PROBLEM — S61.412A LACERATION OF LEFT HAND WITHOUT FOREIGN BODY: Status: RESOLVED | Noted: 2019-12-03 | Resolved: 2021-05-24

## 2021-07-15 PROBLEM — Z00.00 MEDICARE ANNUAL WELLNESS VISIT, SUBSEQUENT: Status: ACTIVE | Noted: 2021-07-15

## 2021-07-15 PROBLEM — Z11.59 ENCOUNTER FOR HEPATITIS C SCREENING TEST FOR LOW RISK PATIENT: Status: ACTIVE | Noted: 2021-07-15

## 2021-07-15 PROBLEM — Z13.31 SCREENING FOR DEPRESSION: Status: ACTIVE | Noted: 2021-07-15

## 2021-07-15 PROBLEM — H66.004 RECURRENT ACUTE SUPPURATIVE OTITIS MEDIA OF RIGHT EAR WITHOUT SPONTANEOUS RUPTURE OF TYMPANIC MEMBRANE: Status: ACTIVE | Noted: 2020-06-02

## 2021-08-03 PROBLEM — R42 DIZZINESS: Status: RESOLVED | Noted: 2018-04-04 | Resolved: 2021-08-03

## 2021-08-14 PROBLEM — Z00.00 MEDICARE ANNUAL WELLNESS VISIT, SUBSEQUENT: Status: RESOLVED | Noted: 2021-07-15 | Resolved: 2021-08-14

## 2021-10-26 ENCOUNTER — APPOINTMENT (RX ONLY)
Dept: URBAN - METROPOLITAN AREA CLINIC 24 | Facility: CLINIC | Age: 76
Setting detail: DERMATOLOGY
End: 2021-10-26

## 2021-10-26 DIAGNOSIS — L57.0 ACTINIC KERATOSIS: ICD-10-CM

## 2021-10-26 DIAGNOSIS — Z85.828 PERSONAL HISTORY OF OTHER MALIGNANT NEOPLASM OF SKIN: ICD-10-CM

## 2021-10-26 DIAGNOSIS — D18.0 HEMANGIOMA: ICD-10-CM

## 2021-10-26 DIAGNOSIS — L57.8 OTHER SKIN CHANGES DUE TO CHRONIC EXPOSURE TO NONIONIZING RADIATION: ICD-10-CM

## 2021-10-26 DIAGNOSIS — L82.1 OTHER SEBORRHEIC KERATOSIS: ICD-10-CM

## 2021-10-26 DIAGNOSIS — D22 MELANOCYTIC NEVI: ICD-10-CM

## 2021-10-26 PROBLEM — D18.01 HEMANGIOMA OF SKIN AND SUBCUTANEOUS TISSUE: Status: ACTIVE | Noted: 2021-10-26

## 2021-10-26 PROBLEM — D22.0 MELANOCYTIC NEVI OF LIP: Status: ACTIVE | Noted: 2021-10-26

## 2021-10-26 PROBLEM — H91.90 UNSPECIFIED HEARING LOSS, UNSPECIFIED EAR: Status: ACTIVE | Noted: 2021-10-26

## 2021-10-26 PROCEDURE — 17000 DESTRUCT PREMALG LESION: CPT

## 2021-10-26 PROCEDURE — 99213 OFFICE O/P EST LOW 20 MIN: CPT | Mod: 25

## 2021-10-26 PROCEDURE — 17003 DESTRUCT PREMALG LES 2-14: CPT

## 2021-10-26 PROCEDURE — ? LIQUID NITROGEN

## 2021-10-26 PROCEDURE — ? COUNSELING

## 2021-10-26 ASSESSMENT — LOCATION ZONE DERM
LOCATION ZONE: NECK
LOCATION ZONE: ARM
LOCATION ZONE: FACE
LOCATION ZONE: TRUNK
LOCATION ZONE: SCALP
LOCATION ZONE: LIP
LOCATION ZONE: HAND
LOCATION ZONE: LEG

## 2021-10-26 ASSESSMENT — LOCATION DETAILED DESCRIPTION DERM
LOCATION DETAILED: LEFT CENTRAL PARIETAL SCALP
LOCATION DETAILED: EPIGASTRIC SKIN
LOCATION DETAILED: LEFT ULNAR DORSAL HAND
LOCATION DETAILED: LEFT DISTAL DORSAL FOREARM
LOCATION DETAILED: RIGHT MEDIAL TRAPEZIAL NECK
LOCATION DETAILED: LEFT SUPERIOR FOREHEAD
LOCATION DETAILED: LEFT FOREHEAD
LOCATION DETAILED: LEFT ACHILLES SKIN
LOCATION DETAILED: RIGHT INFERIOR FOREHEAD
LOCATION DETAILED: LEFT LATERAL INFERIOR CHEST
LOCATION DETAILED: RIGHT DISTAL DORSAL FOREARM
LOCATION DETAILED: RIGHT SUPERIOR MEDIAL FOREHEAD
LOCATION DETAILED: RIGHT POSTERIOR LATERAL MALLEOLUS
LOCATION DETAILED: RIGHT UPPER CUTANEOUS LIP
LOCATION DETAILED: RIGHT LATERAL FOREHEAD
LOCATION DETAILED: LEFT LATERAL FOREHEAD
LOCATION DETAILED: RIGHT INFERIOR MEDIAL UPPER BACK
LOCATION DETAILED: LEFT CENTRAL TEMPLE
LOCATION DETAILED: LEFT SUPERIOR OCCIPITAL SCALP

## 2021-10-26 ASSESSMENT — LOCATION SIMPLE DESCRIPTION DERM
LOCATION SIMPLE: RIGHT LIP
LOCATION SIMPLE: LEFT HAND
LOCATION SIMPLE: LEFT OCCIPITAL SCALP
LOCATION SIMPLE: RIGHT ANKLE
LOCATION SIMPLE: LEFT ACHILLES SKIN
LOCATION SIMPLE: LEFT FOREHEAD
LOCATION SIMPLE: RIGHT FOREHEAD
LOCATION SIMPLE: ABDOMEN
LOCATION SIMPLE: SCALP
LOCATION SIMPLE: POSTERIOR NECK
LOCATION SIMPLE: LEFT FOREARM
LOCATION SIMPLE: RIGHT FOREARM
LOCATION SIMPLE: RIGHT UPPER BACK
LOCATION SIMPLE: LEFT TEMPLE
LOCATION SIMPLE: CHEST

## 2021-11-08 ENCOUNTER — HOSPITAL ENCOUNTER (OUTPATIENT)
Dept: GENERAL RADIOLOGY | Age: 76
Discharge: HOME | End: 2021-11-08

## 2021-11-08 DIAGNOSIS — M25.552 PELVIC JOINT PAIN, LEFT: ICD-10-CM

## 2021-11-23 ENCOUNTER — HOSPITAL ENCOUNTER (OUTPATIENT)
Dept: GENERAL RADIOLOGY | Age: 76
Discharge: HOME | End: 2021-11-23

## 2021-11-23 DIAGNOSIS — M54.42 ACUTE LEFT-SIDED LOW BACK PAIN WITH LEFT-SIDED SCIATICA: ICD-10-CM

## 2021-12-09 ENCOUNTER — HOSPITAL ENCOUNTER (OUTPATIENT)
Dept: PHYSICAL THERAPY | Age: 76
End: 2021-12-09
Attending: FAMILY MEDICINE
Payer: MEDICARE

## 2021-12-17 ENCOUNTER — HOSPITAL ENCOUNTER (OUTPATIENT)
Dept: PHYSICAL THERAPY | Age: 76
Discharge: HOME | End: 2021-12-17
Payer: MEDICARE

## 2021-12-17 PROCEDURE — 97162 PT EVAL MOD COMPLEX 30 MIN: CPT

## 2021-12-17 PROCEDURE — 97110 THERAPEUTIC EXERCISES: CPT

## 2021-12-17 NOTE — THERAPY EVALUATION
Saurabh Martin  : 1945  [place]  Secondary: [facility] at [address]  [phone]   [phone]        OUTPATIENT PHYSICAL THERAPY:Initial Assessment 2021   ICD-10: Treatment Diagnosis: low back pain M54.5, Stiffness of unspecified joint M25.60    Precautions/Allergies:   Amoxicillin   TREATMENT PLAN:  Effective Dates: 2021 TO 2022 (60 days). Frequency/Duration: 2 times a week for 60 Day(s) MEDICAL/REFERRING DIAGNOSIS:  Lumbago with sciatica, left side [M54.42]   DATE OF ONSET: 2 months ago  REFERRING PHYSICIAN: Nena Carlisle MD MD Orders: eval and treat  Return MD Appointment: 21     INITIAL ASSESSMENT:  Mr. Neumann Late presents with decreased joint mobility L1-4, hypertonic and tender L QL and lumbar paraspinals and relief with traction with decreases tolerance for standing and sitting. Pt would benefit from skilled therapy to address these deficits for return to previous level of function. PROBLEM LIST (Impacting functional limitations):  1. Decreased Strength  2. Decreased ADL/Functional Activities  3. Decreased Transfer Abilities  4. Decreased Ambulation Ability/Technique  5. Increased Pain  6. Decreased Flexibility/Joint Mobility  7. Decreased [place] with Home Exercise Program INTERVENTIONS PLANNED: (Treatment may consist of any combination of the following)  1. Cold  2. Electrical Stimulation  3. Heat  4. Home Exercise Program (HEP)  5. Manual Therapy  6. Range of Motion (ROM)  7. Therapeutic Exercise/Strengthening     GOALS: (Goals have been discussed and agreed upon with patient.)  Short-Term Functional Goals: Time Frame: 4 weeks  1. Pt will be I with HEP to allow for continued progress with symptom management. 2.   Discharge Goals: Time Frame: 8 weeks  1. Pt will improve DEISY score to <10 to reflect an improvement in function.    2. Pt will improve c/o pain to 3/10 to allow for improved tolerance for return to previous level of function. 3. Pt will improve TA strength to 5-/5 for 30 second hold to improve stability with functional activities. 4. Pt will report improved tolerance for sitting to 60 minutes. Commonly used abbreviations that may be included in this note:  STM- Soft tissue mobilization, R>L or L>R- right greater than left or vice versa, HEP - Home exercise program, CPA/UPA - Central or unilateral posterior-anterior mobilization, SLS- single leg stance, SKTC - Single leg to chest, SNAGS/NAGS- (sustained) Natural apophyseal glides, TKE- Terminal knee extension, ER- External rotation, IR- Internal rotation, B - Bilateral, sec- seconds, Lb- pounds, min - minutes, HA- Headache, OP- Over pressure, tband- theraband, fwd/bwd- Forward/Backward, TA- Transversus Abdominus, dbl- Double     OUTCOME MEASURE:   Tool Used: Modified Oswestry Low Back Pain Questionnaire  Score:  Initial: 11/50  Most Recent: X/50 (Date: -- )   Interpretation of Score: Each section is scored on a 0-5 scale, 5 representing the greatest disability. The scores of each section are added together for a total score of 50. MEDICAL NECESSITY:   · Patient is expected to demonstrate progress in strength and range of motion to return to previous level of function. REASON FOR SERVICES/OTHER COMMENTS:  · Patient continues to require present interventions due to patient's inability to prolonged sit. .  Regarding Saurabh Martin's therapy, I certify that the treatment plan above will be carried out by a therapist or under their direction. Thank you for this referral,  Cony Reece, PT     Referring Physician Signature: Olivia Anderson MD                  PAIN/SUBJECTIVE:   Initial: Pain Intensity 1:  (3-7/10)  Post Session:  5/10   HISTORY:   History of Injury/Illness (Reason for Referral):  Pt is a 77 y.o. male presenting to PT with c/o LBP.   States that a couple months ago he fell on his left side but had no problems immediately afterward. After a few weeks he started to have some back pain that worsened. The pain has improved recently but is still limiting. Prolonged sitting increases pain. Can sit 10-15 minutes. Pain is located on the left low back and wraps around toward his belly button. Prolonged standing can increase symptoms. Lying down helps symptoms. States that he lies down a lot at home. Past Medical History/Comorbidities:   Mr. Sara Bowen  has a past medical history of Allergic rhinitis, Arrhythmia, Arthritis of right knee (6/2/2020), Controlled type 2 diabetes mellitus without complication, without long-term current use of insulin ([address] (3/2/2017), GERD (gastroesophageal reflux disease), Hearing loss of both ears, Heel pain, chronic, right (9/7/2018), Hypercholesterolemia, Laceration of left hand without foreign body (12/3/2019), Obesity (BMI 30.0-34.9) (9/19/2017), Perforation of left tympanic membrane, SCC (squamous cell carcinoma) (1/6/2020), Sleep apnea, and Vertigo, benign paroxysmal.    He has no past medical history of Adverse effect of anesthesia, Aneurysm ([address] Asthma, Autoimmune disease ([address] CAD (coronary artery disease), Chronic kidney disease, Chronic obstructive pulmonary disease ([address] Coagulation disorder ([address] Difficult intubation, Heart failure ([address] Hypertension, Ill-defined condition, Liver disease, Malignant hyperthermia due to anesthesia, Nausea & vomiting, Pseudocholinesterase deficiency, Psychiatric disorder, PUD (peptic ulcer disease), Seizures ([address] Stroke ([address] Thromboembolus ([address] or Thyroid disease. Mr. Sara Bowen  has a past surgical history that includes hx orthopaedic (Left, 2005); hx heent; and hx colonoscopy.   Social History/Living Environment:     lives with family  Prior Level of Function/Work/Activity:  sedentary     Ambulatory/Rehab Services H2 Model Falls Risk Assessment   Risk Factors:       (1)  Gender [Male]       (5) History of Recent Falls [w/in 3 months] Ability to Rise from Chair:       (0)  Ability to rise in a single movement   Falls Prevention Plan:       No modifications necessary   Total: (5 or greater = High Risk): 6   ©2010 [facility] [employer] States Patent #[id number]. Federal Law prohibits the replication, distribution or use without written permission from [facility] [employer]   Current Medications:       Current Outpatient Medications:     rosuvastatin (CRESTOR) 10 mg tablet, TAKE 1 TABLET BY MOUTH EVERY DAY, Disp: 90 Tablet, Rfl: 1    predniSONE (DELTASONE) 20 mg tablet, Take 40 mg by mouth daily (with breakfast). , Disp: 10 Tablet, Rfl: 0    apixaban (ELIQUIS) 5 mg tablet, Take 1 Tablet by mouth every twelve (12) hours. , Disp: 180 Tablet, Rfl: 3    flecainide (TAMBOCOR) 100 mg tablet, TAKE 1 TABLET BY MOUTH TWICE A DAY, Disp: 180 Tablet, Rfl: 3    diclofenac (VOLTAREN) 1 % gel, Apply  to affected area four (4) times daily. , Disp: 100 g, Rfl: 1    metoprolol succinate (TOPROL-XL) 25 mg XL tablet, Take 0.5 Tabs by mouth daily. , Disp: 90 Tab, Rfl: 1    flaxseed oil 1,000 mg cap, Take 1 Cap by mouth daily. , Disp: , Rfl:     DOCOSAHEXANOIC ACID/EPA (FISH OIL PO), Take 1 Cap by mouth daily. , Disp: , Rfl:     aspirin delayed-release 81 mg tablet, Take 81 mg by mouth daily.  Pt states stopped aspirin per surgeon's request., Disp: , Rfl:    Date Last Reviewed:  12/17/2021    Number of Personal Factors/Comorbidities that affect the Plan of Care: 1-2: MODERATE COMPLEXITY   EXAMINATION:   Observation/Orthostatic Postural Assessment:          Posture:  Slightly R lumbar shift in standing  Hamstrings extensibility-  WNL  Hip flexor extensibility-  Mildly restricted bilaterally  Palpation:          Hypertonicity in L lumbar paraspinal at ischial crest and L QL  Joint mobility-  Decreased mobility L1-4, hypermobile and tender L4-5, Tender L1  ROM:            LUMBAR SPINE    AROM Flexion   Extension  Right Rotation  Left Rotation  Right Sidebend  Left Sidebend 80 %  50 %  80 %  80 % pain  80 %  80 %      Hip ER:  L-  50 degrees, R-  50 degrees  Hip IR:  L - 20 degrees, R- 20 degrees  Strength:          LE-  5-/5 globally  TA-  4/5  Special Tests:         SLR-  negative  SIJ CPR-  negative  Functional Mobility:         Bed Mobility:  Difficult with mild c/o pain       Body Structures Involved:  1. Nerves  2. Joints  3. Muscles Body Functions Affected:  1. Sensory/Pain  2. Neuromusculoskeletal  3. Movement Related Activities and Participation Affected:  1. General Tasks and Demands  2.  Mobility   Number of elements (examined above) that affect the Plan of Care: 4+: HIGH COMPLEXITY   CLINICAL PRESENTATION:   Presentation: Evolving clinical presentation with changing clinical characteristics: MODERATE COMPLEXITY   CLINICAL DECISION MAKING:   Use of outcome tool(s) and clinical judgement create a POC that gives a: Questionable prediction of patient's progress: MODERATE COMPLEXITY

## 2021-12-17 NOTE — PROGRESS NOTES
Saurabh Martin  : 1945  [place]  Secondary: Jaguar Thomas [address] at [address], [address] [place] [address]  [phone]   [phone]        OUTPATIENT PHYSICAL THERAPY: Daily Treatment Note 2021  Visit Count:  1    ICD-10: Treatment Diagnosis: low back pain M54.5, Stiffness of unspecified joint M25.60     Precautions/Allergies:   Amoxicillin   TREATMENT PLAN:  Effective Dates: 2021 TO 2022 (60 days). Frequency/Duration: 2 times a week for 60 Day(s)    Pre-treatment Symptoms/Complaints:  Left sided Low back pain  Pain: Initial: Pain Intensity 1:  (3-7/10)  Post Session:  5/10   Medications Last Reviewed:  2021  Updated Objective Findings:  See evaluation note from today  TREATMENT:     THERAPEUTIC EXERCISE: (18 minutes):  Exercises per grid below to improve mobility and strength. Required moderate visual, verbal, manual and tactile cues to promote proper body alignment, promote proper body posture and promote proper body mechanics. Progressed resistance, range, repetitions and complexity of movement as indicated.    Date:  21 Date:   Date:     Activity/Exercise Parameters Parameters Parameters   Lumbar rotation supine 20 reps     Pelvic tilt 20 reps     QL and lumbar paraspinal release with ball 5 min                               Commonly used abbreviations that may be included in this note:  STM- Soft tissue mobilization, R>L or L>R- right greater than left or vice versa, HEP - Home exercise program, CPA/UPA - Central or unilateral posterior-anterior mobilization, SLS- single leg stance, SKTC - Single leg to chest, SNAGS/NAGS- (sustained) Natural apophyseal glides, TKE- Terminal knee extension, ER- External rotation, IR- Internal rotation, B - Bilateral, sec- seconds, Lb- pounds, min - minutes, HA- Headache, OP- Over pressure, tband- theraband, fwd/bwd- Forward/Backward, TA- Transversus Abdominus, dbl- Double   MedBridge Portal  Treatment/Session Summary:    · Response to Treatment:  Pt would benefit from skilled therapy to address the aforementioned deficits that limit functional ability to return to previous level of function. · Communication/Consultation:  eval sent to referring physician  · Equipment provided today:  HEP  · Recommendations/Intent for next treatment session: Next visit will focus on progressing strength and ROM.     Total Treatment Billable Duration:  18 minutes (plus un-timed Evaluation charge)     PT Patient Time In/Time Out  Time In: 0930  Time Out: Isela Morales 39, PT    Future Appointments   Date Time Provider Aleksandra De La Fuente   12/21/2021 10:40 AM Adama Eagle MD [facility] PRE PRE   1/5/2022  7:30 AM Duane Jones, PT [facility]   1/11/2022  8:30 AM Duane Jones, PT [facility]   1/13/2022 11:30 AM Dariana Kelsey, PT [facility]   1/18/2022 10:30 AM Duane Jones, PT [facility]   1/20/2022 10:30 AM Duane Jones, PT [facility]   1/25/2022 10:30 AM Arveverette Jones, PT [facility]   1/27/2022 10:30 AM Duane Jones, PT [facility]   2/2/2022  9:00 AM Jacque Delcid MD [facility]   10/3/2022  9:15 AM Ilan Watkins MD [address] [place] [address]

## 2021-12-21 PROBLEM — U09.9 POST-COVID CHRONIC DYSPNEA: Status: ACTIVE | Noted: 2021-12-21

## 2021-12-21 PROBLEM — R06.09 POST-COVID CHRONIC DYSPNEA: Status: ACTIVE | Noted: 2021-12-21

## 2021-12-21 PROBLEM — K59.01 SLOW TRANSIT CONSTIPATION: Status: ACTIVE | Noted: 2021-12-21

## 2022-01-05 ENCOUNTER — HOSPITAL ENCOUNTER (OUTPATIENT)
Dept: PHYSICAL THERAPY | Age: 77
Discharge: HOME | End: 2022-01-05
Payer: MEDICARE

## 2022-01-05 PROCEDURE — 97110 THERAPEUTIC EXERCISES: CPT

## 2022-01-05 PROCEDURE — 97140 MANUAL THERAPY 1/> REGIONS: CPT

## 2022-01-05 NOTE — PROGRESS NOTES
Saurabh Martin  : 1945  [place]  Secondary: Jaguar Thomas [address] at [address], [address] [place] [address]  [phone]   [phone]        OUTPATIENT PHYSICAL THERAPY: Daily Treatment Note 2022  Visit Count:  2    ICD-10: Treatment Diagnosis: low back pain M54.5, Stiffness of unspecified joint M25.60     Precautions/Allergies:   Amoxicillin   TREATMENT PLAN:  Effective Dates: 2021 TO 2022 (60 days). Frequency/Duration: 2 times a week for 60 Day(s)    Pre-treatment Symptoms/Complaints:  Left sided Low back pain- Pt reports that he no longer has sharp pain and mostly feels an aching on his left side. Pain: Initial: Pain Intensity 1: 3  Post Session:  3/10   Medications Last Reviewed:  2022  Updated Objective Findings:  None Today  TREATMENT:     THERAPEUTIC EXERCISE: (15 minutes):  Exercises per grid below to improve mobility and strength. Required moderate visual, verbal, manual and tactile cues to promote proper body alignment, promote proper body posture and promote proper body mechanics. Progressed resistance, range, repetitions and complexity of movement as indicated. Date:  21 Date:  21 Date:     Activity/Exercise Parameters Parameters Parameters   Lumbar rotation supine 20 reps 20 reps    Pelvic tilt 20 reps     QL and lumbar paraspinal release with ball 5 min Reviewed    Nustep  4 min level 1.5    bridge  20 reps    Pull down with TA  20 reps green    Hip ER stretch  3x30 sec hold    Posterior hip stretch  3x30 sec hold    Lumbar distraction standing at bar  3 min                            MANUAL THERAPY (40 minutes)  Hip ER manual stretch, long axis distraction L LE, To promote tissue length and joint mobility for return to previous level of function.     Commonly used abbreviations that may be included in this note:  STM- Soft tissue mobilization, R>L or L>R- right greater than left or vice versa, HEP - Home exercise program, CPA/UPA - Central or unilateral posterior-anterior mobilization, SLS- single leg stance, SKTC - Single leg to chest, SNAGS/NAGS- (sustained) Natural apophyseal glides, TKE- Terminal knee extension, ER- External rotation, IR- Internal rotation, B - Bilateral, sec- seconds, Lb- pounds, min - minutes, HA- Headache, OP- Over pressure, tband- theraband, fwd/bwd- Forward/Backward, TA- Transversus Abdominus, dbl- Double   MedBridge Portal  Treatment/Session Summary:    · Response to Treatment:  Pt with good early response to therapy and carryover with HEP. Updated HEP with hip stretch and bridge   · Communication/Consultation:  None today  · Equipment provided today:  HEP  · Recommendations/Intent for next treatment session: Next visit will focus on progressing strength and ROM.     Total Treatment Billable Duration:  55 minutes    PT Patient Time In/Time Out  Time In: 0730  Time Out: 0830  Elma Urbina, CANDACE    Future Appointments   Date Time Provider Aleksandra De La Fuente   1/11/2022  8:30 AM Shobha Kelsey, PT [facility]   1/13/2022 11:30 AM Shobha Kelsey, PT [facility]   1/18/2022 10:30 AM Tracey Merritt, PT [facility]   1/20/2022 10:30 AM Tracey Merritt, PT [facility]   1/25/2022 10:30 AM Tracey Merritt, PT [facility]   1/27/2022 10:30 AM Tracey Merritt, PT [facility]   2/1/2022  9:00 AM Arcelia Kerr MD [facility] PRE PRE   2/2/2022  9:00 AM Tyrese Hoffman MD [facility]   10/3/2022  9:15 AM Corinne Mead MD [address] [place] [address]

## 2022-01-11 ENCOUNTER — HOSPITAL ENCOUNTER (OUTPATIENT)
Dept: PHYSICAL THERAPY | Age: 77
Discharge: HOME | End: 2022-01-11
Payer: MEDICARE

## 2022-01-11 PROCEDURE — 97140 MANUAL THERAPY 1/> REGIONS: CPT

## 2022-01-11 PROCEDURE — 97110 THERAPEUTIC EXERCISES: CPT

## 2022-01-11 NOTE — PROGRESS NOTES
Saurabh Martin  : 1945  [place]  Secondary: Jaguar Mcginnis at [address]  [phone]   [phone]        OUTPATIENT PHYSICAL THERAPY: Daily Treatment Note 2022  Visit Count:  3    ICD-10: Treatment Diagnosis: low back pain M54.5, Stiffness of unspecified joint M25.60     Precautions/Allergies:   Amoxicillin   TREATMENT PLAN:  Effective Dates: 2021 TO 2022 (60 days). Frequency/Duration: 2 times a week for 60 Day(s)    Pre-treatment Symptoms/Complaints:  Left sided Low back pain- 2022 Pt reports that the soreness is getting a little better. Pain: Initial: Pain Intensity 1: 3  Post Session:  3/10   Medications Last Reviewed:  2022  Updated Objective Findings:  None Today  TREATMENT:     THERAPEUTIC EXERCISE: (25 minutes):  Exercises per grid below to improve mobility and strength. Required moderate visual, verbal, manual and tactile cues to promote proper body alignment, promote proper body posture and promote proper body mechanics. Progressed resistance, range, repetitions and complexity of movement as indicated.    Date:  21 Date:  21 Date:  22   Activity/Exercise Parameters Parameters Parameters   Lumbar rotation supine 20 reps 20 reps 20 reps   Pelvic tilt 20 reps     QL and lumbar paraspinal release with ball 5 min Reviewed    Nustep  4 min level 1.5 6 min level 2.0   bridge  20 reps 20 reps   Pull down with TA  20 reps green 20 reps green   Hip ER stretch  3x30 sec hold 3x30 sec hold   Posterior hip stretch  3x30 sec hold 3x30 sec hold   Lumbar distraction standing at bar  3 min    Isometric obliques   Green x 20 reps each   SLR with TA   20 reps each               MANUAL THERAPY (30 minutes)  Hip ER manual stretch, long axis distraction L LE, To promote tissue length and joint mobility for return to previous level of function. Commonly used abbreviations that may be included in this note:  STM- Soft tissue mobilization, R>L or L>R- right greater than left or vice versa, HEP - Home exercise program, CPA/UPA - Central or unilateral posterior-anterior mobilization, SLS- single leg stance, SKTC - Single leg to chest, SNAGS/NAGS- (sustained) Natural apophyseal glides, TKE- Terminal knee extension, ER- External rotation, IR- Internal rotation, B - Bilateral, sec- seconds, Lb- pounds, min - minutes, HA- Headache, OP- Over pressure, tband- theraband, fwd/bwd- Forward/Backward, TA- Transversus Abdominus, dbl- Double   MedBridge Portal  Treatment/Session Summary:    · Response to Treatment:  Pt is improving steadily with pain-free ROM but continues to display tenderness and hypertonicity in L paraspinals and glutes. · Communication/Consultation:  None today  · Equipment provided today:  HEP  · Recommendations/Intent for next treatment session: Next visit will focus on progressing strength and ROM.     Total Treatment Billable Duration:  55 minutes    PT Patient Time In/Time Out  Time In: 0830  Time Out: [id number] [address], PT    Future Appointments   Date Time Provider Aleksandra De La Fuente   1/13/2022 11:30 AM Kody Kelsey, PT [facility]   1/18/2022 10:30 AM Corrinne Richard, PT [facility]   1/20/2022 10:30 AM Corrinne Richard, PT [facility]   1/25/2022 10:30 AM Corrinne Richard, PT [facility]   1/27/2022 10:30 AM Corrinne Richard, PT [facility]   2/1/2022  9:00 AM Samantha Louise MD [facility] PRE PRE   2/2/2022  9:00 AM Joey Gomez MD [facility] UCD   10/3/2022  9:15 AM Cindy Cotto MD [facility] ENT

## 2022-01-13 ENCOUNTER — HOSPITAL ENCOUNTER (OUTPATIENT)
Dept: PHYSICAL THERAPY | Age: 77
Discharge: HOME | End: 2022-01-13
Payer: MEDICARE

## 2022-01-13 PROCEDURE — 97110 THERAPEUTIC EXERCISES: CPT

## 2022-01-13 PROCEDURE — 97140 MANUAL THERAPY 1/> REGIONS: CPT

## 2022-01-13 NOTE — PROGRESS NOTES
Saurabh Martin  : 1945  [place]  Secondary: Jaguar Mcginnis at [address]  [phone]   [phone]        OUTPATIENT PHYSICAL THERAPY: Daily Treatment Note 2022  Visit Count:  4    ICD-10: Treatment Diagnosis: low back pain M54.5, Stiffness of unspecified joint M25.60     Precautions/Allergies:   Amoxicillin   TREATMENT PLAN:  Effective Dates: 2021 TO 2022 (60 days). Frequency/Duration: 2 times a week for 60 Day(s)    Pre-treatment Symptoms/Complaints:  Left sided Low back pain- 2022 Pt reports that his back feels nearly all the way better with only a little soreness now and then. Pain: Initial: Pain Intensity 1: 2  Post Session:  2/10   Medications Last Reviewed:  2022  Updated Objective Findings:  None Today  TREATMENT:     THERAPEUTIC EXERCISE: (25 minutes):  Exercises per grid below to improve mobility and strength. Required moderate visual, verbal, manual and tactile cues to promote proper body alignment, promote proper body posture and promote proper body mechanics. Progressed resistance, range, repetitions and complexity of movement as indicated. Date:  2022   Activity/Exercise Parameters   Lumbar rotation supine 20 reps   Pelvic tilt    QL and lumbar paraspinal release with ball    Nustep 6 min level 2.0   bridge 20 reps   Pull down with TA 20 reps green   Hip ER stretch 3x30 sec hold   Posterior hip stretch 3x30 sec hold   Lumbar distraction standing at bar    Isometric obliques Green x 20 reps each   SLR with TA 20 reps each   HEP and D/C review 5 min   Horizontal ABD on wall with TA 20 reps green   MANUAL THERAPY (20 minutes)  Hip ER manual stretch, long axis distraction L LE, To promote tissue length and joint mobility for return to previous level of function.     Commonly used abbreviations that may be included in this note:  STM- Soft tissue mobilization, R>L or L>R- right greater than left or vice versa, HEP - Home exercise program, CPA/UPA - Central or unilateral posterior-anterior mobilization, SLS- single leg stance, SKTC - Single leg to chest, SNAGS/NAGS- (sustained) Natural apophyseal glides, TKE- Terminal knee extension, ER- External rotation, IR- Internal rotation, B - Bilateral, sec- seconds, Lb- pounds, min - minutes, HA- Headache, OP- Over pressure, tband- theraband, fwd/bwd- Forward/Backward, TA- Transversus Abdominus, dbl- Double   MedBridge Portal  Treatment/Session Summary:    · Response to Treatment:  Pt to be D/C to I HEP today. · Communication/Consultation:  None today  · Equipment provided today:  HEP  · Recommendations/Intent for next treatment session: Next visit will focus on progressing strength and ROM.     Total Treatment Billable Duration:  45 minutes    PT Patient Time In/Time Out  Time In: 1130  Time Out: [address], PT    Future Appointments   Date Time Provider Aleksandra De La Fuente   1/18/2022 10:30 AM Candie Kelsey, PT [facility]   1/20/2022 10:30 AM Candie Kelsey, PT [facility]   1/25/2022 10:30 AM Israel Mcintosh, PT [facility]   1/27/2022 10:30 AM Israel Mcintosh, PT [facility]   2/1/2022  9:00 AM Maryse Solis MD [facility] PRE PRE   2/2/2022  9:00 AM Kb Alexander MD [facility] UCD   10/3/2022  9:15 AM Sindy Diamond MD [address] [place] [address]

## 2022-01-13 NOTE — THERAPY DISCHARGE
Saurabh Martin  : 1945  [place]  Secondary: Jaguar Thomas [address] at [address]  [phone]   [phone]        OUTPATIENT PHYSICAL THERAPY:Discharge Summary 2022   ICD-10: Treatment Diagnosis: low back pain M54.5, Stiffness of unspecified joint M25.60    Precautions/Allergies:   Amoxicillin   TREATMENT PLAN:  Effective Dates: 2021 TO 2022 (60 days). Frequency/Duration: 2 times a week for 60 Day(s) MEDICAL/REFERRING DIAGNOSIS:  Lumbago with sciatica, left side [M54.42]   DATE OF ONSET: 2 months ago  REFERRING PHYSICIAN: Fidel Restrepo MD MD Orders: eval and treat  Return MD Appointment: 21     INITIAL ASSESSMENT:  Mr. Yousuf Fowler attended Visit Count:  4 since 21 with excellent progress in symptom management. Pt to be D/C to I HEP today. GOALS: (Goals have been discussed and agreed upon with patient.)  Short-Term Functional Goals: Time Frame: 4 weeks  1. Pt will be I with HEP to allow for continued progress with symptom management. MET  2. Discharge Goals: Time Frame: 8 weeks  1. Pt will improve DEISY score to <10 to reflect an improvement in function. MET  2. Pt will improve c/o pain to 3/10 to allow for improved tolerance for return to previous level of function. MET  3. Pt will improve TA strength to 5-/5 for 30 second hold to improve stability with functional activities. MET  4. Pt will report improved tolerance for sitting to 60 minutes.  MET (2 hours)    Commonly used abbreviations that may be included in this note:  STM- Soft tissue mobilization, R>L or L>R- right greater than left or vice versa, HEP - Home exercise program, CPA/UPA - Central or unilateral posterior-anterior mobilization, SLS- single leg stance, SKTC - Single leg to chest, SNAGS/NAGS- (sustained) Natural apophyseal glides, TKE- Terminal knee extension, ER- External rotation, IR- Internal rotation, B - Bilateral, sec- seconds, Lb- pounds, min - minutes, HA- Headache, OP- Over pressure, tband- theraband, fwd/bwd- Forward/Backward, TA- Transversus Abdominus, dbl- Double     OUTCOME MEASURE:   Tool Used: Modified Oswestry Low Back Pain Questionnaire  Score:  Initial: 11/50  Most Recent: 2/50 (Date: 01/13/22 )   Interpretation of Score: Each section is scored on a 0-5 scale, 5 representing the greatest disability. The scores of each section are added together for a total score of 50. Regarding Saurabh Martin's therapy, I certify that the treatment plan above will be carried out by a therapist or under their direction. Thank you for this referral,  Pablito Machuca PT     Referring Physician Signature: Alen Murphy MD                  PAIN/SUBJECTIVE:   Initial: Pain Intensity 1: 2  Post Session:  2/10   HISTORY:   History of Injury/Illness (Reason for Referral):  Pt is a 77 y.o. male presenting to PT with c/o LBP. States that a couple months ago he fell on his left side but had no problems immediately afterward. After a few weeks he started to have some back pain that worsened. The pain has improved recently but is still limiting. Prolonged sitting increases pain. Can sit 10-15 minutes. Pain is located on the left low back and wraps around toward his belly button. Prolonged standing can increase symptoms. Lying down helps symptoms. States that he lies down a lot at home.    Past Medical History/Comorbidities:   Mr. Yosi Talley  has a past medical history of Allergic rhinitis, Arrhythmia, Arthritis of right knee (6/2/2020), Controlled type 2 diabetes mellitus without complication, without long-term current use of insulin ([facility] (3/2/2017), GERD (gastroesophageal reflux disease), Hearing loss of both ears, Heel pain, chronic, right (9/7/2018), Hypercholesterolemia, Laceration of left hand without foreign body (12/3/2019), Obesity (BMI 30.0-34.9) (9/19/2017), Perforation of left tympanic membrane, SCC (squamous cell carcinoma) (1/6/2020), Sleep apnea, and Vertigo, benign paroxysmal.    He has no past medical history of Adverse effect of anesthesia, Aneurysm ([address] Asthma, Autoimmune disease ([address] CAD (coronary artery disease), Chronic kidney disease, Chronic obstructive pulmonary disease ([address] Coagulation disorder ([address] Difficult intubation, Heart failure ([address] Hypertension, Ill-defined condition, Liver disease, Malignant hyperthermia due to anesthesia, Nausea & vomiting, Pseudocholinesterase deficiency, Psychiatric disorder, PUD (peptic ulcer disease), Seizures ([address] Stroke ([address] Thromboembolus ([address] or Thyroid disease. Mr. Kinjal Lopes  has a past surgical history that includes hx orthopaedic (Left, 2005); hx heent; and hx colonoscopy. Social History/Living Environment:     lives with family  Prior Level of Function/Work/Activity:  sedentary     Ambulatory/Rehab Services H2 Model Falls Risk Assessment   Risk Factors:       (1)  Gender [Male]       (5)  History of Recent Falls [w/in 3 months] Ability to Rise from Chair:       (0)  Ability to rise in a single movement   Falls Prevention Plan:       No modifications necessary   Total: (5 or greater = High Risk): 6   ©2010 [facility] [address] [place] [address] Patent #[id number]. Federal Law prohibits the replication, distribution or use without written permission from [facility] [email or web address]   Current Medications:       Current Outpatient Medications:     predniSONE (STERAPRED DS) 10 mg dose pack, Take 1 Tablet by mouth See Admin Instructions. See administration instruction per 10mg dose pack, Disp: 21 Tablet, Rfl: 0    rosuvastatin (CRESTOR) 10 mg tablet, TAKE 1 TABLET BY MOUTH EVERY DAY, Disp: 90 Tablet, Rfl: 1    apixaban (ELIQUIS) 5 mg tablet, Take 1 Tablet by mouth every twelve (12) hours. , Disp: 180 Tablet, Rfl: 3    flecainide (TAMBOCOR) 100 mg tablet, TAKE 1 TABLET BY MOUTH TWICE A DAY, Disp: 180 Tablet, Rfl: 3   diclofenac (VOLTAREN) 1 % gel, Apply  to affected area four (4) times daily. , Disp: 100 g, Rfl: 1    metoprolol succinate (TOPROL-XL) 25 mg XL tablet, Take 0.5 Tabs by mouth daily. , Disp: 90 Tab, Rfl: 1    flaxseed oil 1,000 mg cap, Take 1 Cap by mouth daily. , Disp: , Rfl:     DOCOSAHEXANOIC ACID/EPA (FISH OIL PO), Take 1 Cap by mouth daily. , Disp: , Rfl:     aspirin delayed-release 81 mg tablet, Take 81 mg by mouth daily.  Pt states stopped aspirin per surgeon's request., Disp: , Rfl:    Date Last Reviewed:  1/13/2022    Number of Personal Factors/Comorbidities that affect the Plan of Care: 1-2: MODERATE COMPLEXITY   EXAMINATION:   Observation/Orthostatic Postural Assessment:          Posture:  unremarkable  Hamstrings extensibility-  WNL  Hip flexor extensibility-  Mildly restricted bilaterally  Palpation:          Hypertonicity in L lumbar paraspinal at ischial crest and L QL  Joint mobility-  Decreased mobility L1-4, hypermobile and tender L4-5, mildly Tender L1  ROM:            LUMBAR SPINE    AROM    Flexion   Extension  Right Rotation  Left Rotation  Right Sidebend  Left Sidebend 80 %  60 %  80 %  80 %   80 %  80 %      Hip ER:  L-  50 degrees, R-  50 degrees  Hip IR:  L - 5 degrees, R- 25 degrees  Strength:          LE-  5-/5 globally  TA-  5-/5 for 60 seconds  Special Tests:         SLR-  negative  SIJ CPR-  negative  Functional Mobility:         Bed Mobility:  No difficulty or c/o pain

## 2022-01-18 ENCOUNTER — APPOINTMENT (OUTPATIENT)
Dept: PHYSICAL THERAPY | Age: 77
End: 2022-01-18
Payer: MEDICARE

## 2022-01-20 ENCOUNTER — APPOINTMENT (OUTPATIENT)
Dept: PHYSICAL THERAPY | Age: 77
End: 2022-01-20
Payer: MEDICARE

## 2022-01-25 ENCOUNTER — APPOINTMENT (OUTPATIENT)
Dept: PHYSICAL THERAPY | Age: 77
End: 2022-01-25
Payer: MEDICARE

## 2022-01-27 ENCOUNTER — APPOINTMENT (OUTPATIENT)
Dept: PHYSICAL THERAPY | Age: 77
End: 2022-01-27
Payer: MEDICARE

## 2022-02-01 PROBLEM — I10 PRIMARY HYPERTENSION: Status: ACTIVE | Noted: 2022-02-01

## 2022-03-18 PROBLEM — M54.42 ACUTE LEFT-SIDED LOW BACK PAIN WITH LEFT-SIDED SCIATICA: Status: ACTIVE | Noted: 2017-02-07

## 2022-03-18 PROBLEM — M17.11 ARTHRITIS OF RIGHT KNEE: Status: ACTIVE | Noted: 2020-06-02

## 2022-03-18 PROBLEM — N52.9 VASCULOGENIC ERECTILE DYSFUNCTION: Status: ACTIVE | Noted: 2021-05-24

## 2022-03-18 PROBLEM — Z98.890 HISTORY OF TYMPANOPLASTY OF LEFT EAR: Status: ACTIVE | Noted: 2017-02-06

## 2022-03-18 PROBLEM — H65.193 ACUTE MUCOID OTITIS MEDIA OF BOTH EARS: Status: ACTIVE | Noted: 2019-10-25

## 2022-03-18 PROBLEM — M25.562 PAIN IN LATERAL PORTION OF LEFT KNEE: Status: ACTIVE | Noted: 2018-03-19

## 2022-03-18 PROBLEM — N40.0 BENIGN PROSTATIC HYPERPLASIA WITHOUT LOWER URINARY TRACT SYMPTOMS: Status: ACTIVE | Noted: 2017-12-19

## 2022-03-18 PROBLEM — E66.811 OBESITY (BMI 30.0-34.9): Status: ACTIVE | Noted: 2017-09-19

## 2022-03-18 PROBLEM — C44.310 BASAL CELL CARCINOMA (BCC) OF SKIN OF FACE: Status: ACTIVE | Noted: 2020-01-06

## 2022-03-18 PROBLEM — H65.113 ACUTE MUCOID OTITIS MEDIA OF BOTH EARS: Status: ACTIVE | Noted: 2019-10-25

## 2022-03-18 PROBLEM — E66.9 OBESITY (BMI 30.0-34.9): Status: ACTIVE | Noted: 2017-09-19

## 2022-03-18 PROBLEM — R00.2 HEART PALPITATIONS: Status: ACTIVE | Noted: 2017-06-13

## 2022-03-18 PROBLEM — K59.01 SLOW TRANSIT CONSTIPATION: Status: ACTIVE | Noted: 2021-12-21

## 2022-03-19 PROBLEM — K11.21 PAROTITIS, ACUTE: Status: ACTIVE | Noted: 2018-09-07

## 2022-03-19 PROBLEM — H69.83 DYSFUNCTION OF BOTH EUSTACHIAN TUBES: Status: ACTIVE | Noted: 2018-10-29

## 2022-03-19 PROBLEM — H66.004 RECURRENT ACUTE SUPPURATIVE OTITIS MEDIA OF RIGHT EAR WITHOUT SPONTANEOUS RUPTURE OF TYMPANIC MEMBRANE: Status: ACTIVE | Noted: 2020-06-02

## 2022-03-19 PROBLEM — Z11.59 ENCOUNTER FOR HEPATITIS C SCREENING TEST FOR LOW RISK PATIENT: Status: ACTIVE | Noted: 2021-07-15

## 2022-03-19 PROBLEM — H69.93 DISORDER OF BOTH EUSTACHIAN TUBES: Status: ACTIVE | Noted: 2018-10-29

## 2022-03-19 PROBLEM — K59.03 DRUG-INDUCED CONSTIPATION: Status: ACTIVE | Noted: 2018-12-31

## 2022-03-19 PROBLEM — M79.671 HEEL PAIN, CHRONIC, RIGHT: Status: ACTIVE | Noted: 2018-09-07

## 2022-03-19 PROBLEM — U09.9 POST-COVID CHRONIC DYSPNEA: Status: ACTIVE | Noted: 2021-12-21

## 2022-03-19 PROBLEM — M25.552 PELVIC JOINT PAIN, LEFT: Status: ACTIVE | Noted: 2021-11-08

## 2022-03-19 PROBLEM — I10 PRIMARY HYPERTENSION: Status: ACTIVE | Noted: 2022-02-01

## 2022-03-19 PROBLEM — R09.89 GLOBUS SENSATION: Status: ACTIVE | Noted: 2018-06-19

## 2022-03-19 PROBLEM — I48.0 PAF (PAROXYSMAL ATRIAL FIBRILLATION) (HCC): Status: ACTIVE | Noted: 2018-12-19

## 2022-03-19 PROBLEM — R06.09 POST-COVID CHRONIC DYSPNEA: Status: ACTIVE | Noted: 2021-12-21

## 2022-03-19 PROBLEM — R09.A2 GLOBUS SENSATION: Status: ACTIVE | Noted: 2018-06-19

## 2022-03-19 PROBLEM — C44.92 SCC (SQUAMOUS CELL CARCINOMA): Status: ACTIVE | Noted: 2020-01-06

## 2022-03-19 PROBLEM — Z23 ENCOUNTER FOR IMMUNIZATION: Status: ACTIVE | Noted: 2017-09-19

## 2022-03-19 PROBLEM — H69.93 DYSFUNCTION OF BOTH EUSTACHIAN TUBES: Status: ACTIVE | Noted: 2018-10-29

## 2022-03-19 PROBLEM — R42 VERTIGO: Status: ACTIVE | Noted: 2017-01-04

## 2022-03-19 PROBLEM — E86.0 DEHYDRATION: Status: ACTIVE | Noted: 2020-09-03

## 2022-03-19 PROBLEM — G89.29 HEEL PAIN, CHRONIC, RIGHT: Status: ACTIVE | Noted: 2018-09-07

## 2022-03-20 PROBLEM — J30.2 SEASONAL ALLERGIC RHINITIS: Status: ACTIVE | Noted: 2018-04-04

## 2022-03-20 PROBLEM — J30.9 ALLERGIC RHINITIS: Status: ACTIVE | Noted: 2018-10-29

## 2022-03-20 PROBLEM — Z13.39 SCREENING FOR ALCOHOLISM: Status: ACTIVE | Noted: 2017-03-13

## 2022-03-20 PROBLEM — E11.9 CONTROLLED TYPE 2 DIABETES MELLITUS WITHOUT COMPLICATION, WITHOUT LONG-TERM CURRENT USE OF INSULIN (HCC): Status: ACTIVE | Noted: 2017-03-02

## 2022-03-20 PROBLEM — Z13.31 SCREENING FOR DEPRESSION: Status: ACTIVE | Noted: 2021-07-15

## 2022-05-09 ENCOUNTER — NURSE TRIAGE (OUTPATIENT)
Dept: OTHER | Facility: CLINIC | Age: 77
End: 2022-05-09

## 2022-05-09 ENCOUNTER — APPOINTMENT (RX ONLY)
Dept: URBAN - METROPOLITAN AREA CLINIC 23 | Facility: CLINIC | Age: 77
Setting detail: DERMATOLOGY
End: 2022-05-09

## 2022-05-09 DIAGNOSIS — L57.8 OTHER SKIN CHANGES DUE TO CHRONIC EXPOSURE TO NONIONIZING RADIATION: ICD-10-CM

## 2022-05-09 DIAGNOSIS — L57.0 ACTINIC KERATOSIS: ICD-10-CM

## 2022-05-09 DIAGNOSIS — Z85.828 PERSONAL HISTORY OF OTHER MALIGNANT NEOPLASM OF SKIN: ICD-10-CM

## 2022-05-09 DIAGNOSIS — D22 MELANOCYTIC NEVI: ICD-10-CM

## 2022-05-09 DIAGNOSIS — L82.1 OTHER SEBORRHEIC KERATOSIS: ICD-10-CM

## 2022-05-09 DIAGNOSIS — D18.0 HEMANGIOMA: ICD-10-CM

## 2022-05-09 PROBLEM — E78.5 HYPERLIPIDEMIA, UNSPECIFIED: Status: ACTIVE | Noted: 2022-05-09

## 2022-05-09 PROBLEM — D22.0 MELANOCYTIC NEVI OF LIP: Status: ACTIVE | Noted: 2022-05-09

## 2022-05-09 PROBLEM — J30.1 ALLERGIC RHINITIS DUE TO POLLEN: Status: ACTIVE | Noted: 2022-05-09

## 2022-05-09 PROBLEM — D18.01 HEMANGIOMA OF SKIN AND SUBCUTANEOUS TISSUE: Status: ACTIVE | Noted: 2022-05-09

## 2022-05-09 PROCEDURE — ? PRESCRIPTION

## 2022-05-09 PROCEDURE — 17000 DESTRUCT PREMALG LESION: CPT

## 2022-05-09 PROCEDURE — 17003 DESTRUCT PREMALG LES 2-14: CPT

## 2022-05-09 PROCEDURE — ? COUNSELING

## 2022-05-09 PROCEDURE — 99214 OFFICE O/P EST MOD 30 MIN: CPT | Mod: 25

## 2022-05-09 PROCEDURE — ? LIQUID NITROGEN

## 2022-05-09 RX ORDER — FLUOROURACIL 5 MG/G
CREAM TOPICAL
Qty: 40 | Refills: 0 | Status: ERX | COMMUNITY
Start: 2022-05-09

## 2022-05-09 RX ADMIN — FLUOROURACIL: 5 CREAM TOPICAL at 00:00

## 2022-05-09 ASSESSMENT — LOCATION SIMPLE DESCRIPTION DERM
LOCATION SIMPLE: RIGHT CHEEK
LOCATION SIMPLE: LEFT ACHILLES SKIN
LOCATION SIMPLE: LEFT FOREHEAD
LOCATION SIMPLE: LEFT EAR
LOCATION SIMPLE: LEFT HAND
LOCATION SIMPLE: POSTERIOR NECK
LOCATION SIMPLE: LEFT FOREARM
LOCATION SIMPLE: CHEST
LOCATION SIMPLE: RIGHT FOREHEAD
LOCATION SIMPLE: ABDOMEN
LOCATION SIMPLE: RIGHT FOREARM
LOCATION SIMPLE: RIGHT UPPER BACK
LOCATION SIMPLE: LEFT CHEEK
LOCATION SIMPLE: LEFT TEMPLE
LOCATION SIMPLE: RIGHT LIP
LOCATION SIMPLE: RIGHT ANKLE
LOCATION SIMPLE: RIGHT EYEBROW

## 2022-05-09 ASSESSMENT — LOCATION DETAILED DESCRIPTION DERM
LOCATION DETAILED: RIGHT DISTAL DORSAL FOREARM
LOCATION DETAILED: RIGHT UPPER CUTANEOUS LIP
LOCATION DETAILED: LEFT ULNAR DORSAL HAND
LOCATION DETAILED: RIGHT LATERAL EYEBROW
LOCATION DETAILED: LEFT SUPERIOR HELIX
LOCATION DETAILED: LEFT DISTAL DORSAL FOREARM
LOCATION DETAILED: LEFT LATERAL INFERIOR CHEST
LOCATION DETAILED: RIGHT INFERIOR FOREHEAD
LOCATION DETAILED: LEFT INFERIOR FOREHEAD
LOCATION DETAILED: LEFT SUPERIOR LATERAL MALAR CHEEK
LOCATION DETAILED: LEFT CENTRAL MALAR CHEEK
LOCATION DETAILED: RIGHT INFERIOR MEDIAL UPPER BACK
LOCATION DETAILED: RIGHT SUPERIOR CENTRAL MALAR CHEEK
LOCATION DETAILED: RIGHT CENTRAL MALAR CHEEK
LOCATION DETAILED: LEFT SUPERIOR FOREHEAD
LOCATION DETAILED: RIGHT POSTERIOR LATERAL MALLEOLUS
LOCATION DETAILED: EPIGASTRIC SKIN
LOCATION DETAILED: RIGHT SUPERIOR MEDIAL MALAR CHEEK
LOCATION DETAILED: LEFT INFERIOR LATERAL FOREHEAD
LOCATION DETAILED: LEFT MID PREAURICULAR CHEEK
LOCATION DETAILED: LEFT ACHILLES SKIN
LOCATION DETAILED: LEFT FOREHEAD
LOCATION DETAILED: LEFT MEDIAL TEMPLE
LOCATION DETAILED: RIGHT MEDIAL TRAPEZIAL NECK

## 2022-05-09 ASSESSMENT — LOCATION ZONE DERM
LOCATION ZONE: FACE
LOCATION ZONE: NECK
LOCATION ZONE: LEG
LOCATION ZONE: LIP
LOCATION ZONE: ARM
LOCATION ZONE: EAR
LOCATION ZONE: TRUNK
LOCATION ZONE: HAND

## 2022-05-09 NOTE — PROCEDURE: LIQUID NITROGEN
Post-Care Instructions: I reviewed with the patient in detail post-care instructions. Patient is to wear sunprotection, and avoid picking at any of the treated lesions. Pt may apply Vaseline to crusted or scabbing areas.
Detail Level: Detailed
Show Aperture Variable?: Yes
Duration Of Freeze Thaw-Cycle (Seconds): 0
Render Note In Bullet Format When Appropriate: No
Consent: The patient's consent was obtained including but not limited to risks of crusting, scabbing, blistering, scarring, darker or lighter pigmentary change, recurrence, incomplete removal and infection.

## 2022-05-09 NOTE — TELEPHONE ENCOUNTER
Received call from [address] [place] [address] with Red Flag Complaint. Subjective: Caller states \"last few mornings I have had dizziness, lightheadedness, and a headache. Saturday we got up early and stayed busy all day and later that evening I was very tired and when I went to the restroom and dozed off for a second but I did not feel dizzy then or passed out I just fell asleep I think. Other than that I have not fallen or fainted. I can walk fine still it is just some dizziness that comes and goes. I don't have a headache today but I did yesterday. \"     BS yesterday 124    Current Medications: Eliquis, Tambocor    Current Symptoms: Reports dizziness, lightheadedness, intermittent headache not present now, fatigue, intermittent nausea, nasal congestion, Friday L eye was twitching while looking at computer with some blurry vision but cleared up after getting of of computer. Denies fever, body aches, chills, fainting, falls, N/V, reduced fluid or food intake, CP, unilateral weakness, palpitations, blood in stool, melena, neck stiffness, vertigo, loss of vision, unsteady gait, unilateral weakness/drooping of face. Onset: a few days ago; unchanged    Associated Symptoms: NA    Pain Severity: denies pain currently    Temperature: denies     What has been tried: Nasal spray    LMP: NA Pregnant: No    Recommended disposition: See in Office Today    Care advice provided, patient verbalizes understanding; denies any other questions or concerns; instructed to call back for any new or worsening symptoms. Patient/Caller agrees with recommended disposition; writer provided warm transfer to [address] [place] [address] for appointment scheduling    Attention Provider: Thank you for allowing me to participate in the care of your patient. The patient was connected to triage in response to information provided to the [facility].   Please do not respond through this encounter as the response is not directed to a shared pool.    Reason for Disposition   Patient wants to be seen    Protocols used: [id number]

## 2022-06-21 RX ORDER — ROSUVASTATIN CALCIUM 10 MG/1
10 TABLET, COATED ORAL DAILY
Qty: 90 TABLET | Refills: 0 | Status: SHIPPED | OUTPATIENT
Start: 2022-06-21 | End: 2022-06-22 | Stop reason: SDUPTHER

## 2022-06-22 RX ORDER — ROSUVASTATIN CALCIUM 10 MG/1
10 TABLET, COATED ORAL DAILY
Qty: 90 TABLET | Refills: 1 | Status: SHIPPED | OUTPATIENT
Start: 2022-06-22 | End: 2022-06-28 | Stop reason: SDUPTHER

## 2022-06-27 ENCOUNTER — TELEPHONE (OUTPATIENT)
Dept: FAMILY MEDICINE CLINIC | Facility: CLINIC | Age: 77
End: 2022-06-27

## 2022-06-27 NOTE — TELEPHONE ENCOUNTER
Needing a refill on the rosuvastatin sent to the [facility] on [address] in [facility], prescription was sent to [facility].

## 2022-06-28 ENCOUNTER — NURSE TRIAGE (OUTPATIENT)
Dept: OTHER | Facility: CLINIC | Age: 77
End: 2022-06-28

## 2022-06-28 ENCOUNTER — TELEMEDICINE (OUTPATIENT)
Dept: FAMILY MEDICINE CLINIC | Facility: CLINIC | Age: 77
End: 2022-06-28
Payer: MEDICARE

## 2022-06-28 DIAGNOSIS — R42 VERTIGO: ICD-10-CM

## 2022-06-28 DIAGNOSIS — I48.0 PAF (PAROXYSMAL ATRIAL FIBRILLATION) (HCC): ICD-10-CM

## 2022-06-28 DIAGNOSIS — H66.90 ACUTE OTITIS MEDIA, UNSPECIFIED OTITIS MEDIA TYPE: Primary | ICD-10-CM

## 2022-06-28 PROCEDURE — G8427 DOCREV CUR MEDS BY ELIG CLIN: HCPCS | Performed by: FAMILY MEDICINE

## 2022-06-28 PROCEDURE — 99214 OFFICE O/P EST MOD 30 MIN: CPT | Performed by: FAMILY MEDICINE

## 2022-06-28 PROCEDURE — 1123F ACP DISCUSS/DSCN MKR DOCD: CPT | Performed by: FAMILY MEDICINE

## 2022-06-28 RX ORDER — CEFDINIR 300 MG/1
300 CAPSULE ORAL 2 TIMES DAILY
Qty: 20 CAPSULE | Refills: 0 | Status: SHIPPED | OUTPATIENT
Start: 2022-06-28 | End: 2022-07-08

## 2022-06-28 RX ORDER — MECLIZINE HCL 12.5 MG/1
12.5 TABLET ORAL 3 TIMES DAILY PRN
Qty: 30 TABLET | Refills: 0 | Status: SHIPPED | OUTPATIENT
Start: 2022-06-28 | End: 2022-07-08

## 2022-06-28 RX ORDER — ROSUVASTATIN CALCIUM 10 MG/1
10 TABLET, COATED ORAL DAILY
Qty: 90 TABLET | Refills: 1 | Status: SHIPPED | OUTPATIENT
Start: 2022-06-28

## 2022-06-28 ASSESSMENT — PATIENT HEALTH QUESTIONNAIRE - PHQ9
2. FEELING DOWN, DEPRESSED OR HOPELESS: 0
SUM OF ALL RESPONSES TO PHQ9 QUESTIONS 1 & 2: 0
SUM OF ALL RESPONSES TO PHQ QUESTIONS 1-9: 0
1. LITTLE INTEREST OR PLEASURE IN DOING THINGS: 0

## 2022-06-28 ASSESSMENT — ENCOUNTER SYMPTOMS
VOMITING: 1
NAUSEA: 1

## 2022-06-28 NOTE — PROGRESS NOTES
Observations: No, remote/electronic monitoring device was not used or able to be verified  Patient-Reported Pulse: 64  Patient-Reported Temperature: 96.0  Patient-Reported Weight: 229 lb  Patient-Reported Height: 70\"       Physical Exam  [INSTRUCTIONS:  \"[x]\" Indicates a positive item  \"[]\" Indicates a negative item  -- DELETE ALL ITEMS NOT EXAMINED]    Constitutional: [x] Appears well-developed and well-nourished [x] No apparent distress      [] Abnormal -     Mental status: [x] Alert and awake  [x] Oriented to person/place/time [x] Able to follow commands    [] Abnormal -     Eyes:   EOM    [x]  Normal    [] Abnormal -   Sclera  [x]  Normal    [] Abnormal -          Discharge [x]  None visible   [] Abnormal -     HENT: [x] Normocephalic, atraumatic  [] Abnormal -   [x] Mouth/Throat: Mucous membranes are moist    External Ears [x] Normal  [] Abnormal -    Neck: [x] No visualized mass [] Abnormal -     Pulmonary/Chest: [x] Respiratory effort normal   [x] No visualized signs of difficulty breathing or respiratory distress        [] Abnormal -      Musculoskeletal:   [x] Normal gait with no signs of ataxia         [x] Normal range of motion of neck        [] Abnormal -     Neurological:        [x] No Facial Asymmetry (Cranial nerve 7 motor function) (limited exam due to video visit)          [x] No gaze palsy        [x] Abnormal -  Prefers to have his eyes closed due to his dizziness      Skin:        [x] No significant exanthematous lesions or discoloration noted on facial skin         [] Abnormal -            Psychiatric:       [x] Normal Affect [] Abnormal -        [x] No Hallucinations    Other pertinent observable physical exam findings:-                 Hector Campbell, was evaluated through a synchronous (real-time) audio-video encounter. The patient (or guardian if applicable) is aware that this is a billable service, which includes applicable co-pays.  This Virtual Visit was conducted with patient's (and/or legal guardian's) consent. The visit was conducted pursuant to the emergency declaration under the [facility] [address] authority and the Lauri S[employer] and [employer] Act. Patient identification was verified, and a caregiver was present when appropriate. The patient was located at Home: [address] [place] [id number]. Provider was located at [facility] (Appt Dept): [id number] [address].         --Sintia Byers MD

## 2022-06-28 NOTE — TELEPHONE ENCOUNTER
Received call from [facility] at [facility] with [employer] Complaint. Subjective: Caller states \"Having dizziness that started yesterday with vomiting. When standing up will get dizzy and nauseous. When I walk I walk sideways and my right ear will bother me. Dizziness goes away when I lay down. \"     Current Symptoms: Dizziness, vomiting, 130 blood sugar    Onset: 1 day ago;     Associated Symptoms: NA    Pain Severity: 0/10; N/A; none    Temperature:  Denies Fever    What has been tried: laying down    LMP: NA Pregnant: NA    Recommended disposition: Go to Office Now    Care advice provided, patient verbalizes understanding; denies any other questions or concerns; instructed to call back for any new or worsening symptoms. Patient/Caller agrees with recommended disposition; writer provided warm transfer to Ashley Harding at [facility] for appointment scheduling     Attention Provider: Thank you for allowing me to participate in the care of your patient. The patient was connected to triage in response to information provided to the ECC/PSC. Please do not respond through this encounter as the response is not directed to a shared pool.           Reason for Disposition   Lightheadedness (dizziness) present now, after 2 hours of rest and fluids    Protocols used: DIZZINESS-ADULT-[place]

## 2022-08-01 ENCOUNTER — OFFICE VISIT (OUTPATIENT)
Dept: FAMILY MEDICINE CLINIC | Facility: CLINIC | Age: 77
End: 2022-08-01
Payer: MEDICARE

## 2022-08-01 VITALS
SYSTOLIC BLOOD PRESSURE: 138 MMHG | WEIGHT: 224 LBS | HEIGHT: 70 IN | DIASTOLIC BLOOD PRESSURE: 80 MMHG | BODY MASS INDEX: 32.07 KG/M2

## 2022-08-01 DIAGNOSIS — I10 PRIMARY HYPERTENSION: ICD-10-CM

## 2022-08-01 DIAGNOSIS — I48.0 PAF (PAROXYSMAL ATRIAL FIBRILLATION) (HCC): Primary | ICD-10-CM

## 2022-08-01 DIAGNOSIS — I48.0 PAF (PAROXYSMAL ATRIAL FIBRILLATION) (HCC): ICD-10-CM

## 2022-08-01 DIAGNOSIS — R42 VERTIGO: ICD-10-CM

## 2022-08-01 DIAGNOSIS — E11.9 CONTROLLED TYPE 2 DIABETES MELLITUS WITHOUT COMPLICATION, WITHOUT LONG-TERM CURRENT USE OF INSULIN (HCC): ICD-10-CM

## 2022-08-01 DIAGNOSIS — Z00.00 ENCOUNTER FOR ANNUAL WELLNESS EXAM IN MEDICARE PATIENT: ICD-10-CM

## 2022-08-01 LAB
ALBUMIN SERPL-MCNC: 3.8 G/DL (ref 3.2–4.6)
ALBUMIN/GLOB SERPL: 1.3 {RATIO} (ref 1.2–3.5)
ALP SERPL-CCNC: 90 U/L (ref 50–136)
ALT SERPL-CCNC: 24 U/L (ref 12–65)
ANION GAP SERPL CALC-SCNC: 5 MMOL/L (ref 7–16)
AST SERPL-CCNC: 15 U/L (ref 15–37)
BASOPHILS # BLD: 0.1 K/UL (ref 0–0.2)
BASOPHILS NFR BLD: 1 % (ref 0–2)
BILIRUB SERPL-MCNC: 0.6 MG/DL (ref 0.2–1.1)
BUN SERPL-MCNC: 9 MG/DL (ref 8–23)
CALCIUM SERPL-MCNC: 8.6 MG/DL (ref 8.3–10.4)
CHLORIDE SERPL-SCNC: 109 MMOL/L (ref 98–107)
CHOLEST SERPL-MCNC: 122 MG/DL
CO2 SERPL-SCNC: 26 MMOL/L (ref 21–32)
CREAT SERPL-MCNC: 1.1 MG/DL (ref 0.8–1.5)
DIFFERENTIAL METHOD BLD: NORMAL
EOSINOPHIL # BLD: 0.2 K/UL (ref 0–0.8)
EOSINOPHIL NFR BLD: 4 % (ref 0.5–7.8)
ERYTHROCYTE [DISTWIDTH] IN BLOOD BY AUTOMATED COUNT: 13.6 % (ref 11.9–14.6)
EST. AVERAGE GLUCOSE BLD GHB EST-MCNC: 126 MG/DL
GLOBULIN SER CALC-MCNC: 2.9 G/DL (ref 2.3–3.5)
GLUCOSE SERPL-MCNC: 130 MG/DL (ref 65–100)
HBA1C MFR BLD: 6 % (ref 4.8–5.6)
HCT VFR BLD AUTO: 45 % (ref 41.1–50.3)
HDLC SERPL-MCNC: 37 MG/DL (ref 40–60)
HDLC SERPL: 3.3 {RATIO}
HGB BLD-MCNC: 14.5 G/DL (ref 13.6–17.2)
IMM GRANULOCYTES # BLD AUTO: 0 K/UL (ref 0–0.5)
IMM GRANULOCYTES NFR BLD AUTO: 0 % (ref 0–5)
LDLC SERPL CALC-MCNC: 60 MG/DL
LYMPHOCYTES # BLD: 1 K/UL (ref 0.5–4.6)
LYMPHOCYTES NFR BLD: 20 % (ref 13–44)
MCH RBC QN AUTO: 30 PG (ref 26.1–32.9)
MCHC RBC AUTO-ENTMCNC: 32.2 G/DL (ref 31.4–35)
MCV RBC AUTO: 93.2 FL (ref 79.6–97.8)
MONOCYTES # BLD: 0.3 K/UL (ref 0.1–1.3)
MONOCYTES NFR BLD: 7 % (ref 4–12)
NEUTS SEG # BLD: 3.5 K/UL (ref 1.7–8.2)
NEUTS SEG NFR BLD: 68 % (ref 43–78)
NRBC # BLD: 0 K/UL (ref 0–0.2)
PLATELET # BLD AUTO: 164 K/UL (ref 150–450)
PMV BLD AUTO: 11 FL (ref 9.4–12.3)
POTASSIUM SERPL-SCNC: 4.1 MMOL/L (ref 3.5–5.1)
PROT SERPL-MCNC: 6.7 G/DL (ref 6.3–8.2)
RBC # BLD AUTO: 4.83 M/UL (ref 4.23–5.6)
SODIUM SERPL-SCNC: 140 MMOL/L (ref 138–145)
TRIGL SERPL-MCNC: 125 MG/DL (ref 35–150)
TSH, 3RD GENERATION: 2.83 UIU/ML (ref 0.36–3.74)
VLDLC SERPL CALC-MCNC: 25 MG/DL (ref 6–23)
WBC # BLD AUTO: 5.2 K/UL (ref 4.3–11.1)

## 2022-08-01 PROCEDURE — 99214 OFFICE O/P EST MOD 30 MIN: CPT | Performed by: FAMILY MEDICINE

## 2022-08-01 PROCEDURE — 1036F TOBACCO NON-USER: CPT | Performed by: FAMILY MEDICINE

## 2022-08-01 PROCEDURE — 3044F HG A1C LEVEL LT 7.0%: CPT | Performed by: FAMILY MEDICINE

## 2022-08-01 PROCEDURE — G8417 CALC BMI ABV UP PARAM F/U: HCPCS | Performed by: FAMILY MEDICINE

## 2022-08-01 PROCEDURE — G0439 PPPS, SUBSEQ VISIT: HCPCS | Performed by: FAMILY MEDICINE

## 2022-08-01 PROCEDURE — 1123F ACP DISCUSS/DSCN MKR DOCD: CPT | Performed by: FAMILY MEDICINE

## 2022-08-01 PROCEDURE — G8427 DOCREV CUR MEDS BY ELIG CLIN: HCPCS | Performed by: FAMILY MEDICINE

## 2022-08-01 SDOH — ECONOMIC STABILITY: FOOD INSECURITY: WITHIN THE PAST 12 MONTHS, THE FOOD YOU BOUGHT JUST DIDN'T LAST AND YOU DIDN'T HAVE MONEY TO GET MORE.: NEVER TRUE

## 2022-08-01 SDOH — ECONOMIC STABILITY: FOOD INSECURITY: WITHIN THE PAST 12 MONTHS, YOU WORRIED THAT YOUR FOOD WOULD RUN OUT BEFORE YOU GOT MONEY TO BUY MORE.: NEVER TRUE

## 2022-08-01 ASSESSMENT — PATIENT HEALTH QUESTIONNAIRE - PHQ9
SUM OF ALL RESPONSES TO PHQ QUESTIONS 1-9: 0
1. LITTLE INTEREST OR PLEASURE IN DOING THINGS: 0
2. FEELING DOWN, DEPRESSED OR HOPELESS: 0
SUM OF ALL RESPONSES TO PHQ QUESTIONS 1-9: 0
SUM OF ALL RESPONSES TO PHQ9 QUESTIONS 1 & 2: 0
SUM OF ALL RESPONSES TO PHQ QUESTIONS 1-9: 0
SUM OF ALL RESPONSES TO PHQ QUESTIONS 1-9: 0

## 2022-08-01 ASSESSMENT — LIFESTYLE VARIABLES
HOW MANY STANDARD DRINKS CONTAINING ALCOHOL DO YOU HAVE ON A TYPICAL DAY: PATIENT DOES NOT DRINK
HOW OFTEN DO YOU HAVE A DRINK CONTAINING ALCOHOL: NEVER

## 2022-08-01 ASSESSMENT — SOCIAL DETERMINANTS OF HEALTH (SDOH): HOW HARD IS IT FOR YOU TO PAY FOR THE VERY BASICS LIKE FOOD, HOUSING, MEDICAL CARE, AND HEATING?: NOT HARD AT ALL

## 2022-08-01 ASSESSMENT — ENCOUNTER SYMPTOMS
BLOOD IN STOOL: 0
SHORTNESS OF BREATH: 0
CHEST TIGHTNESS: 0
ABDOMINAL PAIN: 0

## 2022-08-01 NOTE — PATIENT INSTRUCTIONS
Personalized Preventive Plan for Michelle Bush - 8/1/2022  Medicare offers a range of preventive health benefits. Some of the tests and screenings are paid in full while other may be subject to a deductible, co-insurance, and/or copay. Some of these benefits include a comprehensive review of your medical history including lifestyle, illnesses that may run in your family, and various assessments and screenings as appropriate. After reviewing your medical record and screening and assessments performed today your provider may have ordered immunizations, labs, imaging, and/or referrals for you. A list of these orders (if applicable) as well as your Preventive Care list are included within your After Visit Summary for your review. Other Preventive Recommendations:    A preventive eye exam performed by an eye specialist is recommended every 1-2 years to screen for glaucoma; cataracts, macular degeneration, and other eye disorders. A preventive dental visit is recommended every 6 months. Try to get at least 150 minutes of exercise per week or 10,000 steps per day on a pedometer . Order or download the FREE \"Exercise & Physical Activity: Your Everyday Guide\" from [employer]. Call 2-908.665.5437 or search [employer] online. You need 3275-7053 mg of calcium and 2251-2729 IU of vitamin D per day. It is possible to meet your calcium requirement with diet alone, but a vitamin D supplement is usually necessary to meet this goal.  When exposed to the sun, use a sunscreen that protects against both UVA and UVB radiation with an SPF of 30 or greater. Reapply every 2 to 3 hours or after sweating, drying off with a towel, or swimming. Always wear a seat belt when traveling in a car. Always wear a helmet when riding a bicycle or motorcycle.

## 2022-08-01 NOTE — PROGRESS NOTES
Medicare Annual Wellness Visit    Marine Marcano is here for Cholesterol Problem, Diabetes, Hypertension, and Medicare AWV    Assessment & Plan       1. PAF (paroxysmal atrial fibrillation) (HCC)  RRR today. Stable, continue current regimen. FU with cards as planned. - CBC with Auto Differential; Future  - TSH; Future    2. Primary hypertension  Stable. Continue current regimen, check renal function. - Comprehensive Metabolic Panel; Future  - Lipid Panel; Future    3. Controlled type 2 diabetes mellitus without complication, without long-term current use of insulin (HCC)  Stable, check A1C and renal function. - Comprehensive Metabolic Panel; Future  - Hemoglobin A1C; Future    4. Vertigo  Not fully controlled ever, but he's been having some good days lately. Call if he needs refill on meclizine. 5. Encounter for annual wellness exam in Medicare patient  Doing well, HM is UTD given that his TD and COVID booster are UTD per him. He has a living will after reviewing that with him. Recommendations for Preventive Services Due: see orders and patient instructions/AVS.  Recommended screening schedule for the next 5-10 years is provided to the patient in written form: see Patient Instructions/AVS.    FU 6m       Return for Medicare Annual Wellness Visit in 1 year. Subjective       DM: He has been diet controlled for a while now. Sugars have been well controlled. Lab Results   Component Value Date    LABA1C 6.3 (H) 02/01/2022     Lab Results   Component Value Date     02/01/2022     Lab Results   Component Value Date/Time     05/09/2022 03:22 PM    K 4.2 05/09/2022 03:22 PM     05/09/2022 03:22 PM    CO2 21 05/09/2022 03:22 PM    BUN 13 05/09/2022 03:22 PM    CREATININE 1.03 05/09/2022 03:22 PM    GLUCOSE 159 05/09/2022 03:22 PM    CALCIUM 9.3 05/09/2022 03:22 PM         Vertigo: Never goes away, managed with meclizine for decades. Has has full workup by ENT previously.  States it is not bothering him as bad as last time. HLD: He is on chronic rosuvastatin 10mg. Tolerates this well. Lab Results   Component Value Date    ALT 23 05/09/2022    AST 20 05/09/2022    ALKPHOS 72 05/09/2022    BILITOT 0.6 05/09/2022     Lab Results   Component Value Date    LDLCALC 65 05/24/2021        HTN:  Also has AFib, is managed by cardiology, is on Eliquis, Felcainide, Metoprolol 25 XL. BP is well controlled. BP Readings from Last 3 Encounters:   08/01/22 138/80   02/02/22 134/76   02/01/22 125/65     HM:  Lipids due  COVID Booster: He has had 4th shot even though I do not have records. TD due 2029             Patient's complete Health Risk Assessment and screening values have been reviewed and are found in Flowsheets. The following problems were reviewed today and where indicated follow up appointments were made and/or referrals ordered. Positive Risk Factor Screenings with Interventions:             General Health and ACP:  General  In general, how would you say your health is?: Excellent  In the past 7 days, have you experienced any of the following: New or Increased Pain, New or Increased Fatigue, Loneliness, Social Isolation, Stress or Anger?: No  Do you get the social and emotional support that you need?: Yes  Do you have a Living Will?: Yes    Advance Directives       Power of  Living Will ACP-Advance Directive ACP-Power of     Not on File Not on File Not on File Not on File          General Health Risk Interventions:  No Living Will: Advance Care Planning addressed with patient today    Health Habits/Nutrition:  Physical Activity: Inactive    Days of Exercise per Week: 0 days    Minutes of Exercise per Session: 0 min     Have you lost any weight without trying in the past 3 months?: No     Have you seen the dentist within the past year?: Yes  Health Habits/Nutrition Interventions:  Continue light walks             Objective   There were no vitals filed for this visit. There is no height or weight on file to calculate BMI. Review of Systems   Constitutional:  Negative for chills and fever. Respiratory:  Negative for chest tightness and shortness of breath. Gastrointestinal:  Negative for abdominal pain and blood in stool. Genitourinary:  Negative for hematuria. Physical Exam  Vitals and nursing note reviewed. Constitutional:       Appearance: Normal appearance. HENT:      Head: Normocephalic and atraumatic. Right Ear: External ear normal.      Left Ear: External ear normal.      Mouth/Throat:      Mouth: Mucous membranes are moist.   Eyes:      General: No scleral icterus. Extraocular Movements: Extraocular movements intact. Conjunctiva/sclera: Conjunctivae normal.      Pupils: Pupils are equal, round, and reactive to light. Cardiovascular:      Rate and Rhythm: Normal rate and regular rhythm. Pulses: Normal pulses. Heart sounds: Normal heart sounds. No murmur heard. No friction rub. No gallop. Pulmonary:      Effort: Pulmonary effort is normal. No respiratory distress. Breath sounds: Normal breath sounds. No wheezing. Abdominal:      General: Bowel sounds are normal. There is no distension. Palpations: Abdomen is soft. There is no mass. Tenderness: There is no abdominal tenderness. There is no right CVA tenderness or left CVA tenderness. Musculoskeletal:         General: No tenderness or signs of injury. Normal range of motion. Cervical back: Normal range of motion and neck supple. No rigidity. Right lower leg: No edema. Left lower leg: No edema. Skin:     General: Skin is warm and dry. Coloration: Skin is not pale. Findings: No rash. Neurological:      General: No focal deficit present. Mental Status: He is alert and oriented to person, place, and time. Mental status is at baseline. Cranial Nerves: No cranial nerve deficit.       Gait: Gait normal.      Deep Tendon Reflexes: Reflexes normal.   Psychiatric:         Mood and Affect: Mood normal.         Behavior: Behavior normal.         Thought Content: Thought content normal.         Judgment: Judgment normal.             Allergies   Allergen Reactions    Amoxicillin Rash     Prior to Visit Medications    Medication Sig Taking?  Authorizing Provider   rosuvastatin (CRESTOR) 10 MG tablet Take 1 tablet by mouth daily TAKE 1 TABLET BY MOUTH EVERY DAY Yes Estuardo Haq MD   apixaban (ELIQUIS) 5 MG TABS tablet Take 5 mg by mouth every 12 hours Yes Ar Automatic Reconciliation   aspirin 81 MG EC tablet Take 81 mg by mouth daily Yes Ar Automatic Reconciliation   Flaxseed, Linseed, (FLAX SEED OIL) 1000 MG CAPS Take 1 capsule by mouth daily Yes Ar Automatic Reconciliation   flecainide (TAMBOCOR) 100 MG tablet TAKE 1 TABLET BY MOUTH TWICE A DAY Yes Ar Automatic Reconciliation   metoprolol succinate (TOPROL XL) 25 MG extended release tablet Take 12.5 mg by mouth daily Yes Ar Automatic Reconciliation       CareTeam (Including outside providers/suppliers regularly involved in providing care):   Patient Care Team:  Estuardo Haq MD as PCP - General (Family Medicine)  Estuardo Haq MD as PCP - [facility] Empaneled Provider     Reviewed and updated this visit:  Tobacco  Allergies  Meds  Problems  Med Hx  Surg Hx  Soc Hx  Fam Hx

## 2022-08-03 ENCOUNTER — OFFICE VISIT (OUTPATIENT)
Dept: CARDIOLOGY CLINIC | Age: 77
End: 2022-08-03
Payer: MEDICARE

## 2022-08-03 VITALS
WEIGHT: 227 LBS | BODY MASS INDEX: 32.5 KG/M2 | DIASTOLIC BLOOD PRESSURE: 80 MMHG | HEIGHT: 70 IN | SYSTOLIC BLOOD PRESSURE: 138 MMHG | HEART RATE: 65 BPM

## 2022-08-03 DIAGNOSIS — I48.0 PAF (PAROXYSMAL ATRIAL FIBRILLATION) (HCC): Primary | ICD-10-CM

## 2022-08-03 DIAGNOSIS — I10 PRIMARY HYPERTENSION: ICD-10-CM

## 2022-08-03 PROCEDURE — G8417 CALC BMI ABV UP PARAM F/U: HCPCS | Performed by: INTERNAL MEDICINE

## 2022-08-03 PROCEDURE — 1036F TOBACCO NON-USER: CPT | Performed by: INTERNAL MEDICINE

## 2022-08-03 PROCEDURE — 1123F ACP DISCUSS/DSCN MKR DOCD: CPT | Performed by: INTERNAL MEDICINE

## 2022-08-03 PROCEDURE — G8427 DOCREV CUR MEDS BY ELIG CLIN: HCPCS | Performed by: INTERNAL MEDICINE

## 2022-08-03 PROCEDURE — 93000 ELECTROCARDIOGRAM COMPLETE: CPT | Performed by: INTERNAL MEDICINE

## 2022-08-03 PROCEDURE — 99214 OFFICE O/P EST MOD 30 MIN: CPT | Performed by: INTERNAL MEDICINE

## 2022-08-03 NOTE — PROGRESS NOTES
[address]  PHONE: [phone]  1945    SUBJECTIVE:   Claudy Frazier is a 69 y.o. male seen for a follow up visit regarding the following:     Chief Complaint   Patient presents with    Irregular Heart Beat     6 month fu       HPI:    Claudy Frazier is a very pleasant 69 y.o. male with a past medical and cardiac history significant for HLP, no prior cardiac problems, DM type 2 diet controlled, no prior CVA, and pAF and presents for follow up. Pt presents back again today and is doing well on flecainide. He has had a few episode of rapid HR, likely AF. He has aleisha taking metoprolol PRN with resolution. Cardiac PMH: (Old records have been reviewed and summarized below)   TTE (12/18): EF 50-55%, mild LAE      Reviewed office note Dr. Everrett Schaumann 8/1/22    Past Medical History, Past Surgical History, Family history, Social History, and Medications were all reviewed with the patient today and updated as necessary. Current Outpatient Medications   Medication Sig Dispense Refill    rosuvastatin (CRESTOR) 10 MG tablet Take 1 tablet by mouth daily TAKE 1 TABLET BY MOUTH EVERY DAY 90 tablet 1    apixaban (ELIQUIS) 5 MG TABS tablet Take 5 mg by mouth every 12 hours      aspirin 81 MG EC tablet Take 81 mg by mouth daily      Flaxseed, Linseed, (FLAX SEED OIL) 1000 MG CAPS Take 1 capsule by mouth daily      flecainide (TAMBOCOR) 100 MG tablet TAKE 1 TABLET BY MOUTH TWICE A DAY      metoprolol succinate (TOPROL XL) 25 MG extended release tablet Take 12.5 mg by mouth in the morning. Indications: ONLY TAKING PRN. No current facility-administered medications for this visit.      Allergies   Allergen Reactions    Amoxicillin Rash     [unfilled]  Past Medical History:   Diagnosis Date    Allergic rhinitis     takes allegra    Arrhythmia     pt states \"heart will skip a beat\"    Arthritis of right knee 6/2/2020    Controlled type 2 diabetes mellitus without complication, without long-term current use of insulin ([address] 3/2/2017    GERD (gastroesophageal reflux disease)     Hearing loss of both ears     pt states left ear worse than right    Heel pain, chronic, right 9/7/2018    Hypercholesterolemia     managed w/med    Laceration of left hand without foreign body 12/3/2019    Obesity (BMI 30.0-34.9) 9/19/2017    Perforation of left tympanic membrane     Primary hypertension 2/1/2022    SCC (squamous cell carcinoma) 1/6/2020    Sleep apnea     pt states not able to tolerate cpap    Vertigo, benign paroxysmal      Past Surgical History:   Procedure Laterality Date    COLONOSCOPY      Clear, 10 year repeat 2025    HEENT      L ear tympanoplasty - Nirali    ORTHOPEDIC SURGERY Left 2005    thumb tendon repair     Family History   Problem Relation Age of Onset    Stroke Father     Alzheimer's Disease Mother     Diabetes Brother      Social History     Tobacco Use    Smoking status: Never    Smokeless tobacco: Never   Substance Use Topics    Alcohol use: No       PHYSICAL EXAM:    /80   Pulse 65   Ht 5' 10\" (1.778 m)   Wt 227 lb (103 kg)   BMI 32.57 kg/m²      Wt Readings from Last 5 Encounters:   08/03/22 227 lb (103 kg)   08/01/22 224 lb (101.6 kg)   05/09/22 229 lb (103.9 kg)   02/02/22 229 lb (103.9 kg)   02/01/22 230 lb (104.3 kg)       BP Readings from Last 5 Encounters:   08/03/22 138/80   08/01/22 138/80   02/02/22 134/76   02/01/22 125/65   12/21/21 120/75       General appearance: Alert, well appearing, and in no distress   CV: RRR, no M/R/G, no JVD, normal distal pulses, no lower extremity edema  Pulm: Clear to auscultation, no wheezes, no crackles, no accessory muscle use  GI: Soft, nontender, nondistended  Neuro: Alert and oriented    Medical problems and test results were reviewed with the patient today.      Lab Results   Component Value Date/Time    K 4.1 08/01/2022 11:03 AM     Creatinine   Date Value Ref Range Status   08/01/2022 1.10 0.8 - 1.5 MG/DL Final     Lab Results   Component Value Date/Time    HGB 14.5 08/01/2022 11:03 AM     No results found for: INR, PTMR, PT1    Lab Results   Component Value Date    WBC 5.2 08/01/2022    HGB 14.5 08/01/2022    HCT 45.0 08/01/2022    MCV 93.2 08/01/2022     08/01/2022      Lab Results   Component Value Date/Time     08/01/2022 11:03 AM    K 4.1 08/01/2022 11:03 AM     08/01/2022 11:03 AM    CO2 26 08/01/2022 11:03 AM    BUN 9 08/01/2022 11:03 AM    CREATININE 1.10 08/01/2022 11:03 AM    GLUCOSE 130 08/01/2022 11:03 AM    CALCIUM 8.6 08/01/2022 11:03 AM         EKG: (EKG has been independently visualized by me with interpretation below)  Sinus  Rhythm  -First degree A-V block   Earl = 234  -Nonspecific QRS widening and anterior fascicular block. Candi Dumont was seen today for irregular heart beat. Diagnoses and all orders for this visit:    PAF (paroxysmal atrial fibrillation) ([facility] [address]  -     EKG 12 lead    Primary hypertension      ASSESSMENT and PLAN   1. Atrial fibrillation and atrial flutter:  Pt wants to continue flecainide at this time. Will call with worsening  palpitations. --flecainide 100mg BID        --metoprolol 12.5mg        2. CVA protection:  eliquis 5mg Q12H, no bleeding problems      3. Flecainide: EKG stable, cont current dose      4. ABDIRAHMAN: untreated, recommended sleep consultation, PT does not want to pursue as discussed last visit    Patient has been instructed and agrees to call our office with any issues or other concerns related to their cardiac condition(s) and/or complaint(s). Return in about 6 months (around 2/3/2023). Karlos Mathis MD, MS  Cardiology/Electrophysiology  8/3/2022  9:29 AM

## 2022-10-03 ENCOUNTER — OFFICE VISIT (OUTPATIENT)
Dept: ENT CLINIC | Age: 77
End: 2022-10-03
Payer: MEDICARE

## 2022-10-03 VITALS
HEIGHT: 69 IN | WEIGHT: 231.4 LBS | BODY MASS INDEX: 34.27 KG/M2 | SYSTOLIC BLOOD PRESSURE: 130 MMHG | DIASTOLIC BLOOD PRESSURE: 80 MMHG

## 2022-10-03 DIAGNOSIS — M26.609 TMJ (TEMPOROMANDIBULAR JOINT DISORDER): ICD-10-CM

## 2022-10-03 DIAGNOSIS — J30.9 ALLERGIC RHINITIS, UNSPECIFIED SEASONALITY, UNSPECIFIED TRIGGER: Primary | ICD-10-CM

## 2022-10-03 DIAGNOSIS — H90.3 SENSORINEURAL HEARING LOSS, BILATERAL: ICD-10-CM

## 2022-10-03 PROCEDURE — 1123F ACP DISCUSS/DSCN MKR DOCD: CPT | Performed by: STUDENT IN AN ORGANIZED HEALTH CARE EDUCATION/TRAINING PROGRAM

## 2022-10-03 PROCEDURE — 1036F TOBACCO NON-USER: CPT | Performed by: STUDENT IN AN ORGANIZED HEALTH CARE EDUCATION/TRAINING PROGRAM

## 2022-10-03 PROCEDURE — G8427 DOCREV CUR MEDS BY ELIG CLIN: HCPCS | Performed by: STUDENT IN AN ORGANIZED HEALTH CARE EDUCATION/TRAINING PROGRAM

## 2022-10-03 PROCEDURE — G8417 CALC BMI ABV UP PARAM F/U: HCPCS | Performed by: STUDENT IN AN ORGANIZED HEALTH CARE EDUCATION/TRAINING PROGRAM

## 2022-10-03 PROCEDURE — 99214 OFFICE O/P EST MOD 30 MIN: CPT | Performed by: STUDENT IN AN ORGANIZED HEALTH CARE EDUCATION/TRAINING PROGRAM

## 2022-10-03 PROCEDURE — G8484 FLU IMMUNIZE NO ADMIN: HCPCS | Performed by: STUDENT IN AN ORGANIZED HEALTH CARE EDUCATION/TRAINING PROGRAM

## 2022-10-03 ASSESSMENT — ENCOUNTER SYMPTOMS
COUGH: 0
EYE DISCHARGE: 0
ABDOMINAL PAIN: 0

## 2022-10-03 NOTE — PROGRESS NOTES
eustachian tubes    Recurrent acute suppurative otitis media of right ear without spontaneous rupture of tympanic membrane    Drug-induced constipation    Seasonal allergic rhinitis    Gastroesophageal reflux disease without esophagitis    Screening for alcoholism    Controlled type 2 diabetes mellitus without complication, without long-term current use of insulin ([facility])    Screening for depression    Allergic rhinitis    Acute otitis media     Current Outpatient Medications   Medication Sig    rosuvastatin (CRESTOR) 10 MG tablet Take 1 tablet by mouth daily TAKE 1 TABLET BY MOUTH EVERY DAY    apixaban (ELIQUIS) 5 MG TABS tablet Take 5 mg by mouth every 12 hours    aspirin 81 MG EC tablet Take 81 mg by mouth daily    Flaxseed, Linseed, (FLAX SEED OIL) 1000 MG CAPS Take 1 capsule by mouth daily    flecainide (TAMBOCOR) 100 MG tablet TAKE 1 TABLET BY MOUTH TWICE A DAY    metoprolol succinate (TOPROL XL) 25 MG extended release tablet Take 12.5 mg by mouth in the morning. Indications: ONLY TAKING PRN. No current facility-administered medications for this visit.      Past Medical History:   Diagnosis Date    Allergic rhinitis     takes allegra    Arrhythmia     pt states \"heart will skip a beat\"    Arthritis of right knee 6/2/2020    Controlled type 2 diabetes mellitus without complication, without long-term current use of insulin ([facility] [address] 3/2/2017    GERD (gastroesophageal reflux disease)     Hearing loss of both ears     pt states left ear worse than right    Heel pain, chronic, right 9/7/2018    Hypercholesterolemia     managed w/med    Laceration of left hand without foreign body 12/3/2019    Obesity (BMI 30.0-34.9) 9/19/2017    Perforation of left tympanic membrane     Primary hypertension 2/1/2022    SCC (squamous cell carcinoma) 1/6/2020    Sleep apnea     pt states not able to tolerate cpap    Vertigo, benign paroxysmal      Social History     Tobacco Use    Smoking status: Never    Smokeless tobacco: Never Substance Use Topics    Alcohol use: No     Past Surgical History:   Procedure Laterality Date    COLONOSCOPY      Clear, 10 year repeat 2025    HEENT      L ear tympanoplasty - Nirali    ORTHOPEDIC SURGERY Left 2005    thumb tendon repair     Family History   Problem Relation Age of Onset    Stroke Father     Alzheimer's Disease Mother     Diabetes Brother         ROS:    Review of Systems   Constitutional:  Negative for fever. HENT:  Positive for hearing loss. Negative for ear discharge and ear pain. Eyes:  Negative for discharge. Respiratory:  Negative for cough. Cardiovascular:  Negative for chest pain. Gastrointestinal:  Negative for abdominal pain. Musculoskeletal:  Negative for neck pain. Skin:  Negative for rash. Allergic/Immunologic: Negative for environmental allergies. Neurological:  Negative for dizziness. Hematological:  Negative for adenopathy. Psychiatric/Behavioral:  Negative for agitation. PHYSICAL EXAM:    /80 (Site: Left Upper Arm, Position: Sitting)   Ht 5' 9\" (1.753 m)   Wt 231 lb 6.4 oz (105 kg)   BMI 34.17 kg/m²     General: NAD, well-appearing  Neuro: No gross neuro deficits. CN's II-XII intact. No facial weakness. Eyes: EOMI. Pupils reactive. No periorbital edema/ecchymosis. Skin: No facial erythema, rashes or concerning lesions. Nose: No external deviations or saddling. Intranasally, septum is midline without perforations, nasal mucosa appears healthy with no erythema, mucopurulence, or polyps. Mouth: Moist mucus membranes, normal tongue/palate mobility, no concerning mucosal lesions. Oropharynx: clear with no erythema/exudate, no tonsillar hypertrophy. Ears: Normal appearing auricles, no hematomas. EACs clear with no cerumen impaction, healthy canal skin, TM's intact with no perforations or retraction pockets. No middle ear effusions. Neck: Soft, supple, no palpable lateral neck masses. No parotid or submandibular masses.  No thyromegaly or palpable thyroid nodules. No surgical scars. Lymphatics: No palpable cervical LAD. Resp/Lungs: No audible stridor or wheezing, CTAB  Heart: RRR  Extremities: No clubbing or cyanosis. Lab Results   Component Value Date    WBC 5.2 08/01/2022    HGB 14.5 08/01/2022    HCT 45.0 08/01/2022    MCV 93.2 08/01/2022     08/01/2022     Lab Results   Component Value Date/Time     08/01/2022 11:03 AM    K 4.1 08/01/2022 11:03 AM     08/01/2022 11:03 AM    CO2 26 08/01/2022 11:03 AM    BUN 9 08/01/2022 11:03 AM    CREATININE 1.10 08/01/2022 11:03 AM    GLUCOSE 130 08/01/2022 11:03 AM    CALCIUM 8.6 08/01/2022 11:03 AM        ASSESSMENT and PLAN  77yM h/o left tympanoplasty 11/19/15 w/ Dr. Caroleen Burkitt. Normal ear exam today. I recommended he get an updated audiogram through the [facility] and consider hearing aids. If he has any issues with the [facility] he will call our office and schedule an appointment with our audiologist.    ICD-10-CM    1. Allergic rhinitis, unspecified seasonality, unspecified trigger  J30.9       2. Sensorineural hearing loss, bilateral  H90.3       3. TMJ (temporomandibular joint disorder)  M26.609             Ketan Nesbitt MD  10/3/2022  Electronically signed    Note dictated using voice recognition software. Please excuse any typos.

## 2022-10-27 ENCOUNTER — TELEMEDICINE (OUTPATIENT)
Dept: FAMILY MEDICINE CLINIC | Facility: CLINIC | Age: 77
End: 2022-10-27
Payer: MEDICARE

## 2022-10-27 DIAGNOSIS — J02.9 ACUTE PHARYNGITIS, UNSPECIFIED ETIOLOGY: Primary | ICD-10-CM

## 2022-10-27 DIAGNOSIS — R42 VERTIGO: ICD-10-CM

## 2022-10-27 DIAGNOSIS — R68.89 FLU-LIKE SYMPTOMS: ICD-10-CM

## 2022-10-27 LAB
EXP DATE SOLUTION: NORMAL
EXP DATE SWAB: NORMAL
EXPIRATION DATE: NORMAL
INFLUENZA A ANTIGEN, POC: NEGATIVE
INFLUENZA B ANTIGEN, POC: NEGATIVE
LOT NUMBER POC: NORMAL
LOT NUMBER SOLUTION: NORMAL
LOT NUMBER SWAB: NORMAL
SARS-COV-2 RNA, POC: NEGATIVE
VALID INTERNAL CONTROL, POC: YES

## 2022-10-27 PROCEDURE — 99214 OFFICE O/P EST MOD 30 MIN: CPT | Performed by: FAMILY MEDICINE

## 2022-10-27 PROCEDURE — 87635 SARS-COV-2 COVID-19 AMP PRB: CPT | Performed by: FAMILY MEDICINE

## 2022-10-27 PROCEDURE — G8427 DOCREV CUR MEDS BY ELIG CLIN: HCPCS | Performed by: FAMILY MEDICINE

## 2022-10-27 PROCEDURE — 87804 INFLUENZA ASSAY W/OPTIC: CPT | Performed by: FAMILY MEDICINE

## 2022-10-27 PROCEDURE — 1123F ACP DISCUSS/DSCN MKR DOCD: CPT | Performed by: FAMILY MEDICINE

## 2022-10-27 RX ORDER — CEFDINIR 300 MG/1
300 CAPSULE ORAL 2 TIMES DAILY
Qty: 14 CAPSULE | Refills: 0 | Status: SHIPPED | OUTPATIENT
Start: 2022-10-27 | End: 2022-11-03

## 2022-10-27 ASSESSMENT — ENCOUNTER SYMPTOMS
COUGH: 1
SINUS PRESSURE: 1
SINUS PAIN: 1
NAUSEA: 1

## 2022-10-27 NOTE — LETTER
Flu/COVID neg. Putting him on omnicef to cover for throat/ear infection since he is dizzy. If his symptoms worsen over the weekend, seek out urgent care or ER treatment. If not any better at all by Monday, call the office for an appointment. Thanks.

## 2022-10-27 NOTE — PROGRESS NOTES
EXAMINED]    Constitutional: [x] Appears well-developed and well-nourished [x] No apparent distress      [] Abnormal -     Mental status: [x] Alert and awake  [x] Oriented to person/place/time [x] Able to follow commands    [] Abnormal -     Eyes:   EOM    [x]  Normal    [] Abnormal -   Sclera  [x]  Normal    [] Abnormal -          Discharge [x]  None visible   [] Abnormal -     HENT: [x] Normocephalic, atraumatic  [] Abnormal -   [x] Mouth/Throat: Mucous membranes are moist    External Ears [x] Normal  [] Abnormal -    Neck: [x] No visualized mass [] Abnormal -     Pulmonary/Chest: [x] Respiratory effort normal   [x] No visualized signs of difficulty breathing or respiratory distress        [] Abnormal -      Musculoskeletal:   [x] Normal gait with no signs of ataxia         [x] Normal range of motion of neck        [] Abnormal -     Neurological:        [x] No Facial Asymmetry (Cranial nerve 7 motor function) (limited exam due to video visit)          [x] No gaze palsy        [] Abnormal -          Skin:        [x] No significant exanthematous lesions or discoloration noted on facial skin         [] Abnormal -            Psychiatric:       [x] Normal Affect [] Abnormal -        [x] No Hallucinations    Other pertinent observable physical exam findings:-             Hector Campbell, was evaluated through a synchronous (real-time) audio-video encounter. The patient (or guardian if applicable) is aware that this is a billable service, which includes applicable co-pays. This Virtual Visit was conducted with patient's (and/or legal guardian's) consent. The visit was conducted pursuant to the emergency declaration under the [address] [place] [address], [address] [place] [address] authority and the [employer] General Act. Patient identification was verified, and a caregiver was present when appropriate.    The patient was located at Home: [address] [place] [phone]. Provider was located at [facility] (Appt Dept): [address] ,  [facility] 4.         --Nacho Merino MD

## 2022-11-21 ENCOUNTER — OFFICE VISIT (OUTPATIENT)
Dept: FAMILY MEDICINE CLINIC | Facility: CLINIC | Age: 77
End: 2022-11-21
Payer: MEDICARE

## 2022-11-21 VITALS
BODY MASS INDEX: 33.36 KG/M2 | DIASTOLIC BLOOD PRESSURE: 72 MMHG | HEIGHT: 70 IN | SYSTOLIC BLOOD PRESSURE: 124 MMHG | WEIGHT: 233 LBS

## 2022-11-21 DIAGNOSIS — H66.93 ACUTE OTITIS MEDIA, BILATERAL: Primary | ICD-10-CM

## 2022-11-21 DIAGNOSIS — N40.1 BENIGN PROSTATIC HYPERPLASIA WITH URINARY HESITANCY: ICD-10-CM

## 2022-11-21 DIAGNOSIS — E78.2 MIXED HYPERLIPIDEMIA: ICD-10-CM

## 2022-11-21 DIAGNOSIS — R39.11 BENIGN PROSTATIC HYPERPLASIA WITH URINARY HESITANCY: ICD-10-CM

## 2022-11-21 LAB — PSA SERPL-MCNC: 0.7 NG/ML

## 2022-11-21 PROCEDURE — 1036F TOBACCO NON-USER: CPT | Performed by: FAMILY MEDICINE

## 2022-11-21 PROCEDURE — 1123F ACP DISCUSS/DSCN MKR DOCD: CPT | Performed by: FAMILY MEDICINE

## 2022-11-21 PROCEDURE — G8417 CALC BMI ABV UP PARAM F/U: HCPCS | Performed by: FAMILY MEDICINE

## 2022-11-21 PROCEDURE — 3078F DIAST BP <80 MM HG: CPT | Performed by: FAMILY MEDICINE

## 2022-11-21 PROCEDURE — 99214 OFFICE O/P EST MOD 30 MIN: CPT | Performed by: FAMILY MEDICINE

## 2022-11-21 PROCEDURE — G8484 FLU IMMUNIZE NO ADMIN: HCPCS | Performed by: FAMILY MEDICINE

## 2022-11-21 PROCEDURE — G8427 DOCREV CUR MEDS BY ELIG CLIN: HCPCS | Performed by: FAMILY MEDICINE

## 2022-11-21 PROCEDURE — 3074F SYST BP LT 130 MM HG: CPT | Performed by: FAMILY MEDICINE

## 2022-11-21 RX ORDER — CEFDINIR 300 MG/1
300 CAPSULE ORAL 2 TIMES DAILY
Qty: 14 CAPSULE | Refills: 0 | Status: SHIPPED | OUTPATIENT
Start: 2022-11-21 | End: 2022-11-28

## 2022-11-21 RX ORDER — ROSUVASTATIN CALCIUM 10 MG/1
10 TABLET, COATED ORAL DAILY
Qty: 90 TABLET | Refills: 1 | Status: SHIPPED | OUTPATIENT
Start: 2022-11-21

## 2022-11-21 RX ORDER — TAMSULOSIN HYDROCHLORIDE 0.4 MG/1
0.4 CAPSULE ORAL DAILY
Qty: 90 CAPSULE | Refills: 1 | Status: SHIPPED | OUTPATIENT
Start: 2022-11-21

## 2022-11-21 RX ORDER — TAMSULOSIN HYDROCHLORIDE 0.4 MG/1
0.4 CAPSULE ORAL DAILY
Qty: 90 CAPSULE | Refills: 1 | Status: SHIPPED | OUTPATIENT
Start: 2022-11-21 | End: 2022-11-21 | Stop reason: SDUPTHER

## 2022-11-21 ASSESSMENT — ENCOUNTER SYMPTOMS
ABDOMINAL PAIN: 0
BLOOD IN STOOL: 0
CHEST TIGHTNESS: 0
SHORTNESS OF BREATH: 0

## 2022-11-21 NOTE — PROGRESS NOTES
[place]  _______________________________________  MD Jesica Haider, DO Leslee Moreland, MD Haley Ibarra MD    [address] , [address] [place] [address]  Phone: [phone]  Fax: [phone]    Merissa Jara (:  1945) is a 69 y.o. male,Established patient, here for evaluation of the following chief complaint(s):  Otalgia (R ear x yesterday )         ASSESSMENT/PLAN:    1. Acute otitis media, bilateral  Will do Omnicef x 7 days + probiotic. He generally does not clear these mild infections on his own. Call if no better by next week. 2. Mixed hyperlipidemia  Stable, continue current regimen. - rosuvastatin (CRESTOR) 10 MG tablet; Take 1 tablet by mouth daily TAKE 1 TABLET BY MOUTH EVERY DAY  Dispense: 90 tablet; Refill: 1    3. Benign prostatic hyperplasia with urinary hesitancy  Check PSA vector, start flomax. Warned him on orthostatic hypotension ar the first dose. Recheck on FU. - PSA, Diagnostic; Future  - PSA, Diagnostic    FU as previously scheduled    Subjective   SUBJECTIVE/OBJECTIVE:    Pt with hx of AR and AOM is here with unilateral ear pain x 2 days:    States his pain started yesterday which was colicky and would come and go. No F/C, no drainage from the ear. HLD: On atorvastatin 10, tolerates well. Asks for refill today. Lab Results   Component Value Date    ALT 24 2022    AST 15 2022    ALKPHOS 90 2022    BILITOT 0.6 2022     Lab Results   Component Value Date    LDLCALC 60 2022     BPH: Having weaker stream, some dribbling, and also some ED. Lab Results   Component Value Date    PSA 0.5 2022    PSA 0.5 2020     BP Readings from Last 3 Encounters:   22 124/72   10/03/22 130/80   22 138/80           Review of Systems   Constitutional:  Negative for chills and fever. HENT:  Positive for ear pain.     Respiratory:  Negative for chest tightness and shortness of breath. Gastrointestinal:  Negative for abdominal pain and blood in stool. Genitourinary:  Negative for hematuria. Objective   Physical Exam  Vitals and nursing note reviewed. Constitutional:       General: He is not in acute distress. Appearance: Normal appearance. He is not ill-appearing. HENT:      Head: Normocephalic and atraumatic. Right Ear: External ear normal.      Left Ear: External ear normal.      Ears:      Comments: BL Tms with inferior suppuration and central prominent capillaries. Mouth/Throat:      Mouth: Mucous membranes are moist.   Eyes:      General: No scleral icterus. Right eye: No discharge. Left eye: No discharge. Extraocular Movements: Extraocular movements intact. Pupils: Pupils are equal, round, and reactive to light. Cardiovascular:      Rate and Rhythm: Normal rate and regular rhythm. Pulses: Normal pulses. Heart sounds: No murmur heard. No friction rub. No gallop. Pulmonary:      Effort: Pulmonary effort is normal. No respiratory distress. Breath sounds: Normal breath sounds. Abdominal:      General: Abdomen is flat. Bowel sounds are normal.      Palpations: Abdomen is soft. Musculoskeletal:         General: No swelling or tenderness. Normal range of motion. Cervical back: Normal range of motion and neck supple. No rigidity. Right lower leg: No edema. Left lower leg: No edema. Skin:     General: Skin is warm and dry. Coloration: Skin is not pale. Neurological:      General: No focal deficit present. Mental Status: He is alert and oriented to person, place, and time. Mental status is at baseline. Psychiatric:         Mood and Affect: Mood normal.         Behavior: Behavior normal.         Thought Content: Thought content normal.                An electronic signature was used to authenticate this note.     --Kay Ma MD

## 2022-11-22 RX ORDER — FLECAINIDE ACETATE 100 MG/1
100 TABLET ORAL 2 TIMES DAILY
Qty: 180 TABLET | Refills: 3 | Status: SHIPPED | OUTPATIENT
Start: 2022-11-22

## 2022-11-22 RX ORDER — FLECAINIDE ACETATE 100 MG/1
TABLET ORAL
Qty: 60 TABLET | Refills: 0 | Status: SHIPPED | OUTPATIENT
Start: 2022-11-22

## 2022-11-22 RX ORDER — APIXABAN 5 MG/1
TABLET, FILM COATED ORAL
Qty: 60 TABLET | Refills: 0 | Status: SHIPPED | OUTPATIENT
Start: 2022-11-22

## 2022-11-28 ENCOUNTER — APPOINTMENT (RX ONLY)
Dept: URBAN - METROPOLITAN AREA CLINIC 24 | Facility: CLINIC | Age: 77
Setting detail: DERMATOLOGY
End: 2022-11-28

## 2022-11-28 DIAGNOSIS — L81.4 OTHER MELANIN HYPERPIGMENTATION: ICD-10-CM

## 2022-11-28 DIAGNOSIS — L57.8 OTHER SKIN CHANGES DUE TO CHRONIC EXPOSURE TO NONIONIZING RADIATION: ICD-10-CM

## 2022-11-28 DIAGNOSIS — D18.0 HEMANGIOMA: ICD-10-CM

## 2022-11-28 DIAGNOSIS — L57.0 ACTINIC KERATOSIS: ICD-10-CM

## 2022-11-28 DIAGNOSIS — D22 MELANOCYTIC NEVI: ICD-10-CM

## 2022-11-28 DIAGNOSIS — L82.1 OTHER SEBORRHEIC KERATOSIS: ICD-10-CM

## 2022-11-28 DIAGNOSIS — Z85.828 PERSONAL HISTORY OF OTHER MALIGNANT NEOPLASM OF SKIN: ICD-10-CM

## 2022-11-28 PROBLEM — D22.0 MELANOCYTIC NEVI OF LIP: Status: ACTIVE | Noted: 2022-11-28

## 2022-11-28 PROBLEM — D18.01 HEMANGIOMA OF SKIN AND SUBCUTANEOUS TISSUE: Status: ACTIVE | Noted: 2022-11-28

## 2022-11-28 PROCEDURE — 17003 DESTRUCT PREMALG LES 2-14: CPT

## 2022-11-28 PROCEDURE — ? LIQUID NITROGEN

## 2022-11-28 PROCEDURE — ? TREATMENT REGIMEN

## 2022-11-28 PROCEDURE — ? COUNSELING

## 2022-11-28 PROCEDURE — 99213 OFFICE O/P EST LOW 20 MIN: CPT | Mod: 25

## 2022-11-28 PROCEDURE — 17000 DESTRUCT PREMALG LESION: CPT

## 2022-11-28 ASSESSMENT — LOCATION ZONE DERM
LOCATION ZONE: ARM
LOCATION ZONE: LEG
LOCATION ZONE: HAND
LOCATION ZONE: NOSE
LOCATION ZONE: LIP
LOCATION ZONE: FACE
LOCATION ZONE: TRUNK

## 2022-11-28 ASSESSMENT — LOCATION DETAILED DESCRIPTION DERM
LOCATION DETAILED: LEFT LATERAL INFERIOR CHEST
LOCATION DETAILED: LEFT FOREHEAD
LOCATION DETAILED: RIGHT FOREHEAD
LOCATION DETAILED: RIGHT MEDIAL ZYGOMA
LOCATION DETAILED: RIGHT UPPER CUTANEOUS LIP
LOCATION DETAILED: RIGHT MEDIAL SUPERIOR CHEST
LOCATION DETAILED: LEFT NASAL DORSUM
LOCATION DETAILED: EPIGASTRIC SKIN
LOCATION DETAILED: RIGHT CENTRAL ZYGOMA
LOCATION DETAILED: LEFT LATERAL FOREHEAD
LOCATION DETAILED: LEFT NASAL SIDEWALL
LOCATION DETAILED: LEFT ULNAR DORSAL HAND
LOCATION DETAILED: RIGHT LATERAL TEMPLE
LOCATION DETAILED: RIGHT INFERIOR LATERAL FOREHEAD
LOCATION DETAILED: LEFT DISTAL POSTERIOR UPPER ARM
LOCATION DETAILED: RIGHT SUPERIOR UPPER BACK
LOCATION DETAILED: RIGHT INFERIOR MEDIAL UPPER BACK
LOCATION DETAILED: LEFT SUPERIOR FOREHEAD
LOCATION DETAILED: LEFT INFERIOR MEDIAL FOREHEAD
LOCATION DETAILED: LEFT INFERIOR FOREHEAD
LOCATION DETAILED: RIGHT LATERAL MALAR CHEEK
LOCATION DETAILED: RIGHT MID-UPPER BACK
LOCATION DETAILED: RIGHT POSTERIOR LATERAL MALLEOLUS
LOCATION DETAILED: RIGHT ELBOW
LOCATION DETAILED: LEFT ACHILLES SKIN
LOCATION DETAILED: LEFT LATERAL ZYGOMA
LOCATION DETAILED: RIGHT LATERAL ZYGOMA

## 2022-11-28 ASSESSMENT — LOCATION SIMPLE DESCRIPTION DERM
LOCATION SIMPLE: RIGHT ANKLE
LOCATION SIMPLE: LEFT ACHILLES SKIN
LOCATION SIMPLE: RIGHT ELBOW
LOCATION SIMPLE: NOSE
LOCATION SIMPLE: CHEST
LOCATION SIMPLE: RIGHT CHEEK
LOCATION SIMPLE: RIGHT TEMPLE
LOCATION SIMPLE: LEFT ZYGOMA
LOCATION SIMPLE: RIGHT LIP
LOCATION SIMPLE: RIGHT UPPER BACK
LOCATION SIMPLE: RIGHT FOREHEAD
LOCATION SIMPLE: LEFT HAND
LOCATION SIMPLE: RIGHT ZYGOMA
LOCATION SIMPLE: LEFT UPPER ARM
LOCATION SIMPLE: LEFT NOSE
LOCATION SIMPLE: ABDOMEN
LOCATION SIMPLE: LEFT FOREHEAD

## 2022-12-29 ENCOUNTER — OFFICE VISIT (OUTPATIENT)
Dept: FAMILY MEDICINE CLINIC | Facility: CLINIC | Age: 77
End: 2022-12-29
Payer: MEDICARE

## 2022-12-29 VITALS
WEIGHT: 233.8 LBS | HEIGHT: 70 IN | HEART RATE: 72 BPM | BODY MASS INDEX: 33.47 KG/M2 | SYSTOLIC BLOOD PRESSURE: 152 MMHG | DIASTOLIC BLOOD PRESSURE: 82 MMHG | TEMPERATURE: 96.6 F

## 2022-12-29 DIAGNOSIS — R42 VERTIGO: ICD-10-CM

## 2022-12-29 DIAGNOSIS — J30.9 ALLERGIC RHINITIS, UNSPECIFIED SEASONALITY, UNSPECIFIED TRIGGER: ICD-10-CM

## 2022-12-29 DIAGNOSIS — H65.06 RECURRENT ACUTE SEROUS OTITIS MEDIA OF BOTH EARS: Primary | ICD-10-CM

## 2022-12-29 DIAGNOSIS — H69.83 DYSFUNCTION OF BOTH EUSTACHIAN TUBES: ICD-10-CM

## 2022-12-29 PROCEDURE — 1036F TOBACCO NON-USER: CPT | Performed by: FAMILY MEDICINE

## 2022-12-29 PROCEDURE — G8484 FLU IMMUNIZE NO ADMIN: HCPCS | Performed by: FAMILY MEDICINE

## 2022-12-29 PROCEDURE — 3074F SYST BP LT 130 MM HG: CPT | Performed by: FAMILY MEDICINE

## 2022-12-29 PROCEDURE — 3078F DIAST BP <80 MM HG: CPT | Performed by: FAMILY MEDICINE

## 2022-12-29 PROCEDURE — G8417 CALC BMI ABV UP PARAM F/U: HCPCS | Performed by: FAMILY MEDICINE

## 2022-12-29 PROCEDURE — G8427 DOCREV CUR MEDS BY ELIG CLIN: HCPCS | Performed by: FAMILY MEDICINE

## 2022-12-29 PROCEDURE — 99214 OFFICE O/P EST MOD 30 MIN: CPT | Performed by: FAMILY MEDICINE

## 2022-12-29 PROCEDURE — 1123F ACP DISCUSS/DSCN MKR DOCD: CPT | Performed by: FAMILY MEDICINE

## 2022-12-29 RX ORDER — PREDNISONE 10 MG/1
TABLET ORAL
Qty: 18 TABLET | Refills: 0 | Status: SHIPPED | OUTPATIENT
Start: 2022-12-29 | End: 2023-01-08

## 2022-12-29 RX ORDER — MECLIZINE HCL 12.5 MG/1
12.5 TABLET ORAL 3 TIMES DAILY PRN
Qty: 30 TABLET | Refills: 0 | Status: SHIPPED | OUTPATIENT
Start: 2022-12-29 | End: 2023-01-08

## 2022-12-29 RX ORDER — CEFDINIR 300 MG/1
300 CAPSULE ORAL 2 TIMES DAILY
Qty: 20 CAPSULE | Refills: 0 | Status: SHIPPED | OUTPATIENT
Start: 2022-12-29 | End: 2023-01-08

## 2022-12-29 NOTE — PROGRESS NOTES
[address]  Phone: [phone]  Email: [phone] Shannan; Established patient 69 y.o.male; seen 12/29/2022 for: Dizziness, Otalgia, Headache, and Nausea      Assessment & Plan    1. Recurrent acute serous otitis media of both ears  -     predniSONE (DELTASONE) 10 MG tablet; 3 pills/day X 3 days, then 2 pills/day X 3 days, then 1 pill/day X 2 days, then 1/2 pill/day X 2 days, Disp-18 tablet, R-0Normal  -     cefdinir (OMNICEF) 300 MG capsule; Take 1 capsule by mouth 2 times daily for 10 days, Disp-20 capsule, R-0Normal  2. Vertigo  -     predniSONE (DELTASONE) 10 MG tablet; 3 pills/day X 3 days, then 2 pills/day X 3 days, then 1 pill/day X 2 days, then 1/2 pill/day X 2 days, Disp-18 tablet, R-0Normal  -     meclizine (ANTIVERT) 12.5 MG tablet; Take 1 tablet by mouth 3 times daily as needed for Dizziness, Disp-30 tablet, R-0Normal  3. Dysfunction of both eustachian tubes  -     predniSONE (DELTASONE) 10 MG tablet; 3 pills/day X 3 days, then 2 pills/day X 3 days, then 1 pill/day X 2 days, then 1/2 pill/day X 2 days, Disp-18 tablet, R-0Normal  -     cefdinir (OMNICEF) 300 MG capsule; Take 1 capsule by mouth 2 times daily for 10 days, Disp-20 capsule, R-0Normal  4. Allergic rhinitis, unspecified seasonality, unspecified trigger  -     predniSONE (DELTASONE) 10 MG tablet; 3 pills/day X 3 days, then 2 pills/day X 3 days, then 1 pill/day X 2 days, then 1/2 pill/day X 2 days, Disp-18 tablet, R-0Normal    Problem and/or Symptoms are currently not stable and/or well controlled on current treatment plan. Will have patient follow up as directed and make the following changes for further evaluation and/or treatment:     Low dose steroid taper (pt has excellent glycemic control) along with Abx regimen X 10 D; Meclizine TID PRN. Check Out Instructions  Return if symptoms worsen or fail to improve.       Subjective & Objective    HPI  Pt c/o intermittent vertigo & ear pressure/discomfort for the past several days; getting worse over time. Has Hx of ETD with recurrent AOM & vertigo as a consequence of middle ear effusions. Review of Systems    Physical Exam  HENT:      Right Ear: Decreased hearing noted. A middle ear effusion is present. Tympanic membrane is retracted. Left Ear: Decreased hearing noted. A middle ear effusion is present. Nose: Congestion present. Mouth/Throat:      Pharynx: Pharyngeal swelling and posterior oropharyngeal erythema present. Vitals:    12/29/22 1145 12/29/22 1148   BP: (!) 158/79 (!) 152/82   Site: Left Upper Arm Right Upper Arm   Position: Sitting Sitting   Cuff Size: Large Adult Large Adult   Pulse: 74 72   Temp: (!) 96.6 °F (35.9 °C)    Weight: 233 lb 12.8 oz (106.1 kg)    Height: 5' 10\" (1.778 m)      BP Readings from Last 3 Encounters:   12/29/22 (!) 152/82   11/21/22 124/72   10/03/22 130/80     Body mass index is 33.55 kg/m². Wt Readings from Last 3 Encounters:   12/29/22 233 lb 12.8 oz (106.1 kg)   11/21/22 233 lb (105.7 kg)   10/03/22 231 lb 6.4 oz (105 kg)       PHQ-9  8/1/2022   Little interest or pleasure in doing things 0   Little interest or pleasure in doing things -   Feeling down, depressed, or hopeless 0   PHQ-2 Score 0   Total Score PHQ 2 -   PHQ-9 Total Score 0        We discussed the typical prognosis and potential complications of the concern(s), including treatment options. Medication risks, benefits, costs, interactions, and alternatives were discussed as appropriate. I advised him to contact the office if his condition worsens or fails to improve as anticipated. He expressed understanding with the discussion and plan of care. An electronic signature was used to authenticate this note.   -- Frederic Epstein MD

## 2023-01-09 DIAGNOSIS — E78.2 MIXED HYPERLIPIDEMIA: ICD-10-CM

## 2023-01-09 RX ORDER — ROSUVASTATIN CALCIUM 10 MG/1
10 TABLET, COATED ORAL DAILY
Qty: 90 TABLET | Refills: 1 | Status: SHIPPED | OUTPATIENT
Start: 2023-01-09

## 2023-01-30 ENCOUNTER — OFFICE VISIT (OUTPATIENT)
Dept: FAMILY MEDICINE CLINIC | Facility: CLINIC | Age: 78
End: 2023-01-30
Payer: MEDICARE

## 2023-01-30 VITALS
DIASTOLIC BLOOD PRESSURE: 80 MMHG | WEIGHT: 233 LBS | HEIGHT: 69 IN | SYSTOLIC BLOOD PRESSURE: 118 MMHG | BODY MASS INDEX: 34.51 KG/M2

## 2023-01-30 DIAGNOSIS — I10 ESSENTIAL (PRIMARY) HYPERTENSION: ICD-10-CM

## 2023-01-30 DIAGNOSIS — I48.0 PAF (PAROXYSMAL ATRIAL FIBRILLATION) (HCC): ICD-10-CM

## 2023-01-30 DIAGNOSIS — E11.9 CONTROLLED TYPE 2 DIABETES MELLITUS WITHOUT COMPLICATION, WITHOUT LONG-TERM CURRENT USE OF INSULIN (HCC): ICD-10-CM

## 2023-01-30 DIAGNOSIS — E78.2 MIXED HYPERLIPIDEMIA: ICD-10-CM

## 2023-01-30 DIAGNOSIS — E78.2 MIXED HYPERLIPIDEMIA: Primary | ICD-10-CM

## 2023-01-30 LAB
ALBUMIN SERPL-MCNC: 4 G/DL (ref 3.2–4.6)
ALBUMIN/GLOB SERPL: 1.6 (ref 0.4–1.6)
ALP SERPL-CCNC: 89 U/L (ref 50–136)
ALT SERPL-CCNC: 34 U/L (ref 12–65)
ANION GAP SERPL CALC-SCNC: 8 MMOL/L (ref 2–11)
AST SERPL-CCNC: 16 U/L (ref 15–37)
BASOPHILS # BLD: 0.1 K/UL (ref 0–0.2)
BASOPHILS NFR BLD: 1 % (ref 0–2)
BILIRUB SERPL-MCNC: 0.6 MG/DL (ref 0.2–1.1)
BUN SERPL-MCNC: 10 MG/DL (ref 8–23)
CALCIUM SERPL-MCNC: 9.1 MG/DL (ref 8.3–10.4)
CHLORIDE SERPL-SCNC: 108 MMOL/L (ref 101–110)
CO2 SERPL-SCNC: 26 MMOL/L (ref 21–32)
CREAT SERPL-MCNC: 1 MG/DL (ref 0.8–1.5)
DIFFERENTIAL METHOD BLD: NORMAL
EOSINOPHIL # BLD: 0.2 K/UL (ref 0–0.8)
EOSINOPHIL NFR BLD: 3 % (ref 0.5–7.8)
ERYTHROCYTE [DISTWIDTH] IN BLOOD BY AUTOMATED COUNT: 13.7 % (ref 11.9–14.6)
EST. AVERAGE GLUCOSE BLD GHB EST-MCNC: 131 MG/DL
GLOBULIN SER CALC-MCNC: 2.5 G/DL (ref 2.8–4.5)
GLUCOSE SERPL-MCNC: 136 MG/DL (ref 65–100)
HBA1C MFR BLD: 6.2 % (ref 4.8–5.6)
HCT VFR BLD AUTO: 45 % (ref 41.1–50.3)
HGB BLD-MCNC: 14.7 G/DL (ref 13.6–17.2)
IMM GRANULOCYTES # BLD AUTO: 0 K/UL (ref 0–0.5)
IMM GRANULOCYTES NFR BLD AUTO: 0 % (ref 0–5)
LYMPHOCYTES # BLD: 1.4 K/UL (ref 0.5–4.6)
LYMPHOCYTES NFR BLD: 19 % (ref 13–44)
MCH RBC QN AUTO: 29.8 PG (ref 26.1–32.9)
MCHC RBC AUTO-ENTMCNC: 32.7 G/DL (ref 31.4–35)
MCV RBC AUTO: 91.1 FL (ref 82–102)
MONOCYTES # BLD: 0.6 K/UL (ref 0.1–1.3)
MONOCYTES NFR BLD: 8 % (ref 4–12)
NEUTS SEG # BLD: 4.9 K/UL (ref 1.7–8.2)
NEUTS SEG NFR BLD: 69 % (ref 43–78)
NRBC # BLD: 0 K/UL (ref 0–0.2)
PLATELET # BLD AUTO: 193 K/UL (ref 150–450)
PMV BLD AUTO: 11.4 FL (ref 9.4–12.3)
POTASSIUM SERPL-SCNC: 4.3 MMOL/L (ref 3.5–5.1)
PROT SERPL-MCNC: 6.5 G/DL (ref 6.3–8.2)
RBC # BLD AUTO: 4.94 M/UL (ref 4.23–5.6)
SODIUM SERPL-SCNC: 142 MMOL/L (ref 133–143)
WBC # BLD AUTO: 7.2 K/UL (ref 4.3–11.1)

## 2023-01-30 PROCEDURE — 1123F ACP DISCUSS/DSCN MKR DOCD: CPT | Performed by: FAMILY MEDICINE

## 2023-01-30 PROCEDURE — G8417 CALC BMI ABV UP PARAM F/U: HCPCS | Performed by: FAMILY MEDICINE

## 2023-01-30 PROCEDURE — 1036F TOBACCO NON-USER: CPT | Performed by: FAMILY MEDICINE

## 2023-01-30 PROCEDURE — 3079F DIAST BP 80-89 MM HG: CPT | Performed by: FAMILY MEDICINE

## 2023-01-30 PROCEDURE — G8484 FLU IMMUNIZE NO ADMIN: HCPCS | Performed by: FAMILY MEDICINE

## 2023-01-30 PROCEDURE — 99214 OFFICE O/P EST MOD 30 MIN: CPT | Performed by: FAMILY MEDICINE

## 2023-01-30 PROCEDURE — 3074F SYST BP LT 130 MM HG: CPT | Performed by: FAMILY MEDICINE

## 2023-01-30 PROCEDURE — G8427 DOCREV CUR MEDS BY ELIG CLIN: HCPCS | Performed by: FAMILY MEDICINE

## 2023-01-30 ASSESSMENT — PATIENT HEALTH QUESTIONNAIRE - PHQ9
SUM OF ALL RESPONSES TO PHQ QUESTIONS 1-9: 0
SUM OF ALL RESPONSES TO PHQ QUESTIONS 1-9: 0
1. LITTLE INTEREST OR PLEASURE IN DOING THINGS: 0
2. FEELING DOWN, DEPRESSED OR HOPELESS: 0
SUM OF ALL RESPONSES TO PHQ QUESTIONS 1-9: 0
SUM OF ALL RESPONSES TO PHQ9 QUESTIONS 1 & 2: 0
SUM OF ALL RESPONSES TO PHQ QUESTIONS 1-9: 0

## 2023-01-30 ASSESSMENT — ENCOUNTER SYMPTOMS
BLOOD IN STOOL: 0
SHORTNESS OF BREATH: 0
CHEST TIGHTNESS: 0
ABDOMINAL PAIN: 0

## 2023-01-30 NOTE — PROGRESS NOTES
[place]  _______________________________________  MD Berta Ventura, DO  Sung Specking, NP Honey Koyanagi, [place]    [address], [address] [place] [address]  Phone: [phone]  Fax: [phone]    Michael Srivastava (:  1945) is a 69 y.o. male,Established patient, here for evaluation of the following chief complaint(s):  Diabetes and Cholesterol Problem         ASSESSMENT/PLAN:    1. Mixed hyperlipidemia  Stable on current therapy, will check LFTs. - Comprehensive Metabolic Panel; Future    2. PAF (paroxysmal atrial fibrillation) (HCC)  Stable, continue current regimen. RRR, FU With cardiology as planned. - CBC with Auto Differential; Future    3. Controlled type 2 diabetes mellitus without complication, without long-term current use of insulin (HCC)  Stable, check A1C and renal function. - Comprehensive Metabolic Panel; Future  - Hemoglobin A1C; Future    4. Essential (primary) hypertension  Continue PRN BB only, could not tolerate being on BB daily and he's generally in rhythm from the flecainide.   - Comprehensive Metabolic Panel; Future    FU 6m    Subjective   SUBJECTIVE/OBJECTIVE:  DM: He has been diet controlled for a while now. Sugars have been well controlled. Hemoglobin A1C   Date Value Ref Range Status   2022 6.0 (H) 4.8 - 5.6 % Final     Lab Results   Component Value Date/Time     2022 11:03 AM    K 4.1 2022 11:03 AM     2022 11:03 AM    CO2 26 2022 11:03 AM    BUN 9 2022 11:03 AM    CREATININE 1.10 2022 11:03 AM    GLUCOSE 130 2022 11:03 AM    CALCIUM 8.6 2022 11:03 AM    LABGLOM >60 2022 11:03 AM           Vertigo: Never goes away, managed with meclizine for decades. Has has full workup by ENT previously. States it is not bothering him as bad as last time. HLD: He is on chronic rosuvastatin 10mg. Tolerates this well.   Lab Results   Component Value Date    ALT 24 08/01/2022    AST 15 08/01/2022    ALKPHOS 90 08/01/2022    BILITOT 0.6 08/01/2022         Lab Results   Component Value Date    LDLCALC 60 08/01/2022          HTN:  Also has AFib, is managed by cardiology, is on Eliquis, Flecainide, Metoprolol 25 x 1/2 XL. BP is well controlled. He states he has not been taking the BB, using it as pill in the pocket at this point. BP Readings from Last 3 Encounters:   01/30/23 118/80   12/29/22 (!) 152/82   11/21/22 124/72     Lab Results   Component Value Date    TSH 1.630 05/09/2022    IDR6XEG 2.830 08/01/2022             Review of Systems   Constitutional:  Negative for chills and fever. Respiratory:  Negative for chest tightness and shortness of breath. Gastrointestinal:  Negative for abdominal pain and blood in stool. Genitourinary:  Negative for hematuria. Objective   Physical Exam  Vitals and nursing note reviewed. Constitutional:       General: He is not in acute distress. Appearance: Normal appearance. He is not ill-appearing. HENT:      Head: Normocephalic and atraumatic. Right Ear: External ear normal.      Left Ear: External ear normal.      Mouth/Throat:      Mouth: Mucous membranes are moist.   Eyes:      General: No scleral icterus. Right eye: No discharge. Left eye: No discharge. Extraocular Movements: Extraocular movements intact. Pupils: Pupils are equal, round, and reactive to light. Cardiovascular:      Rate and Rhythm: Normal rate and regular rhythm. Pulses: Normal pulses. Heart sounds: No murmur heard. No friction rub. No gallop. Pulmonary:      Effort: Pulmonary effort is normal. No respiratory distress. Breath sounds: Normal breath sounds. Abdominal:      General: Abdomen is flat. Bowel sounds are normal.      Palpations: Abdomen is soft. Comments: Obese   Musculoskeletal:         General: No swelling or tenderness. Normal range of motion.       Cervical back: Normal range of motion and neck supple. No rigidity. Right lower leg: No edema. Left lower leg: No edema. Skin:     General: Skin is warm and dry. Coloration: Skin is not pale. Neurological:      General: No focal deficit present. Mental Status: He is alert and oriented to person, place, and time. Mental status is at baseline. Psychiatric:         Mood and Affect: Mood normal.         Behavior: Behavior normal.         Thought Content: Thought content normal.                An electronic signature was used to authenticate this note.     --Vanessa Lazo MD

## 2023-02-03 ENCOUNTER — OFFICE VISIT (OUTPATIENT)
Dept: CARDIOLOGY CLINIC | Age: 78
End: 2023-02-03
Payer: MEDICARE

## 2023-02-03 VITALS
HEART RATE: 78 BPM | WEIGHT: 231 LBS | DIASTOLIC BLOOD PRESSURE: 78 MMHG | HEIGHT: 69 IN | BODY MASS INDEX: 34.21 KG/M2 | SYSTOLIC BLOOD PRESSURE: 134 MMHG

## 2023-02-03 DIAGNOSIS — I48.0 PAF (PAROXYSMAL ATRIAL FIBRILLATION) (HCC): Primary | ICD-10-CM

## 2023-02-03 DIAGNOSIS — I10 ESSENTIAL (PRIMARY) HYPERTENSION: ICD-10-CM

## 2023-02-03 PROCEDURE — G8427 DOCREV CUR MEDS BY ELIG CLIN: HCPCS | Performed by: INTERNAL MEDICINE

## 2023-02-03 PROCEDURE — 1123F ACP DISCUSS/DSCN MKR DOCD: CPT | Performed by: INTERNAL MEDICINE

## 2023-02-03 PROCEDURE — 3078F DIAST BP <80 MM HG: CPT | Performed by: INTERNAL MEDICINE

## 2023-02-03 PROCEDURE — 99214 OFFICE O/P EST MOD 30 MIN: CPT | Performed by: INTERNAL MEDICINE

## 2023-02-03 PROCEDURE — G8417 CALC BMI ABV UP PARAM F/U: HCPCS | Performed by: INTERNAL MEDICINE

## 2023-02-03 PROCEDURE — 93000 ELECTROCARDIOGRAM COMPLETE: CPT | Performed by: INTERNAL MEDICINE

## 2023-02-03 PROCEDURE — 3075F SYST BP GE 130 - 139MM HG: CPT | Performed by: INTERNAL MEDICINE

## 2023-02-03 PROCEDURE — G8484 FLU IMMUNIZE NO ADMIN: HCPCS | Performed by: INTERNAL MEDICINE

## 2023-02-03 PROCEDURE — 1036F TOBACCO NON-USER: CPT | Performed by: INTERNAL MEDICINE

## 2023-02-03 NOTE — PROGRESS NOTES
[address]  PHONE: [phone]  1945    SUBJECTIVE:   Kenna Pfeiffer is a 69 y.o. male seen for a follow up visit regarding the following:     Chief Complaint   Patient presents with    Atrial Fibrillation    6 Month Follow-Up       HPI:    Kenna Pfeiffer is a very pleasant 69 y.o. male with a past medical and cardiac history significant for HLP, no prior cardiac problems, DM type 2 diet controlled, no prior CVA, and pAF and presents for follow up. Pt presents back again today and is doing well on flecainide. He has been feeling well, no chest pain, SOB, palpitations, or known AF recurrence. Cardiac PMH: (Old records have been reviewed and summarized below)   TTE (12/18): EF 50-55%, mild LAE      Reviewed office note Dr. Darlene Gomez 1/30/23    Past Medical History, Past Surgical History, Family history, Social History, and Medications were all reviewed with the patient today and updated as necessary. Current Outpatient Medications   Medication Sig Dispense Refill    rosuvastatin (CRESTOR) 10 MG tablet Take 1 tablet by mouth daily TAKE 1 TABLET BY MOUTH EVERY DAY 90 tablet 1    apixaban (ELIQUIS) 5 MG TABS tablet Take 1 tablet by mouth in the morning and 1 tablet in the evening. 180 tablet 3    flecainide (TAMBOCOR) 100 MG tablet Take 1 tablet by mouth 2 times daily TAKE 1 TABLET BY MOUTH TWICE A  tablet 3    tamsulosin (FLOMAX) 0.4 MG capsule Take 1 capsule by mouth daily 90 capsule 1    aspirin 81 MG EC tablet Take 81 mg by mouth daily      Flaxseed, Linseed, (FLAX SEED OIL) 1000 MG CAPS Take 1 capsule by mouth daily      metoprolol succinate (TOPROL XL) 25 MG extended release tablet Take 12.5 mg by mouth in the morning. Indications: ONLY TAKING PRN. No current facility-administered medications for this visit.      Allergies   Allergen Reactions    Penicillins Other (See Comments)    Amoxicillin Rash     [unfilled]  [facility] History:   Diagnosis Date    Allergic rhinitis     takes allegra    Arrhythmia     pt states \"heart will skip a beat\"    Arthritis of right knee 6/2/2020    Controlled type 2 diabetes mellitus without complication, without long-term current use of insulin ([facility]) 3/2/2017    GERD (gastroesophageal reflux disease)     Hearing loss of both ears     pt states left ear worse than right    Heel pain, chronic, right 9/7/2018    Hypercholesterolemia     managed w/med    Laceration of left hand without foreign body 12/3/2019    Obesity (BMI 30.0-34.9) 9/19/2017    Perforation of left tympanic membrane     Primary hypertension 2/1/2022    SCC (squamous cell carcinoma) 1/6/2020    Sleep apnea     pt states not able to tolerate cpap    Vertigo, benign paroxysmal      Past Surgical History:   Procedure Laterality Date    COLONOSCOPY      Clear, 10 year repeat 2025    HEENT      L ear tympanoplasty - N[facility]i    ORTHOPEDIC SURGERY Left 2005    thumb tendon repair     Family History   Problem Relation Age of Onset    Stroke Father     Alzheimer's Disease Mother     Diabetes Brother      Social History     Tobacco Use    Smoking status: Never    Smokeless tobacco: Never   Substance Use Topics    Alcohol use: No       PHYSICAL EXAM:    /78   Pulse 78   Ht 5' 9\" (1.753 m)   Wt 231 lb (104.8 kg)   BMI 34.11 kg/m²      Wt Readings from Last 5 Encounters:   02/03/23 231 lb (104.8 kg)   01/30/23 233 lb (105.7 kg)   12/29/22 233 lb 12.8 oz (106.1 kg)   11/21/22 233 lb (105.7 kg)   10/03/22 231 lb 6.4 oz (105 kg)       BP Readings from Last 5 Encounters:   02/03/23 134/78   01/30/23 118/80   12/29/22 (!) 152/82   11/21/22 124/72   10/03/22 130/80       General appearance: Alert, well appearing, and in no distress   CV: RRR, no M/R/G, no JVD, normal distal pulses, no lower extremity edema  Pulm: Clear to auscultation, no wheezes, no crackles, no accessory muscle use  GI: Soft, nontender, nondistended  Neuro: Alert and oriented    Medical problems and test results were reviewed with the patient today. Lab Results   Component Value Date/Time    K 4.3 01/30/2023 09:44 AM     Creatinine   Date Value Ref Range Status   01/30/2023 1.00 0.8 - 1.5 MG/DL Final     Lab Results   Component Value Date/Time    HGB 14.7 01/30/2023 09:44 AM     No results found for: INR, PTMR, PT1    Lab Results   Component Value Date    WBC 7.2 01/30/2023    HGB 14.7 01/30/2023    HCT 45.0 01/30/2023    MCV 91.1 01/30/2023     01/30/2023      Lab Results   Component Value Date/Time     01/30/2023 09:44 AM    K 4.3 01/30/2023 09:44 AM     01/30/2023 09:44 AM    CO2 26 01/30/2023 09:44 AM    BUN 10 01/30/2023 09:44 AM    CREATININE 1.00 01/30/2023 09:44 AM    GLUCOSE 136 01/30/2023 09:44 AM    CALCIUM 9.1 01/30/2023 09:44 AM         EKG: (EKG has been independently visualized by me with interpretation below)  Sinus  Rhythm, rate 78   -Left axis -anterior fascicular block. Camelia Verdugo was seen today for atrial fibrillation and 6 month follow-up. Diagnoses and all orders for this visit:    PAF (paroxysmal atrial fibrillation) ([address]  -     EKG 12 Lead    Essential (primary) hypertension      ASSESSMENT and PLAN   1. Atrial fibrillation and atrial flutter:  Pt wants to continue flecainide at this time. Will call with worsening  palpitations. --flecainide 100mg BID        --metoprolol 12.5mg        2. CVA protection:  eliquis 5mg Q12H, no bleeding problems      3. Flecainide: EKG stable, cont current dose      4. ABDIRAHMAN: untreated, recommended sleep consultation, PT does not want to pursue    Patient has been instructed and agrees to call our office with any issues or other concerns related to their cardiac condition(s) and/or complaint(s). Return in about 6 months (around 8/3/2023). Nicole Parra MD, MS  Cardiology/Electrophysiology  2/3/2023  10:14 AM

## 2023-03-13 ENCOUNTER — OFFICE VISIT (OUTPATIENT)
Dept: FAMILY MEDICINE CLINIC | Facility: CLINIC | Age: 78
End: 2023-03-13
Payer: MEDICARE

## 2023-03-13 VITALS
SYSTOLIC BLOOD PRESSURE: 117 MMHG | BODY MASS INDEX: 34.36 KG/M2 | HEIGHT: 69 IN | WEIGHT: 232 LBS | DIASTOLIC BLOOD PRESSURE: 76 MMHG

## 2023-03-13 DIAGNOSIS — I48.0 PAF (PAROXYSMAL ATRIAL FIBRILLATION) (HCC): Primary | ICD-10-CM

## 2023-03-13 DIAGNOSIS — J02.9 ACUTE PHARYNGITIS, UNSPECIFIED ETIOLOGY: ICD-10-CM

## 2023-03-13 DIAGNOSIS — H65.113 ACUTE MUCOID OTITIS MEDIA OF BOTH EARS: ICD-10-CM

## 2023-03-13 PROBLEM — H65.193 ACUTE MUCOID OTITIS MEDIA OF BOTH EARS: Status: ACTIVE | Noted: 2019-10-25

## 2023-03-13 PROCEDURE — 3078F DIAST BP <80 MM HG: CPT | Performed by: FAMILY MEDICINE

## 2023-03-13 PROCEDURE — 1123F ACP DISCUSS/DSCN MKR DOCD: CPT | Performed by: FAMILY MEDICINE

## 2023-03-13 PROCEDURE — G8417 CALC BMI ABV UP PARAM F/U: HCPCS | Performed by: FAMILY MEDICINE

## 2023-03-13 PROCEDURE — 99214 OFFICE O/P EST MOD 30 MIN: CPT | Performed by: FAMILY MEDICINE

## 2023-03-13 PROCEDURE — 3074F SYST BP LT 130 MM HG: CPT | Performed by: FAMILY MEDICINE

## 2023-03-13 PROCEDURE — G8427 DOCREV CUR MEDS BY ELIG CLIN: HCPCS | Performed by: FAMILY MEDICINE

## 2023-03-13 PROCEDURE — G8484 FLU IMMUNIZE NO ADMIN: HCPCS | Performed by: FAMILY MEDICINE

## 2023-03-13 PROCEDURE — 1036F TOBACCO NON-USER: CPT | Performed by: FAMILY MEDICINE

## 2023-03-13 RX ORDER — CEFDINIR 300 MG/1
300 CAPSULE ORAL 2 TIMES DAILY
Qty: 20 CAPSULE | Refills: 0 | Status: SHIPPED | OUTPATIENT
Start: 2023-03-13 | End: 2023-03-23

## 2023-03-13 ASSESSMENT — ENCOUNTER SYMPTOMS
BLOOD IN STOOL: 0
RHINORRHEA: 1
SHORTNESS OF BREATH: 0
CHEST TIGHTNESS: 0
SINUS PAIN: 1
ABDOMINAL PAIN: 0

## 2023-03-13 ASSESSMENT — PATIENT HEALTH QUESTIONNAIRE - PHQ9
SUM OF ALL RESPONSES TO PHQ9 QUESTIONS 1 & 2: 0
SUM OF ALL RESPONSES TO PHQ QUESTIONS 1-9: 0
2. FEELING DOWN, DEPRESSED OR HOPELESS: 0
SUM OF ALL RESPONSES TO PHQ QUESTIONS 1-9: 0
1. LITTLE INTEREST OR PLEASURE IN DOING THINGS: 0

## 2023-03-13 NOTE — PROGRESS NOTES
[place]  _______________________________________  MD Ammon Villatoro, DO Molina Torres, MD Karley Murphy MD    [address], [address] [place] [address] Avenue  Phone: [phone]  Fax: [phone]    Kenna Pfeiffer (:  1945) is a 69 y.o. male,Established patient, here for evaluation of the following chief complaint(s):  Dizziness and Ear Fullness (R ear x 2 days )         ASSESSMENT/PLAN:    1. PAF (paroxysmal atrial fibrillation) (HCC)  Stable, continue current regimen. RRR on tambacor    2. Acute mucoid otitis media of both ears  Has cleared up on omnicef before, will try that this time. Take with food. +Probiotic  - cefdinir (OMNICEF) 300 MG capsule; Take 1 capsule by mouth 2 times daily for 10 days  Dispense: 20 capsule; Refill: 0    3. Acute pharyngitis, unspecified etiology  As above. Likely strep. - cefdinir (OMNICEF) 300 MG capsule; Take 1 capsule by mouth 2 times daily for 10 days  Dispense: 20 capsule; Refill: 0      Subjective   SUBJECTIVE/OBJECTIVE:    Pt with Afib and BPPV (the latter which has been severe in the past) is here with 2 days of ear fullness and dizziness. States this feels more like it does when he has an ear infection. In the past has been on flonase for his chronic ETD which gets bad in the spring. Has not been taking that consistently. BP Readings from Last 3 Encounters:   23 117/76   23 134/78   23 118/80     BP is WNL on tambacor and 12.5 of toprol XL. He cannot tolerate more than this. Review of Systems   Constitutional:  Negative for chills and fever. HENT:  Positive for ear pain, postnasal drip, rhinorrhea and sinus pain. Respiratory:  Negative for chest tightness and shortness of breath. Gastrointestinal:  Negative for abdominal pain and blood in stool. Genitourinary:  Negative for hematuria. Neurological:  Positive for dizziness.         Objective   Physical Exam  Vitals and nursing note reviewed. Constitutional:       General: He is not in acute distress. Appearance: Normal appearance. He is not ill-appearing. HENT:      Head: Normocephalic and atraumatic. Right Ear: External ear normal.      Left Ear: External ear normal.      Ears:      Comments: BL Tms with inferior/crentral suppuration, prominent capillaries     Mouth/Throat:      Mouth: Mucous membranes are moist.      Pharynx: Posterior oropharyngeal erythema present. Comments: Posterior pharynx is red, irrirtated  Eyes:      General: No scleral icterus. Right eye: No discharge. Left eye: No discharge. Extraocular Movements: Extraocular movements intact. Pupils: Pupils are equal, round, and reactive to light. Cardiovascular:      Rate and Rhythm: Normal rate and regular rhythm. Pulses: Normal pulses. Heart sounds: No murmur heard. No friction rub. No gallop. Pulmonary:      Effort: Pulmonary effort is normal. No respiratory distress. Breath sounds: Normal breath sounds. Abdominal:      General: Abdomen is flat. Bowel sounds are normal.      Palpations: Abdomen is soft. Musculoskeletal:         General: No swelling or tenderness. Normal range of motion. Cervical back: Normal range of motion and neck supple. No rigidity. Right lower leg: No edema. Left lower leg: No edema. Lymphadenopathy:      Cervical: Cervical adenopathy present. Skin:     General: Skin is warm and dry. Coloration: Skin is not pale. Neurological:      General: No focal deficit present. Mental Status: He is alert and oriented to person, place, and time. Mental status is at baseline. Psychiatric:         Mood and Affect: Mood normal.         Behavior: Behavior normal.         Thought Content: Thought content normal.                An electronic signature was used to authenticate this note.     --Donald Garcia MD

## 2023-05-18 ENCOUNTER — TELEMEDICINE (OUTPATIENT)
Dept: FAMILY MEDICINE CLINIC | Facility: CLINIC | Age: 78
End: 2023-05-18
Payer: MEDICARE

## 2023-05-18 DIAGNOSIS — R42 VERTIGO: Primary | ICD-10-CM

## 2023-05-18 DIAGNOSIS — H69.83 DYSFUNCTION OF BOTH EUSTACHIAN TUBES: ICD-10-CM

## 2023-05-18 DIAGNOSIS — J30.9 ALLERGIC RHINITIS, UNSPECIFIED SEASONALITY, UNSPECIFIED TRIGGER: ICD-10-CM

## 2023-05-18 DIAGNOSIS — H65.06 RECURRENT ACUTE SEROUS OTITIS MEDIA OF BOTH EARS: ICD-10-CM

## 2023-05-18 PROBLEM — H66.93 RECURRENT OTITIS MEDIA OF BOTH EARS: Status: ACTIVE | Noted: 2020-06-02

## 2023-05-18 PROCEDURE — 99214 OFFICE O/P EST MOD 30 MIN: CPT | Performed by: FAMILY MEDICINE

## 2023-05-18 PROCEDURE — G8427 DOCREV CUR MEDS BY ELIG CLIN: HCPCS | Performed by: FAMILY MEDICINE

## 2023-05-18 PROCEDURE — 1123F ACP DISCUSS/DSCN MKR DOCD: CPT | Performed by: FAMILY MEDICINE

## 2023-05-18 RX ORDER — MECLIZINE HCL 12.5 MG/1
12.5 TABLET ORAL 3 TIMES DAILY PRN
Qty: 30 TABLET | Refills: 0 | Status: SHIPPED | OUTPATIENT
Start: 2023-05-18 | End: 2023-05-28

## 2023-05-18 RX ORDER — CEFDINIR 300 MG/1
300 CAPSULE ORAL 2 TIMES DAILY
Qty: 20 CAPSULE | Refills: 0 | Status: SHIPPED | OUTPATIENT
Start: 2023-05-18 | End: 2023-05-28

## 2023-05-18 RX ORDER — PREDNISONE 10 MG/1
TABLET ORAL
Qty: 18 TABLET | Refills: 0 | Status: SHIPPED | OUTPATIENT
Start: 2023-05-18 | End: 2023-05-28

## 2023-05-18 SDOH — ECONOMIC STABILITY: HOUSING INSECURITY
IN THE LAST 12 MONTHS, WAS THERE A TIME WHEN YOU DID NOT HAVE A STEADY PLACE TO SLEEP OR SLEPT IN A SHELTER (INCLUDING NOW)?: NO

## 2023-05-18 SDOH — ECONOMIC STABILITY: FOOD INSECURITY: WITHIN THE PAST 12 MONTHS, YOU WORRIED THAT YOUR FOOD WOULD RUN OUT BEFORE YOU GOT MONEY TO BUY MORE.: NEVER TRUE

## 2023-05-18 SDOH — ECONOMIC STABILITY: INCOME INSECURITY: HOW HARD IS IT FOR YOU TO PAY FOR THE VERY BASICS LIKE FOOD, HOUSING, MEDICAL CARE, AND HEATING?: NOT HARD AT ALL

## 2023-05-18 SDOH — ECONOMIC STABILITY: FOOD INSECURITY: WITHIN THE PAST 12 MONTHS, THE FOOD YOU BOUGHT JUST DIDN'T LAST AND YOU DIDN'T HAVE MONEY TO GET MORE.: NEVER TRUE

## 2023-05-18 NOTE — PATIENT INSTRUCTIONS
Please finish the full course of antibiotic prescribed for you; not finishing the full course will increase the risk of developing an antibiotic resistant infection that will become more difficult to treat. - Please know that we keep Urgent Care visit slots in reserve every day for any acute illness or injury. If you ever have an urgent issue please call our office and we should typically be able to see you within 24 hours, but most often you will be able to be seen the same day that you call. We also offer Virtual Visits that can be done over a video connection, or regular phone call if you don't have a video connection, if that is your preference vs an office visit. Finally, please make sure you are scheduling your visits with someone who works at our office, [employer], and not scheduling with our \"call center\". When you get the phone tree options, press \"Option 2\" first & then \"Option 1\". This combination should take you directly to someone at our office. Please try to avoid scheduling any visits through our call center if at all possible.

## 2023-05-18 NOTE — PROGRESS NOTES
[address]  Phone: [phone]  Email: [phone] Shannan; Established patient 69 y. o.male; seen 5/18/2023 for: Dizziness (Pt states he has had vertigo in the past) and Nausea      Assessment & Plan    1. Vertigo  -     predniSONE (DELTASONE) 10 MG tablet; 3 pills/day X 3 days, then 2 pills/day X 3 days, then 1 pill/day X 2 days, then 1/2 pill/day X 2 days, Disp-18 tablet, R-0Normal  -     cefdinir (OMNICEF) 300 MG capsule; Take 1 capsule by mouth 2 times daily for 10 days, Disp-20 capsule, R-0Normal  -     meclizine (ANTIVERT) 12.5 MG tablet; Take 1 tablet by mouth 3 times daily as needed for Dizziness or Nausea, Disp-30 tablet, R-0Normal  2. Dysfunction of both eustachian tubes  -     predniSONE (DELTASONE) 10 MG tablet; 3 pills/day X 3 days, then 2 pills/day X 3 days, then 1 pill/day X 2 days, then 1/2 pill/day X 2 days, Disp-18 tablet, R-0Normal  -     cefdinir (OMNICEF) 300 MG capsule; Take 1 capsule by mouth 2 times daily for 10 days, Disp-20 capsule, R-0Normal  -     meclizine (ANTIVERT) 12.5 MG tablet; Take 1 tablet by mouth 3 times daily as needed for Dizziness or Nausea, Disp-30 tablet, R-0Normal  3. Allergic rhinitis, unspecified seasonality, unspecified trigger  -     predniSONE (DELTASONE) 10 MG tablet; 3 pills/day X 3 days, then 2 pills/day X 3 days, then 1 pill/day X 2 days, then 1/2 pill/day X 2 days, Disp-18 tablet, R-0Normal  -     cefdinir (OMNICEF) 300 MG capsule; Take 1 capsule by mouth 2 times daily for 10 days, Disp-20 capsule, R-0Normal  -     meclizine (ANTIVERT) 12.5 MG tablet; Take 1 tablet by mouth 3 times daily as needed for Dizziness or Nausea, Disp-30 tablet, R-0Normal  4. Recurrent acute serous otitis media of both ears    Problem and/or Symptoms are currently not stable and/or well controlled on current treatment plan.  Will have patient follow up as directed and make the

## 2023-06-20 ENCOUNTER — OFFICE VISIT (OUTPATIENT)
Dept: FAMILY MEDICINE CLINIC | Facility: CLINIC | Age: 78
End: 2023-06-20
Payer: MEDICARE

## 2023-06-20 VITALS
SYSTOLIC BLOOD PRESSURE: 135 MMHG | DIASTOLIC BLOOD PRESSURE: 78 MMHG | HEIGHT: 70 IN | BODY MASS INDEX: 32.93 KG/M2 | WEIGHT: 230 LBS

## 2023-06-20 DIAGNOSIS — H81.13 BENIGN PAROXYSMAL POSITIONAL VERTIGO DUE TO BILATERAL VESTIBULAR DISORDER: ICD-10-CM

## 2023-06-20 DIAGNOSIS — H66.006 RECURRENT ACUTE SUPPURATIVE OTITIS MEDIA WITHOUT SPONTANEOUS RUPTURE OF TYMPANIC MEMBRANE OF BOTH SIDES: Primary | ICD-10-CM

## 2023-06-20 PROCEDURE — 1036F TOBACCO NON-USER: CPT | Performed by: FAMILY MEDICINE

## 2023-06-20 PROCEDURE — G8427 DOCREV CUR MEDS BY ELIG CLIN: HCPCS | Performed by: FAMILY MEDICINE

## 2023-06-20 PROCEDURE — 3075F SYST BP GE 130 - 139MM HG: CPT | Performed by: FAMILY MEDICINE

## 2023-06-20 PROCEDURE — 3078F DIAST BP <80 MM HG: CPT | Performed by: FAMILY MEDICINE

## 2023-06-20 PROCEDURE — 1123F ACP DISCUSS/DSCN MKR DOCD: CPT | Performed by: FAMILY MEDICINE

## 2023-06-20 PROCEDURE — 99214 OFFICE O/P EST MOD 30 MIN: CPT | Performed by: FAMILY MEDICINE

## 2023-06-20 PROCEDURE — G8417 CALC BMI ABV UP PARAM F/U: HCPCS | Performed by: FAMILY MEDICINE

## 2023-06-20 RX ORDER — MECLIZINE HCL 12.5 MG/1
12.5 TABLET ORAL 3 TIMES DAILY PRN
Qty: 30 TABLET | Refills: 0 | Status: SHIPPED | OUTPATIENT
Start: 2023-06-20 | End: 2023-06-30

## 2023-06-20 RX ORDER — CEFPODOXIME PROXETIL 200 MG/1
200 TABLET, FILM COATED ORAL 2 TIMES DAILY
Qty: 20 TABLET | Refills: 0 | Status: SHIPPED | OUTPATIENT
Start: 2023-06-20 | End: 2023-06-30

## 2023-06-20 ASSESSMENT — PATIENT HEALTH QUESTIONNAIRE - PHQ9
1. LITTLE INTEREST OR PLEASURE IN DOING THINGS: 0
SUM OF ALL RESPONSES TO PHQ9 QUESTIONS 1 & 2: 0
SUM OF ALL RESPONSES TO PHQ QUESTIONS 1-9: 0
2. FEELING DOWN, DEPRESSED OR HOPELESS: 0
SUM OF ALL RESPONSES TO PHQ QUESTIONS 1-9: 0

## 2023-06-20 ASSESSMENT — ENCOUNTER SYMPTOMS
CHEST TIGHTNESS: 0
ABDOMINAL PAIN: 0
SHORTNESS OF BREATH: 0
BLOOD IN STOOL: 0

## 2023-06-20 NOTE — PROGRESS NOTES
[facility]  _______________________________________  MD Emil Johnson NP Honey [place], [place]    [address] [address] , [address] [place] [address]  Phone: [phone]  Fax: [phone]    Ada Duncan (:  1945) is a 69 y.o. male,Established patient, here for evaluation of the following chief complaint(s):  Dizziness and Ear Fullness (R Ear )         ASSESSMENT/PLAN:    1. Recurrent acute suppurative otitis media without spontaneous rupture of tympanic membrane of both sides  Will try Vantin this time. +Probiotic. Encouraged him to keep his appointment with ENT, I think he may need tubes if this is not cleared up by then. - cefpodoxime (VANTIN) 200 MG tablet; Take 1 tablet by mouth 2 times daily for 10 days  Dispense: 20 tablet; Refill: 0    2. Benign paroxysmal positional vertigo due to bilateral vestibular disorder  Reviewed side effects of meclizine, can use in dire need, do not drive after taking.   - meclizine (ANTIVERT) 12.5 MG tablet; Take 1 tablet by mouth 3 times daily as needed for Dizziness or Nausea  Dispense: 30 tablet; Refill: 0    FU as previously scheduled    Subjective   SUBJECTIVE/OBJECTIVE:    Pt who is prone to ear infections is here with similar symptoms, tends to happen fall/spring:    As above, having fullness in the R ear and dizzy (has chronic BPPV which will get aggravated with AOM). Has been going on for a couple days. Has run out of meclizine. He is getting tired of having these infections over and over. Was treated for AOM with cefdinir a month ago. BP Readings from Last 3 Encounters:   23 135/78   23 117/76   23 134/78         Review of Systems   Constitutional:  Negative for chills and fever. HENT:  Positive for ear pain. Respiratory:  Negative for chest tightness and shortness of breath. Gastrointestinal:  Negative for abdominal pain and blood in stool.    Genitourinary:  Negative for

## 2023-06-27 ENCOUNTER — APPOINTMENT (RX ONLY)
Dept: URBAN - METROPOLITAN AREA CLINIC 25 | Facility: CLINIC | Age: 78
Setting detail: DERMATOLOGY
End: 2023-06-27

## 2023-06-27 DIAGNOSIS — L81.4 OTHER MELANIN HYPERPIGMENTATION: ICD-10-CM

## 2023-06-27 DIAGNOSIS — L57.8 OTHER SKIN CHANGES DUE TO CHRONIC EXPOSURE TO NONIONIZING RADIATION: ICD-10-CM

## 2023-06-27 DIAGNOSIS — Z85.828 PERSONAL HISTORY OF OTHER MALIGNANT NEOPLASM OF SKIN: ICD-10-CM

## 2023-06-27 DIAGNOSIS — D18.0 HEMANGIOMA: ICD-10-CM

## 2023-06-27 DIAGNOSIS — D22 MELANOCYTIC NEVI: ICD-10-CM

## 2023-06-27 DIAGNOSIS — L82.1 OTHER SEBORRHEIC KERATOSIS: ICD-10-CM

## 2023-06-27 DIAGNOSIS — L57.0 ACTINIC KERATOSIS: ICD-10-CM

## 2023-06-27 PROBLEM — D18.01 HEMANGIOMA OF SKIN AND SUBCUTANEOUS TISSUE: Status: ACTIVE | Noted: 2023-06-27

## 2023-06-27 PROBLEM — D22.0 MELANOCYTIC NEVI OF LIP: Status: ACTIVE | Noted: 2023-06-27

## 2023-06-27 PROCEDURE — ? COUNSELING

## 2023-06-27 PROCEDURE — 99213 OFFICE O/P EST LOW 20 MIN: CPT | Mod: 25

## 2023-06-27 PROCEDURE — ? LIQUID NITROGEN

## 2023-06-27 PROCEDURE — 17000 DESTRUCT PREMALG LESION: CPT

## 2023-06-27 PROCEDURE — 17003 DESTRUCT PREMALG LES 2-14: CPT

## 2023-06-27 ASSESSMENT — LOCATION SIMPLE DESCRIPTION DERM
LOCATION SIMPLE: RIGHT UPPER BACK
LOCATION SIMPLE: LEFT ACHILLES SKIN
LOCATION SIMPLE: LEFT TEMPLE
LOCATION SIMPLE: LEFT HAND
LOCATION SIMPLE: LEFT FOREHEAD
LOCATION SIMPLE: LEFT UPPER ARM
LOCATION SIMPLE: LEFT EAR
LOCATION SIMPLE: CHEST
LOCATION SIMPLE: RIGHT ANKLE
LOCATION SIMPLE: RIGHT TEMPLE
LOCATION SIMPLE: RIGHT ELBOW
LOCATION SIMPLE: RIGHT LIP
LOCATION SIMPLE: RIGHT ZYGOMA
LOCATION SIMPLE: LEFT CHEEK
LOCATION SIMPLE: RIGHT CHEEK
LOCATION SIMPLE: ABDOMEN

## 2023-06-27 ASSESSMENT — LOCATION DETAILED DESCRIPTION DERM
LOCATION DETAILED: LEFT SUPERIOR LATERAL BUCCAL CHEEK
LOCATION DETAILED: LEFT ACHILLES SKIN
LOCATION DETAILED: RIGHT POSTERIOR LATERAL MALLEOLUS
LOCATION DETAILED: LEFT DISTAL POSTERIOR UPPER ARM
LOCATION DETAILED: LEFT MEDIAL TEMPLE
LOCATION DETAILED: RIGHT INFERIOR MEDIAL UPPER BACK
LOCATION DETAILED: RIGHT MEDIAL ZYGOMA
LOCATION DETAILED: RIGHT MEDIAL SUPERIOR CHEST
LOCATION DETAILED: RIGHT CENTRAL ZYGOMA
LOCATION DETAILED: LEFT INFERIOR LATERAL MALAR CHEEK
LOCATION DETAILED: RIGHT UPPER CUTANEOUS LIP
LOCATION DETAILED: LEFT SUPERIOR FOREHEAD
LOCATION DETAILED: EPIGASTRIC SKIN
LOCATION DETAILED: RIGHT SUPERIOR UPPER BACK
LOCATION DETAILED: RIGHT SUPERIOR PREAURICULAR CHEEK
LOCATION DETAILED: RIGHT MID-UPPER BACK
LOCATION DETAILED: LEFT LATERAL INFERIOR CHEST
LOCATION DETAILED: RIGHT MEDIAL TEMPLE
LOCATION DETAILED: RIGHT ELBOW
LOCATION DETAILED: LEFT ANTIHELIX
LOCATION DETAILED: LEFT FOREHEAD
LOCATION DETAILED: LEFT ULNAR DORSAL HAND
LOCATION DETAILED: LEFT SUPERIOR PREAURICULAR CHEEK

## 2023-06-27 ASSESSMENT — LOCATION ZONE DERM
LOCATION ZONE: TRUNK
LOCATION ZONE: LIP
LOCATION ZONE: ARM
LOCATION ZONE: LEG
LOCATION ZONE: FACE
LOCATION ZONE: HAND
LOCATION ZONE: EAR

## 2023-06-27 NOTE — HPI: EVALUATION OF SKIN LESION(S)
Form completed and sent to Physician's Office electronically via [employer] messaging for review and signature.     Completed form will be faxed to [phone]  
Hpi Title: Evaluation of Skin Lesions
How Severe Are Your Spot(S)?: mild
Have Your Spot(S) Been Treated In The Past?: has not been treated

## 2023-07-10 ENCOUNTER — OFFICE VISIT (OUTPATIENT)
Dept: ENT CLINIC | Age: 78
End: 2023-07-10

## 2023-07-10 VITALS
WEIGHT: 232 LBS | BODY MASS INDEX: 33.21 KG/M2 | HEIGHT: 70 IN | DIASTOLIC BLOOD PRESSURE: 68 MMHG | SYSTOLIC BLOOD PRESSURE: 120 MMHG

## 2023-07-10 DIAGNOSIS — R42 DIZZINESS: ICD-10-CM

## 2023-07-10 DIAGNOSIS — E78.2 MIXED HYPERLIPIDEMIA: ICD-10-CM

## 2023-07-10 DIAGNOSIS — H66.90 RECURRENT AOM (ACUTE OTITIS MEDIA): Primary | ICD-10-CM

## 2023-07-10 DIAGNOSIS — R42 DISEQUILIBRIUM: ICD-10-CM

## 2023-07-10 RX ORDER — ROSUVASTATIN CALCIUM 10 MG/1
10 TABLET, COATED ORAL DAILY
Qty: 90 TABLET | Refills: 1 | Status: SHIPPED | OUTPATIENT
Start: 2023-07-10

## 2023-07-10 ASSESSMENT — ENCOUNTER SYMPTOMS
GASTROINTESTINAL NEGATIVE: 1
RESPIRATORY NEGATIVE: 1
ALLERGIC/IMMUNOLOGIC NEGATIVE: 1
EYES NEGATIVE: 1

## 2023-07-14 ENCOUNTER — HOSPITAL ENCOUNTER (OUTPATIENT)
Dept: PHYSICAL THERAPY | Age: 78
Setting detail: RECURRING SERIES
Discharge: HOME | End: 2023-07-17
Attending: STUDENT IN AN ORGANIZED HEALTH CARE EDUCATION/TRAINING PROGRAM
Payer: MEDICARE

## 2023-07-14 PROCEDURE — 97161 PT EVAL LOW COMPLEX 20 MIN: CPT

## 2023-07-31 ENCOUNTER — OFFICE VISIT (OUTPATIENT)
Dept: FAMILY MEDICINE CLINIC | Facility: CLINIC | Age: 78
End: 2023-07-31
Payer: MEDICARE

## 2023-07-31 VITALS
DIASTOLIC BLOOD PRESSURE: 80 MMHG | BODY MASS INDEX: 34.36 KG/M2 | WEIGHT: 232 LBS | HEIGHT: 69 IN | SYSTOLIC BLOOD PRESSURE: 125 MMHG

## 2023-07-31 DIAGNOSIS — E78.2 MIXED HYPERLIPIDEMIA: ICD-10-CM

## 2023-07-31 DIAGNOSIS — I48.0 PAF (PAROXYSMAL ATRIAL FIBRILLATION) (HCC): ICD-10-CM

## 2023-07-31 DIAGNOSIS — E11.9 CONTROLLED TYPE 2 DIABETES MELLITUS WITHOUT COMPLICATION, WITHOUT LONG-TERM CURRENT USE OF INSULIN (HCC): ICD-10-CM

## 2023-07-31 DIAGNOSIS — I48.0 PAF (PAROXYSMAL ATRIAL FIBRILLATION) (HCC): Primary | ICD-10-CM

## 2023-07-31 DIAGNOSIS — H69.83 DYSFUNCTION OF BOTH EUSTACHIAN TUBES: ICD-10-CM

## 2023-07-31 DIAGNOSIS — H81.13 BENIGN PAROXYSMAL POSITIONAL VERTIGO DUE TO BILATERAL VESTIBULAR DISORDER: ICD-10-CM

## 2023-07-31 DIAGNOSIS — I10 ESSENTIAL (PRIMARY) HYPERTENSION: ICD-10-CM

## 2023-07-31 LAB
ALBUMIN SERPL-MCNC: 3.9 G/DL (ref 3.2–4.6)
ALBUMIN/GLOB SERPL: 1.3 (ref 0.4–1.6)
ALP SERPL-CCNC: 76 U/L (ref 50–136)
ALT SERPL-CCNC: 30 U/L (ref 12–65)
ANION GAP SERPL CALC-SCNC: 5 MMOL/L (ref 2–11)
AST SERPL-CCNC: 19 U/L (ref 15–37)
BASOPHILS # BLD: 0 K/UL (ref 0–0.2)
BASOPHILS NFR BLD: 1 % (ref 0–2)
BILIRUB SERPL-MCNC: 0.7 MG/DL (ref 0.2–1.1)
BUN SERPL-MCNC: 11 MG/DL (ref 8–23)
CALCIUM SERPL-MCNC: 9.3 MG/DL (ref 8.3–10.4)
CHLORIDE SERPL-SCNC: 108 MMOL/L (ref 101–110)
CHOLEST SERPL-MCNC: 97 MG/DL
CO2 SERPL-SCNC: 27 MMOL/L (ref 21–32)
CREAT SERPL-MCNC: 1.1 MG/DL (ref 0.8–1.5)
DIFFERENTIAL METHOD BLD: NORMAL
EOSINOPHIL # BLD: 0.1 K/UL (ref 0–0.8)
EOSINOPHIL NFR BLD: 3 % (ref 0.5–7.8)
ERYTHROCYTE [DISTWIDTH] IN BLOOD BY AUTOMATED COUNT: 13.9 % (ref 11.9–14.6)
GLOBULIN SER CALC-MCNC: 3 G/DL (ref 2.8–4.5)
GLUCOSE SERPL-MCNC: 123 MG/DL (ref 65–100)
HCT VFR BLD AUTO: 44.3 % (ref 41.1–50.3)
HDLC SERPL-MCNC: 34 MG/DL (ref 40–60)
HDLC SERPL: 2.9
HGB BLD-MCNC: 14.7 G/DL (ref 13.6–17.2)
IMM GRANULOCYTES # BLD AUTO: 0 K/UL (ref 0–0.5)
IMM GRANULOCYTES NFR BLD AUTO: 0 % (ref 0–5)
LDLC SERPL CALC-MCNC: 35.2 MG/DL
LYMPHOCYTES # BLD: 1.2 K/UL (ref 0.5–4.6)
LYMPHOCYTES NFR BLD: 22 % (ref 13–44)
MCH RBC QN AUTO: 30.1 PG (ref 26.1–32.9)
MCHC RBC AUTO-ENTMCNC: 33.2 G/DL (ref 31.4–35)
MCV RBC AUTO: 90.8 FL (ref 82–102)
MONOCYTES # BLD: 0.4 K/UL (ref 0.1–1.3)
MONOCYTES NFR BLD: 8 % (ref 4–12)
NEUTS SEG # BLD: 3.6 K/UL (ref 1.7–8.2)
NEUTS SEG NFR BLD: 66 % (ref 43–78)
NRBC # BLD: 0 K/UL (ref 0–0.2)
PLATELET # BLD AUTO: 165 K/UL (ref 150–450)
PMV BLD AUTO: 11.9 FL (ref 9.4–12.3)
POTASSIUM SERPL-SCNC: 4 MMOL/L (ref 3.5–5.1)
PROT SERPL-MCNC: 6.9 G/DL (ref 6.3–8.2)
RBC # BLD AUTO: 4.88 M/UL (ref 4.23–5.6)
SODIUM SERPL-SCNC: 140 MMOL/L (ref 133–143)
TRIGL SERPL-MCNC: 139 MG/DL (ref 35–150)
TSH, 3RD GENERATION: 2.44 UIU/ML (ref 0.36–3.74)
VLDLC SERPL CALC-MCNC: 27.8 MG/DL (ref 6–23)
WBC # BLD AUTO: 5.4 K/UL (ref 4.3–11.1)

## 2023-07-31 PROCEDURE — G8417 CALC BMI ABV UP PARAM F/U: HCPCS | Performed by: FAMILY MEDICINE

## 2023-07-31 PROCEDURE — 1123F ACP DISCUSS/DSCN MKR DOCD: CPT | Performed by: FAMILY MEDICINE

## 2023-07-31 PROCEDURE — G8427 DOCREV CUR MEDS BY ELIG CLIN: HCPCS | Performed by: FAMILY MEDICINE

## 2023-07-31 PROCEDURE — 3074F SYST BP LT 130 MM HG: CPT | Performed by: FAMILY MEDICINE

## 2023-07-31 PROCEDURE — 3044F HG A1C LEVEL LT 7.0%: CPT | Performed by: FAMILY MEDICINE

## 2023-07-31 PROCEDURE — 3079F DIAST BP 80-89 MM HG: CPT | Performed by: FAMILY MEDICINE

## 2023-07-31 PROCEDURE — 1036F TOBACCO NON-USER: CPT | Performed by: FAMILY MEDICINE

## 2023-07-31 PROCEDURE — 99214 OFFICE O/P EST MOD 30 MIN: CPT | Performed by: FAMILY MEDICINE

## 2023-07-31 ASSESSMENT — ENCOUNTER SYMPTOMS
CHEST TIGHTNESS: 0
SHORTNESS OF BREATH: 0
BLOOD IN STOOL: 0
ABDOMINAL PAIN: 0

## 2023-07-31 NOTE — PROGRESS NOTES
[facility]  _______________________________________  MD Larissa Cancino, DO  Madi Michael, MD Ellie Ron, [address] [place] [address], [address]  Phone: [phone]  Fax: [phone]    Di Maria (:  1945) is a 69 y.o. male,Established patient, here for evaluation of the following chief complaint(s):  Diabetes, Hypertension, Cholesterol Problem, and Dizziness (Worse when he wakes up in the morning or lays down at night )         ASSESSMENT/PLAN:    1. PAF (paroxysmal atrial fibrillation) (HCC)  Stable, continue current regimen. RRR today. - Comprehensive Metabolic Panel; Future  - CBC with Auto Differential; Future  - TSH; Future    2. Essential (primary) hypertension  Stable. Continue current regimen, check renal function. - Comprehensive Metabolic Panel; Future  - TSH; Future    3. Controlled type 2 diabetes mellitus without complication, without long-term current use of insulin (HCC)  Stable, check A1C and renal function. - Comprehensive Metabolic Panel; Future  - Hemoglobin A1C; Future    4. Dysfunction of both eustachian tubes  Seems to be in remission today. 5. Benign paroxysmal positional vertigo due to bilateral vestibular disorder  Will ask for second opinion, Dr. Arian Ding ENT. If there's nothing else to do, Mr. Swati Reyes is ready to accept that after this consult. - External Referral To ENT    6. Mixed hyperlipidemia  Stable on current therapy, will check LFTs. - Comprehensive Metabolic Panel; Future  - Lipid Panel; Future    FU 4w AWV  FU 6m    Subjective   SUBJECTIVE/OBJECTIVE:  DM: He has been diet controlled for a while now. Sugars have been well controlled.    Hemoglobin A1C   Date Value Ref Range Status   2023 6.2 (H) 4.8 - 5.6 % Final     Lab Results   Component Value Date/Time     2023 09:44 AM    K 4.3 2023 09:44 AM     2023 09:44 AM    CO2 26 2023

## 2023-08-01 LAB
EST. AVERAGE GLUCOSE BLD GHB EST-MCNC: 128 MG/DL
HBA1C MFR BLD: 6.1 % (ref 4.8–5.6)

## 2023-08-10 ENCOUNTER — TELEPHONE (OUTPATIENT)
Dept: FAMILY MEDICINE CLINIC | Facility: CLINIC | Age: 78
End: 2023-08-10

## 2023-08-10 NOTE — TELEPHONE ENCOUNTER
Pt called requesting to leave a message to the provider. Mrs Corrinne Potash stated he had some concern of his heart rate going up yesterday due to his Hx of Afib. Pt stated yesterday morning he had rapid heart rate and it lasted mostly the entire day. During the morning his heart rate was 130. Pt took his medication and it went up and down in fluctuations between lowest being 87 highest 115. Pt took a second dose of his meds and stated only took half the tablet and it drooped down to 50. Mr. Corrinne Potash is concerned if the constant fluctuating heart rate could have caused any issues. He stated he is feeling good today and heart rate is currently 55.

## 2023-08-10 NOTE — TELEPHONE ENCOUNTER
He should call cardiology for an acute appointment. If the heart rate jumps up to 130+ again and stays there for more than a few minutes, hit the ER immediately.

## 2023-08-11 ENCOUNTER — TELEPHONE (OUTPATIENT)
Age: 78
End: 2023-08-11

## 2023-08-11 ENCOUNTER — NURSE ONLY (OUTPATIENT)
Age: 78
End: 2023-08-11
Payer: MEDICARE

## 2023-08-11 DIAGNOSIS — R00.2 HEART PALPITATIONS: Primary | ICD-10-CM

## 2023-08-11 DIAGNOSIS — I48.0 PAF (PAROXYSMAL ATRIAL FIBRILLATION) (HCC): ICD-10-CM

## 2023-08-11 DIAGNOSIS — I10 PRIMARY HYPERTENSION: ICD-10-CM

## 2023-08-11 PROCEDURE — 93000 ELECTROCARDIOGRAM COMPLETE: CPT | Performed by: INTERNAL MEDICINE

## 2023-08-11 NOTE — PROGRESS NOTES
Took half pill 4 times on Wednesday to get HR to come down from 130. After the fourth one it went down to 50.         EKG reviewed by Dr. Calvin Qureshi who stated looked normal.

## 2023-08-11 NOTE — TELEPHONE ENCOUNTER
Pt c/o elevated HR since Weds. States was up to 130 on Weds. Took 1/2 metoprolol 25 mg tab. States ended up taking 4-5 1/2 tabs of his metoprolol and HR finally went down into the 50s. States HR today 50s-60s. States he has been checking HR at least 4-5 times a day since Weds. Has seen as high as 170s. Denies any CP, SOB, or dizziness at present.

## 2023-08-11 NOTE — TELEPHONE ENCOUNTER
Per Dr. Ashley Quezada, [facility] to bring in for EKG, monitor today. Appt scheduled today at 3:00. Verb understanding.

## 2023-08-11 NOTE — TELEPHONE ENCOUNTER
Patient called stating his heart rate has been high. He states he is light-headed and has some SOB. No active CP.

## 2023-08-11 NOTE — TELEPHONE ENCOUNTER
Patient called and wants to schedule his 6 mos. Ck up with Dr. Vira Mccrary. He was suppose to LVM on your ext.

## 2023-08-31 ENCOUNTER — TELEMEDICINE (OUTPATIENT)
Dept: FAMILY MEDICINE CLINIC | Facility: CLINIC | Age: 78
End: 2023-08-31
Payer: MEDICARE

## 2023-08-31 DIAGNOSIS — Z00.00 ENCOUNTER FOR ANNUAL WELLNESS EXAM IN MEDICARE PATIENT: Primary | ICD-10-CM

## 2023-08-31 PROCEDURE — G0439 PPPS, SUBSEQ VISIT: HCPCS | Performed by: FAMILY MEDICINE

## 2023-08-31 PROCEDURE — 1123F ACP DISCUSS/DSCN MKR DOCD: CPT | Performed by: FAMILY MEDICINE

## 2023-08-31 ASSESSMENT — PATIENT HEALTH QUESTIONNAIRE - PHQ9
1. LITTLE INTEREST OR PLEASURE IN DOING THINGS: 0
SUM OF ALL RESPONSES TO PHQ9 QUESTIONS 1 & 2: 0
SUM OF ALL RESPONSES TO PHQ QUESTIONS 1-9: 0
2. FEELING DOWN, DEPRESSED OR HOPELESS: 0

## 2023-08-31 ASSESSMENT — LIFESTYLE VARIABLES
HOW OFTEN DO YOU HAVE A DRINK CONTAINING ALCOHOL: NEVER
HOW MANY STANDARD DRINKS CONTAINING ALCOHOL DO YOU HAVE ON A TYPICAL DAY: PATIENT DOES NOT DRINK

## 2023-08-31 NOTE — PATIENT INSTRUCTIONS
dental visit is recommended every 6 months. Try to get at least 150 minutes of exercise per week or 10,000 steps per day on a pedometer . Order or download the FREE \"Exercise & Physical Activity: Your Everyday Guide\" from [employer]. Call 1-621.628.4333 or search [employer] online. You need 4332-0434 mg of calcium and 2004-3361 IU of vitamin D per day. It is possible to meet your calcium requirement with diet alone, but a vitamin D supplement is usually necessary to meet this goal.  When exposed to the sun, use a sunscreen that protects against both UVA and UVB radiation with an SPF of 30 or greater. Reapply every 2 to 3 hours or after sweating, drying off with a towel, or swimming. Always wear a seat belt when traveling in a car. Always wear a helmet when riding a bicycle or motorcycle.

## 2023-08-31 NOTE — PROGRESS NOTES
Medicare Annual Wellness Visit    Rudi Walker is here for Medicare AWV    Assessment & Plan   1. Encounter for annual wellness exam in Medicare patient  Doing well. Will get Flu and COVID shots in the next month. FU 4m as planned          Recommendations for Preventive Services Due: see orders and patient instructions/AVS.  Recommended screening schedule for the next 5-10 years is provided to the patient in written form: see Patient Instructions/AVS.     No follow-ups on file. Subjective       HM:  Flu/COVID: Will get Flu shot and will get COVID booster this year. Patient's complete Health Risk Assessment and screening values have been reviewed and are found in Flowsheets. The following problems were reviewed today and where indicated follow up appointments were made and/or referrals ordered. Positive Risk Factor Screenings with Interventions:                 Weight and Activity:  Physical Activity: Insufficiently Active    Days of Exercise per Week: 1 day    Minutes of Exercise per Session: 20 min     On average, how many days per week do you engage in moderate to strenuous exercise (like a brisk walk)?: 1 day  Have you lost any weight without trying in the past 3 months?: No  There is no height or weight on file to calculate BMI. (!) Abnormal  Obesity Interventions: Activity as tolerated. Wants to self refer to [place] for this. Hearing Screen:  Do you or your family notice any trouble with your hearing that hasn't been managed with hearing aids?: (!) Yes    Interventions:  He is thinking about getting hearing aids. Objective      Patient-Reported Vitals  No data recorded           Allergies   Allergen Reactions    Penicillins Other (See Comments)    Amoxicillin Rash     Prior to Visit Medications    Medication Sig Taking?  Authorizing Provider   rosuvastatin (CRESTOR) 10 MG tablet Take 1 tablet by mouth daily TAKE 1 Antonietta Loja Yes Jasmeet Haq MD

## 2023-09-14 ENCOUNTER — OFFICE VISIT (OUTPATIENT)
Age: 78
End: 2023-09-14

## 2023-09-14 VITALS
BODY MASS INDEX: 33.33 KG/M2 | DIASTOLIC BLOOD PRESSURE: 78 MMHG | HEIGHT: 69 IN | SYSTOLIC BLOOD PRESSURE: 128 MMHG | WEIGHT: 225 LBS | HEART RATE: 65 BPM

## 2023-09-14 DIAGNOSIS — R00.2 HEART PALPITATIONS: Primary | ICD-10-CM

## 2023-09-14 DIAGNOSIS — N40.1 BENIGN PROSTATIC HYPERPLASIA WITH URINARY HESITANCY: ICD-10-CM

## 2023-09-14 DIAGNOSIS — I48.0 PAF (PAROXYSMAL ATRIAL FIBRILLATION) (HCC): ICD-10-CM

## 2023-09-14 DIAGNOSIS — R39.11 BENIGN PROSTATIC HYPERPLASIA WITH URINARY HESITANCY: ICD-10-CM

## 2023-09-14 DIAGNOSIS — I10 ESSENTIAL (PRIMARY) HYPERTENSION: ICD-10-CM

## 2023-09-14 RX ORDER — FLECAINIDE ACETATE 100 MG/1
100 TABLET ORAL 2 TIMES DAILY
Qty: 180 TABLET | Refills: 3 | Status: SHIPPED | OUTPATIENT
Start: 2023-09-14

## 2023-11-27 ENCOUNTER — TELEPHONE (OUTPATIENT)
Age: 78
End: 2023-11-27

## 2023-11-27 ENCOUNTER — TELEMEDICINE (OUTPATIENT)
Dept: FAMILY MEDICINE CLINIC | Facility: CLINIC | Age: 78
End: 2023-11-27
Payer: MEDICARE

## 2023-11-27 DIAGNOSIS — J01.00 ACUTE NON-RECURRENT MAXILLARY SINUSITIS: Primary | ICD-10-CM

## 2023-11-27 PROCEDURE — G8427 DOCREV CUR MEDS BY ELIG CLIN: HCPCS | Performed by: FAMILY MEDICINE

## 2023-11-27 PROCEDURE — 1123F ACP DISCUSS/DSCN MKR DOCD: CPT | Performed by: FAMILY MEDICINE

## 2023-11-27 PROCEDURE — 99213 OFFICE O/P EST LOW 20 MIN: CPT | Performed by: FAMILY MEDICINE

## 2023-11-27 RX ORDER — CEFDINIR 300 MG/1
300 CAPSULE ORAL 2 TIMES DAILY
Qty: 14 CAPSULE | Refills: 0 | Status: SHIPPED | OUTPATIENT
Start: 2023-11-27 | End: 2023-12-04

## 2023-11-27 NOTE — TELEPHONE ENCOUNTER
Spoke with pharmacy who staed that they had received Rx, it has been filled & not time to be refilled again. Attempted to call both contact #s in pt chart, one VM was not set up, LVM on 2nd line notifying pt to call pharmacy or our office back if not resolved.

## 2023-11-27 NOTE — TELEPHONE ENCOUNTER
MEDICATION REFILL REQUEST      Name of Medication:  Flecainide  Dose:  100  Frequency:  BID  Quantity:  180  Days' supply:  80 with refills      Pharmacy Name/Location:  [id number]    MEDICATION REFILL REQUEST      Name of Medication:  Eliquis  Dose:  5 mg  Frequency:  BID  Quantity:  180  Days' supply:  90 with 3 refills      Pharmacy Name/Location:  [phone]

## 2023-11-27 NOTE — PROGRESS NOTES
Hector Campbell, was evaluated through a synchronous (real-time) audio-video encounter. The patient (or guardian if applicable) is aware that this is a billable service, which includes applicable co-pays. This Virtual Visit was conducted with patient's (and/or legal guardian's) consent. Patient identification was verified, and a caregiver was present when appropriate. The patient was located at Home: [address] [facility] [address]  Provider was located at [facility] (Appt Dept): [address] [facility] [address]      Effie Young (:  1945) is a Established patient, presenting virtually for evaluation of the following:    Assessment & Plan   Below is the assessment and plan developed based on review of pertinent history, physical exam, labs, studies, and medications. 1. Acute non-recurrent maxillary sinusitis  Will do cefdinir, but I encouraged him to wait another 24 hours to see how it goes, if he continues to improve on cold meds alone, this is likely all viral. Reviewed this with him a couple times. If starting abx, take probiotic as well. -     cefdinir (OMNICEF) 300 MG capsule; Take 1 capsule by mouth 2 times daily for 7 days, Disp-14 capsule, R-0Normal    Return if symptoms worsen or fail to improve. Subjective   On the line with cough as above, neg COVID test on day after symptoms started. He is prone to URIs at the change of seasons. Has allergy to PCN. Last time this happened about 6m ago we ended up putting him on Vantin after failing omnicef. Sx startred this time with a sore throat. Led to cough, runny nose. Symptoms today are slightly better than yesterday. Started APAP cold meds at that time. Having some pain radiating to the teeth/jaw. Denies F/C/N/V/D. Review of Systems   Constitutional:  Negative for chills and fever. HENT:  Positive for congestion, rhinorrhea, sinus pressure and sinus pain. Respiratory:  Positive for choking.  Negative for chest

## 2023-12-11 ENCOUNTER — TELEPHONE (OUTPATIENT)
Dept: FAMILY MEDICINE CLINIC | Facility: CLINIC | Age: 78
End: 2023-12-11

## 2023-12-11 RX ORDER — AZITHROMYCIN 250 MG/1
250 TABLET, FILM COATED ORAL SEE ADMIN INSTRUCTIONS
Qty: 6 TABLET | Refills: 0 | Status: SHIPPED | OUTPATIENT
Start: 2023-12-11 | End: 2023-12-16

## 2023-12-11 NOTE — TELEPHONE ENCOUNTER
Patient called stating he still has a cough and congestion. States he finished omnicef and got some better but never got completely better. Would like know if something else can be prescribed or if he needs another visit.

## 2024-01-02 ENCOUNTER — APPOINTMENT (RX ONLY)
Dept: URBAN - METROPOLITAN AREA CLINIC 25 | Facility: CLINIC | Age: 79
Setting detail: DERMATOLOGY
End: 2024-01-02

## 2024-01-02 DIAGNOSIS — L82.1 OTHER SEBORRHEIC KERATOSIS: ICD-10-CM

## 2024-01-02 DIAGNOSIS — L57.8 OTHER SKIN CHANGES DUE TO CHRONIC EXPOSURE TO NONIONIZING RADIATION: ICD-10-CM | Status: INADEQUATELY CONTROLLED

## 2024-01-02 DIAGNOSIS — Z85.828 PERSONAL HISTORY OF OTHER MALIGNANT NEOPLASM OF SKIN: ICD-10-CM

## 2024-01-02 DIAGNOSIS — L57.0 ACTINIC KERATOSIS: ICD-10-CM

## 2024-01-02 DIAGNOSIS — D22 MELANOCYTIC NEVI: ICD-10-CM

## 2024-01-02 DIAGNOSIS — D18.0 HEMANGIOMA: ICD-10-CM

## 2024-01-02 PROBLEM — D18.01 HEMANGIOMA OF SKIN AND SUBCUTANEOUS TISSUE: Status: ACTIVE | Noted: 2024-01-02

## 2024-01-02 PROBLEM — D22.0 MELANOCYTIC NEVI OF LIP: Status: ACTIVE | Noted: 2024-01-02

## 2024-01-02 PROCEDURE — ? PRESCRIPTION MEDICATION MANAGEMENT

## 2024-01-02 PROCEDURE — ? LIQUID NITROGEN

## 2024-01-02 PROCEDURE — ? COUNSELING

## 2024-01-02 PROCEDURE — 99214 OFFICE O/P EST MOD 30 MIN: CPT | Mod: 25

## 2024-01-02 PROCEDURE — 17004 DESTROY PREMAL LESIONS 15/>: CPT

## 2024-01-02 ASSESSMENT — LOCATION DETAILED DESCRIPTION DERM
LOCATION DETAILED: RIGHT MID-UPPER BACK
LOCATION DETAILED: LEFT SUPERIOR PREAURICULAR CHEEK
LOCATION DETAILED: LEFT LATERAL MALAR CHEEK
LOCATION DETAILED: RIGHT MEDIAL FOREHEAD
LOCATION DETAILED: EPIGASTRIC SKIN
LOCATION DETAILED: RIGHT LATERAL ZYGOMA
LOCATION DETAILED: RIGHT DISTAL RADIAL DORSAL FOREARM
LOCATION DETAILED: LEFT LATERAL INFERIOR CHEST
LOCATION DETAILED: LEFT SUPERIOR HELIX
LOCATION DETAILED: LEFT INFERIOR MEDIAL FOREHEAD
LOCATION DETAILED: RIGHT INFERIOR MEDIAL UPPER BACK
LOCATION DETAILED: RIGHT MEDIAL SUPERIOR CHEST
LOCATION DETAILED: LEFT DISTAL POSTERIOR UPPER ARM
LOCATION DETAILED: LEFT LATERAL MANDIBULAR CHEEK
LOCATION DETAILED: LEFT RADIAL DORSAL HAND
LOCATION DETAILED: LEFT INFERIOR POSTAURICULAR SKIN
LOCATION DETAILED: RIGHT MEDIAL TEMPLE
LOCATION DETAILED: LEFT SUPERIOR FOREHEAD
LOCATION DETAILED: RIGHT SUPERIOR HELIX
LOCATION DETAILED: LEFT MID PREAURICULAR CHEEK
LOCATION DETAILED: LEFT ULNAR DORSAL HAND
LOCATION DETAILED: RIGHT UPPER CUTANEOUS LIP
LOCATION DETAILED: RIGHT ELBOW
LOCATION DETAILED: RIGHT INFERIOR FOREHEAD
LOCATION DETAILED: LEFT MEDIAL FOREHEAD
LOCATION DETAILED: RIGHT FOREHEAD

## 2024-01-02 ASSESSMENT — LOCATION SIMPLE DESCRIPTION DERM
LOCATION SIMPLE: CHEST
LOCATION SIMPLE: LEFT HAND
LOCATION SIMPLE: RIGHT TEMPLE
LOCATION SIMPLE: RIGHT UPPER BACK
LOCATION SIMPLE: RIGHT FOREHEAD
LOCATION SIMPLE: SCALP
LOCATION SIMPLE: RIGHT ZYGOMA
LOCATION SIMPLE: ABDOMEN
LOCATION SIMPLE: RIGHT FOREARM
LOCATION SIMPLE: RIGHT EAR
LOCATION SIMPLE: LEFT EAR
LOCATION SIMPLE: LEFT FOREHEAD
LOCATION SIMPLE: LEFT CHEEK
LOCATION SIMPLE: LEFT UPPER ARM
LOCATION SIMPLE: RIGHT ELBOW
LOCATION SIMPLE: RIGHT LIP

## 2024-01-02 ASSESSMENT — LOCATION ZONE DERM
LOCATION ZONE: LIP
LOCATION ZONE: ARM
LOCATION ZONE: HAND
LOCATION ZONE: TRUNK
LOCATION ZONE: SCALP
LOCATION ZONE: FACE
LOCATION ZONE: EAR

## 2024-01-02 NOTE — PROCEDURE: PRESCRIPTION MEDICATION MANAGEMENT
Render In Strict Bullet Format?: No
Initiate Treatment: Efudex cream twice daily x 2 weeks - forehead (pt believes he has enough at home)
Detail Level: Zone

## 2024-01-10 DIAGNOSIS — E78.2 MIXED HYPERLIPIDEMIA: ICD-10-CM

## 2024-01-10 RX ORDER — ROSUVASTATIN CALCIUM 10 MG/1
10 TABLET, COATED ORAL DAILY
Qty: 90 TABLET | Refills: 1 | Status: SHIPPED | OUTPATIENT
Start: 2024-01-10

## 2024-01-30 ENCOUNTER — OFFICE VISIT (OUTPATIENT)
Dept: FAMILY MEDICINE CLINIC | Facility: CLINIC | Age: 79
End: 2024-01-30
Payer: MEDICARE

## 2024-01-30 VITALS
WEIGHT: 225 LBS | SYSTOLIC BLOOD PRESSURE: 156 MMHG | HEART RATE: 65 BPM | BODY MASS INDEX: 33.33 KG/M2 | DIASTOLIC BLOOD PRESSURE: 76 MMHG | HEIGHT: 69 IN

## 2024-01-30 DIAGNOSIS — H81.13 BENIGN PAROXYSMAL POSITIONAL VERTIGO DUE TO BILATERAL VESTIBULAR DISORDER: ICD-10-CM

## 2024-01-30 DIAGNOSIS — I48.0 PAF (PAROXYSMAL ATRIAL FIBRILLATION) (HCC): Primary | ICD-10-CM

## 2024-01-30 DIAGNOSIS — I10 ESSENTIAL (PRIMARY) HYPERTENSION: ICD-10-CM

## 2024-01-30 DIAGNOSIS — I48.0 PAF (PAROXYSMAL ATRIAL FIBRILLATION) (HCC): ICD-10-CM

## 2024-01-30 DIAGNOSIS — E78.2 MIXED HYPERLIPIDEMIA: ICD-10-CM

## 2024-01-30 DIAGNOSIS — E11.9 CONTROLLED TYPE 2 DIABETES MELLITUS WITHOUT COMPLICATION, WITHOUT LONG-TERM CURRENT USE OF INSULIN (HCC): ICD-10-CM

## 2024-01-30 LAB
ALBUMIN SERPL-MCNC: 4.1 G/DL (ref 3.2–4.6)
ALBUMIN/GLOB SERPL: 1.6 (ref 0.4–1.6)
ALP SERPL-CCNC: 85 U/L (ref 50–136)
ALT SERPL-CCNC: 29 U/L (ref 12–65)
ANION GAP SERPL CALC-SCNC: 2 MMOL/L (ref 2–11)
AST SERPL-CCNC: 17 U/L (ref 15–37)
BASOPHILS # BLD: 0 K/UL (ref 0–0.2)
BASOPHILS NFR BLD: 1 % (ref 0–2)
BILIRUB SERPL-MCNC: 0.8 MG/DL (ref 0.2–1.1)
BUN SERPL-MCNC: 11 MG/DL (ref 8–23)
CALCIUM SERPL-MCNC: 8.9 MG/DL (ref 8.3–10.4)
CHLORIDE SERPL-SCNC: 110 MMOL/L (ref 103–113)
CO2 SERPL-SCNC: 30 MMOL/L (ref 21–32)
CREAT SERPL-MCNC: 1.1 MG/DL (ref 0.8–1.5)
DIFFERENTIAL METHOD BLD: NORMAL
EOSINOPHIL # BLD: 0.2 K/UL (ref 0–0.8)
EOSINOPHIL NFR BLD: 4 % (ref 0.5–7.8)
ERYTHROCYTE [DISTWIDTH] IN BLOOD BY AUTOMATED COUNT: 13.7 % (ref 11.9–14.6)
GLOBULIN SER CALC-MCNC: 2.6 G/DL (ref 2.8–4.5)
GLUCOSE SERPL-MCNC: 130 MG/DL (ref 65–100)
HCT VFR BLD AUTO: 44.8 % (ref 41.1–50.3)
HGB BLD-MCNC: 14.5 G/DL (ref 13.6–17.2)
IMM GRANULOCYTES # BLD AUTO: 0 K/UL (ref 0–0.5)
IMM GRANULOCYTES NFR BLD AUTO: 0 % (ref 0–5)
LYMPHOCYTES # BLD: 1.1 K/UL (ref 0.5–4.6)
LYMPHOCYTES NFR BLD: 19 % (ref 13–44)
MCH RBC QN AUTO: 29.4 PG (ref 26.1–32.9)
MCHC RBC AUTO-ENTMCNC: 32.4 G/DL (ref 31.4–35)
MCV RBC AUTO: 90.9 FL (ref 82–102)
MONOCYTES # BLD: 0.5 K/UL (ref 0.1–1.3)
MONOCYTES NFR BLD: 9 % (ref 4–12)
NEUTS SEG # BLD: 3.9 K/UL (ref 1.7–8.2)
NEUTS SEG NFR BLD: 67 % (ref 43–78)
NRBC # BLD: 0 K/UL (ref 0–0.2)
PLATELET # BLD AUTO: 164 K/UL (ref 150–450)
PMV BLD AUTO: 11.3 FL (ref 9.4–12.3)
POTASSIUM SERPL-SCNC: 3.9 MMOL/L (ref 3.5–5.1)
PROT SERPL-MCNC: 6.7 G/DL (ref 6.3–8.2)
RBC # BLD AUTO: 4.93 M/UL (ref 4.23–5.6)
SODIUM SERPL-SCNC: 142 MMOL/L (ref 136–146)
TSH, 3RD GENERATION: 2.56 UIU/ML (ref 0.36–3.74)
WBC # BLD AUTO: 5.8 K/UL (ref 4.3–11.1)

## 2024-01-30 PROCEDURE — 1123F ACP DISCUSS/DSCN MKR DOCD: CPT | Performed by: FAMILY MEDICINE

## 2024-01-30 PROCEDURE — G8417 CALC BMI ABV UP PARAM F/U: HCPCS | Performed by: FAMILY MEDICINE

## 2024-01-30 PROCEDURE — 1036F TOBACCO NON-USER: CPT | Performed by: FAMILY MEDICINE

## 2024-01-30 PROCEDURE — 99214 OFFICE O/P EST MOD 30 MIN: CPT | Performed by: FAMILY MEDICINE

## 2024-01-30 PROCEDURE — 3078F DIAST BP <80 MM HG: CPT | Performed by: FAMILY MEDICINE

## 2024-01-30 PROCEDURE — G8427 DOCREV CUR MEDS BY ELIG CLIN: HCPCS | Performed by: FAMILY MEDICINE

## 2024-01-30 PROCEDURE — 3077F SYST BP >= 140 MM HG: CPT | Performed by: FAMILY MEDICINE

## 2024-01-30 PROCEDURE — G8484 FLU IMMUNIZE NO ADMIN: HCPCS | Performed by: FAMILY MEDICINE

## 2024-01-30 RX ORDER — METOPROLOL SUCCINATE 25 MG/1
12.5 TABLET, EXTENDED RELEASE ORAL AS NEEDED
Qty: 30 TABLET | Refills: 5 | Status: SHIPPED | OUTPATIENT
Start: 2024-01-30

## 2024-01-30 ASSESSMENT — PATIENT HEALTH QUESTIONNAIRE - PHQ9
SUM OF ALL RESPONSES TO PHQ QUESTIONS 1-9: 0
2. FEELING DOWN, DEPRESSED OR HOPELESS: 0
1. LITTLE INTEREST OR PLEASURE IN DOING THINGS: 0
SUM OF ALL RESPONSES TO PHQ QUESTIONS 1-9: 0
SUM OF ALL RESPONSES TO PHQ QUESTIONS 1-9: 0
SUM OF ALL RESPONSES TO PHQ9 QUESTIONS 1 & 2: 0
SUM OF ALL RESPONSES TO PHQ QUESTIONS 1-9: 0

## 2024-01-30 ASSESSMENT — ENCOUNTER SYMPTOMS
BLOOD IN STOOL: 0
CHEST TIGHTNESS: 0
SHORTNESS OF BREATH: 0
ABDOMINAL PAIN: 0

## 2024-01-30 NOTE — PROGRESS NOTES
[facility]  _______________________________________  MD Paola Asif, DO Evie Aguilar, MD Nicole Britton MD    [address] [place] [address]  Phone: [phone]  Fax: [phone]    Hector Campbell (:  1945) is a 79 y.o. male,Established patient, here for evaluation of the following chief complaint(s):  Cholesterol Problem, Hypertension, and Diabetes         ASSESSMENT/PLAN:      1. Mixed hyperlipidemia  Stable on current therapy, will check LFTs.   - Comprehensive Metabolic Panel; Future    2. PAF (paroxysmal atrial fibrillation) (HCC)  Stable, continue current regimen.   Fu with cardiology as planned.  - metoprolol succinate (TOPROL XL) 25 MG extended release tablet; Take 0.5 tablets by mouth as needed (Tachycardia) Indications: ONLY TAKING PRN  Dispense: 30 tablet; Refill: 5  - CBC with Auto Differential; Future  - TSH; Future    3. Essential (primary) hypertension  Has not taken meds yet.   Stable. Continue current regimen, check renal function.    - metoprolol succinate (TOPROL XL) 25 MG extended release tablet; Take 0.5 tablets by mouth as needed (Tachycardia) Indications: ONLY TAKING PRN  Dispense: 30 tablet; Refill: 5  - Comprehensive Metabolic Panel; Future  - TSH; Future    4. Controlled type 2 diabetes mellitus without complication, without long-term current use of insulin (HCC)  Stable, check A1C and renal function.     - Comprehensive Metabolic Panel; Future  - Hemoglobin A1C; Future    5. Benign paroxysmal positional vertigo due to bilateral vestibular disorder  RRR today.   Stable, continue current regimen.           FU 6m    Subjective   SUBJECTIVE/OBJECTIVE:  DM: He has been diet controlled for a while now. Sugars have been well controlled.   Hemoglobin A1C   Date Value Ref Range Status   2023 6.1 (H) 4.8 - 5.6 % Final     Lab Results   Component Value Date/Time     2023 09:11 AM    K 4.0

## 2024-01-31 LAB
EST. AVERAGE GLUCOSE BLD GHB EST-MCNC: 123 MG/DL
HBA1C MFR BLD: 5.9 % (ref 4.8–5.6)

## 2024-02-27 ENCOUNTER — TELEPHONE (OUTPATIENT)
Dept: FAMILY MEDICINE CLINIC | Facility: CLINIC | Age: 79
End: 2024-02-27

## 2024-02-27 NOTE — TELEPHONE ENCOUNTER
Patient called into the office regarding some ear pain with dizzy and nausea yesterday morning. He doesn't really fell nausea today but is still experiencing some dizziness. Has a hx of ear infections. Patient stated that he usually gets them a couple times a year.

## 2024-02-28 ENCOUNTER — OFFICE VISIT (OUTPATIENT)
Dept: FAMILY MEDICINE CLINIC | Facility: CLINIC | Age: 79
End: 2024-02-28
Payer: MEDICARE

## 2024-02-28 VITALS
BODY MASS INDEX: 33.92 KG/M2 | DIASTOLIC BLOOD PRESSURE: 81 MMHG | TEMPERATURE: 98 F | SYSTOLIC BLOOD PRESSURE: 125 MMHG | WEIGHT: 229 LBS | HEIGHT: 69 IN | HEART RATE: 71 BPM

## 2024-02-28 DIAGNOSIS — H81.13 BENIGN PAROXYSMAL POSITIONAL VERTIGO DUE TO BILATERAL VESTIBULAR DISORDER: Primary | ICD-10-CM

## 2024-02-28 PROCEDURE — G8484 FLU IMMUNIZE NO ADMIN: HCPCS | Performed by: NURSE PRACTITIONER

## 2024-02-28 PROCEDURE — G8427 DOCREV CUR MEDS BY ELIG CLIN: HCPCS | Performed by: NURSE PRACTITIONER

## 2024-02-28 PROCEDURE — G8417 CALC BMI ABV UP PARAM F/U: HCPCS | Performed by: NURSE PRACTITIONER

## 2024-02-28 PROCEDURE — 1123F ACP DISCUSS/DSCN MKR DOCD: CPT | Performed by: NURSE PRACTITIONER

## 2024-02-28 PROCEDURE — 3074F SYST BP LT 130 MM HG: CPT | Performed by: NURSE PRACTITIONER

## 2024-02-28 PROCEDURE — 3079F DIAST BP 80-89 MM HG: CPT | Performed by: NURSE PRACTITIONER

## 2024-02-28 PROCEDURE — 1036F TOBACCO NON-USER: CPT | Performed by: NURSE PRACTITIONER

## 2024-02-28 PROCEDURE — 99213 OFFICE O/P EST LOW 20 MIN: CPT | Performed by: NURSE PRACTITIONER

## 2024-02-28 RX ORDER — MECLIZINE HYDROCHLORIDE 25 MG/1
25 TABLET ORAL 3 TIMES DAILY PRN
Qty: 30 TABLET | Refills: 0 | Status: SHIPPED | OUTPATIENT
Start: 2024-02-28 | End: 2024-03-09

## 2024-02-28 ASSESSMENT — ENCOUNTER SYMPTOMS
NAUSEA: 1
CHEST TIGHTNESS: 0
SHORTNESS OF BREATH: 0

## 2024-02-28 NOTE — PROGRESS NOTES
tablet; Take 1 tablet by mouth 3 times daily as needed for Dizziness, Disp-30 tablet, R-0Normal       Continue meclizine prn get new Rx filled if old bottle over 5 years old. Arise slowly and hold on to things to prevent falls. New or worsening S.S such as weakness numbness return for further evaluation or go to er    ELIZABETH Perkins - NP

## 2024-03-14 ENCOUNTER — OFFICE VISIT (OUTPATIENT)
Age: 79
End: 2024-03-14
Payer: MEDICARE

## 2024-03-14 VITALS
SYSTOLIC BLOOD PRESSURE: 140 MMHG | DIASTOLIC BLOOD PRESSURE: 82 MMHG | WEIGHT: 230 LBS | HEART RATE: 70 BPM | HEIGHT: 69 IN | BODY MASS INDEX: 34.07 KG/M2

## 2024-03-14 DIAGNOSIS — I48.0 PAF (PAROXYSMAL ATRIAL FIBRILLATION) (HCC): Primary | ICD-10-CM

## 2024-03-14 PROCEDURE — G8484 FLU IMMUNIZE NO ADMIN: HCPCS | Performed by: INTERNAL MEDICINE

## 2024-03-14 PROCEDURE — 93000 ELECTROCARDIOGRAM COMPLETE: CPT | Performed by: INTERNAL MEDICINE

## 2024-03-14 PROCEDURE — 3077F SYST BP >= 140 MM HG: CPT | Performed by: INTERNAL MEDICINE

## 2024-03-14 PROCEDURE — 1036F TOBACCO NON-USER: CPT | Performed by: INTERNAL MEDICINE

## 2024-03-14 PROCEDURE — 3079F DIAST BP 80-89 MM HG: CPT | Performed by: INTERNAL MEDICINE

## 2024-03-14 PROCEDURE — G8428 CUR MEDS NOT DOCUMENT: HCPCS | Performed by: INTERNAL MEDICINE

## 2024-03-14 PROCEDURE — 1123F ACP DISCUSS/DSCN MKR DOCD: CPT | Performed by: INTERNAL MEDICINE

## 2024-03-14 PROCEDURE — G8417 CALC BMI ABV UP PARAM F/U: HCPCS | Performed by: INTERNAL MEDICINE

## 2024-03-14 PROCEDURE — 99214 OFFICE O/P EST MOD 30 MIN: CPT | Performed by: INTERNAL MEDICINE

## 2024-03-14 NOTE — PROGRESS NOTES
[facility] CARDIOLOGY, [address], [address]  [place] [address]  PHONE: [phone]  1945    SUBJECTIVE:   Hector Campbell is a 79 y.o. male seen for a follow up visit regarding the following:     Chief Complaint   Patient presents with    Atrial Fibrillation       HPI:    Hector Campbell is a very pleasant 79 y.o. male with a past medical and cardiac history significant for HLP, no prior cardiac problems, DM type 2 diet controlled, no prior CVA, and pAF on flecainide and presents for follow up of afib. He has been doing overall well, but has had several episodes of self limiting AF.    Cardiac PMH: (Old records have been reviewed and summarized below)   TTE (12/18): EF 50-55%, mild LAE      Reviewed office note King BASILIA 2/28/24    Past Medical History, Past Surgical History, Family history, Social History, and Medications were all reviewed with the patient today and updated as necessary.     Current Outpatient Medications   Medication Sig Dispense Refill    metoprolol succinate (TOPROL XL) 25 MG extended release tablet Take 0.5 tablets by mouth as needed (Tachycardia) Indications: ONLY TAKING PRN 30 tablet 5    rosuvastatin (CRESTOR) 10 MG tablet Take 1 tablet by mouth daily TAKE 1 TABLET BY MOUTH EVERY DAY 90 tablet 1    apixaban (ELIQUIS) 5 MG TABS tablet Take 1 tablet by mouth in the morning and 1 tablet in the evening. 180 tablet 3    flecainide (TAMBOCOR) 100 MG tablet Take 1 tablet by mouth 2 times daily TAKE 1 TABLET BY MOUTH TWICE A  tablet 3    aspirin 81 MG EC tablet Take 1 tablet by mouth daily      Flaxseed, Linseed, (FLAX SEED OIL) 1000 MG CAPS Take 1 capsule by mouth daily       No current facility-administered medications for this visit.     Allergies   Allergen Reactions    Penicillins Other (See Comments)    Amoxicillin Rash     [unfilled]  Past Medical History:   Diagnosis Date    Allergic rhinitis     takes allegra    Arrhythmia     pt states \"heart will skip a beat\"

## 2024-05-14 NOTE — PROCEDURE: TREATMENT REGIMEN
Gave the patient a call to attempt to schedule the patient to see Dr. Mark. Dr. Mark did not have any available appointments until July and I currently do not have the access to schedule in July. Will reach back out if something becomes available before July or will reach back out when I can schedule in July.  
Plan: Patient is instructed to use on forehead and temples.
Continue Regimen: Fluorouracil
Detail Level: Zone

## 2024-06-24 DIAGNOSIS — E78.2 MIXED HYPERLIPIDEMIA: ICD-10-CM

## 2024-06-24 RX ORDER — ROSUVASTATIN CALCIUM 10 MG/1
10 TABLET, COATED ORAL DAILY
Qty: 90 TABLET | Refills: 1 | Status: SHIPPED | OUTPATIENT
Start: 2024-06-24

## 2024-07-02 ENCOUNTER — APPOINTMENT (RX ONLY)
Dept: URBAN - METROPOLITAN AREA CLINIC 25 | Facility: CLINIC | Age: 79
Setting detail: DERMATOLOGY
End: 2024-07-02

## 2024-07-02 DIAGNOSIS — D22 MELANOCYTIC NEVI: ICD-10-CM

## 2024-07-02 DIAGNOSIS — L57.8 OTHER SKIN CHANGES DUE TO CHRONIC EXPOSURE TO NONIONIZING RADIATION: ICD-10-CM

## 2024-07-02 DIAGNOSIS — Z85.828 PERSONAL HISTORY OF OTHER MALIGNANT NEOPLASM OF SKIN: ICD-10-CM

## 2024-07-02 DIAGNOSIS — L82.1 OTHER SEBORRHEIC KERATOSIS: ICD-10-CM

## 2024-07-02 DIAGNOSIS — L57.0 ACTINIC KERATOSIS: ICD-10-CM

## 2024-07-02 DIAGNOSIS — D18.0 HEMANGIOMA: ICD-10-CM

## 2024-07-02 PROBLEM — D18.01 HEMANGIOMA OF SKIN AND SUBCUTANEOUS TISSUE: Status: ACTIVE | Noted: 2024-07-02

## 2024-07-02 PROBLEM — D22.0 MELANOCYTIC NEVI OF LIP: Status: ACTIVE | Noted: 2024-07-02

## 2024-07-02 PROCEDURE — ? LIQUID NITROGEN

## 2024-07-02 PROCEDURE — ? COUNSELING

## 2024-07-02 PROCEDURE — 17004 DESTROY PREMAL LESIONS 15/>: CPT

## 2024-07-02 PROCEDURE — 99213 OFFICE O/P EST LOW 20 MIN: CPT | Mod: 25

## 2024-07-02 ASSESSMENT — LOCATION SIMPLE DESCRIPTION DERM
LOCATION SIMPLE: RIGHT FOREARM
LOCATION SIMPLE: RIGHT CHEEK
LOCATION SIMPLE: NOSE
LOCATION SIMPLE: LEFT HAND
LOCATION SIMPLE: ABDOMEN
LOCATION SIMPLE: CHEST
LOCATION SIMPLE: RIGHT UPPER BACK
LOCATION SIMPLE: RIGHT ZYGOMA
LOCATION SIMPLE: RIGHT FOREHEAD
LOCATION SIMPLE: LEFT CHEEK
LOCATION SIMPLE: RIGHT LIP
LOCATION SIMPLE: LEFT FOREHEAD
LOCATION SIMPLE: LEFT FOREARM

## 2024-07-02 ASSESSMENT — LOCATION DETAILED DESCRIPTION DERM
LOCATION DETAILED: LEFT PROXIMAL DORSAL FOREARM
LOCATION DETAILED: RIGHT SUPERIOR LATERAL MALAR CHEEK
LOCATION DETAILED: RIGHT DISTAL DORSAL FOREARM
LOCATION DETAILED: RIGHT MEDIAL SUPERIOR CHEST
LOCATION DETAILED: LEFT CENTRAL MALAR CHEEK
LOCATION DETAILED: RIGHT PROXIMAL DORSAL FOREARM
LOCATION DETAILED: RIGHT INFERIOR MEDIAL UPPER BACK
LOCATION DETAILED: RIGHT SUPERIOR CENTRAL MALAR CHEEK
LOCATION DETAILED: LEFT LATERAL INFERIOR CHEST
LOCATION DETAILED: RIGHT UPPER CUTANEOUS LIP
LOCATION DETAILED: RIGHT DISTAL RADIAL DORSAL FOREARM
LOCATION DETAILED: LEFT DISTAL DORSAL FOREARM
LOCATION DETAILED: RIGHT INFERIOR CENTRAL MALAR CHEEK
LOCATION DETAILED: RIGHT LATERAL ZYGOMA
LOCATION DETAILED: RIGHT CENTRAL MALAR CHEEK
LOCATION DETAILED: RIGHT SUPERIOR MEDIAL FOREHEAD
LOCATION DETAILED: RIGHT MID-UPPER BACK
LOCATION DETAILED: LEFT LATERAL BUCCAL CHEEK
LOCATION DETAILED: LEFT ULNAR DORSAL HAND
LOCATION DETAILED: LEFT NASAL DORSUM
LOCATION DETAILED: EPIGASTRIC SKIN
LOCATION DETAILED: LEFT INFERIOR CENTRAL MALAR CHEEK
LOCATION DETAILED: RIGHT FOREHEAD
LOCATION DETAILED: LEFT SUPERIOR FOREHEAD

## 2024-07-02 ASSESSMENT — LOCATION ZONE DERM
LOCATION ZONE: TRUNK
LOCATION ZONE: ARM
LOCATION ZONE: HAND
LOCATION ZONE: FACE
LOCATION ZONE: LIP
LOCATION ZONE: NOSE

## 2024-07-29 SDOH — ECONOMIC STABILITY: FOOD INSECURITY: WITHIN THE PAST 12 MONTHS, THE FOOD YOU BOUGHT JUST DIDN'T LAST AND YOU DIDN'T HAVE MONEY TO GET MORE.: NEVER TRUE

## 2024-07-29 SDOH — ECONOMIC STABILITY: FOOD INSECURITY: WITHIN THE PAST 12 MONTHS, YOU WORRIED THAT YOUR FOOD WOULD RUN OUT BEFORE YOU GOT MONEY TO BUY MORE.: NEVER TRUE

## 2024-07-29 SDOH — ECONOMIC STABILITY: INCOME INSECURITY: HOW HARD IS IT FOR YOU TO PAY FOR THE VERY BASICS LIKE FOOD, HOUSING, MEDICAL CARE, AND HEATING?: NOT HARD AT ALL

## 2024-07-29 SDOH — ECONOMIC STABILITY: TRANSPORTATION INSECURITY
IN THE PAST 12 MONTHS, HAS LACK OF TRANSPORTATION KEPT YOU FROM MEETINGS, WORK, OR FROM GETTING THINGS NEEDED FOR DAILY LIVING?: NO

## 2024-07-30 ENCOUNTER — OFFICE VISIT (OUTPATIENT)
Dept: FAMILY MEDICINE CLINIC | Facility: CLINIC | Age: 79
End: 2024-07-30
Payer: MEDICARE

## 2024-07-30 VITALS
SYSTOLIC BLOOD PRESSURE: 118 MMHG | DIASTOLIC BLOOD PRESSURE: 80 MMHG | BODY MASS INDEX: 33.33 KG/M2 | HEIGHT: 69 IN | WEIGHT: 225 LBS

## 2024-07-30 DIAGNOSIS — R35.0 BENIGN PROSTATIC HYPERPLASIA WITH URINARY FREQUENCY: ICD-10-CM

## 2024-07-30 DIAGNOSIS — E78.2 MIXED HYPERLIPIDEMIA: ICD-10-CM

## 2024-07-30 DIAGNOSIS — I48.0 PAF (PAROXYSMAL ATRIAL FIBRILLATION) (HCC): ICD-10-CM

## 2024-07-30 DIAGNOSIS — H81.13 BENIGN PAROXYSMAL POSITIONAL VERTIGO DUE TO BILATERAL VESTIBULAR DISORDER: ICD-10-CM

## 2024-07-30 DIAGNOSIS — I10 ESSENTIAL (PRIMARY) HYPERTENSION: ICD-10-CM

## 2024-07-30 DIAGNOSIS — E11.9 CONTROLLED TYPE 2 DIABETES MELLITUS WITHOUT COMPLICATION, WITHOUT LONG-TERM CURRENT USE OF INSULIN (HCC): Primary | ICD-10-CM

## 2024-07-30 DIAGNOSIS — N40.1 BENIGN PROSTATIC HYPERPLASIA WITH URINARY FREQUENCY: ICD-10-CM

## 2024-07-30 DIAGNOSIS — E11.9 CONTROLLED TYPE 2 DIABETES MELLITUS WITHOUT COMPLICATION, WITHOUT LONG-TERM CURRENT USE OF INSULIN (HCC): ICD-10-CM

## 2024-07-30 LAB
ALBUMIN SERPL-MCNC: 4 G/DL (ref 3.2–4.6)
ALBUMIN/GLOB SERPL: 1.8 (ref 1–1.9)
ALP SERPL-CCNC: 90 U/L (ref 40–129)
ALT SERPL-CCNC: 23 U/L (ref 12–65)
ANION GAP SERPL CALC-SCNC: 10 MMOL/L (ref 9–18)
AST SERPL-CCNC: 21 U/L (ref 15–37)
BASOPHILS # BLD: 0.1 K/UL (ref 0–0.2)
BASOPHILS NFR BLD: 1 % (ref 0–2)
BILIRUB SERPL-MCNC: 0.6 MG/DL (ref 0–1.2)
BUN SERPL-MCNC: 11 MG/DL (ref 8–23)
CALCIUM SERPL-MCNC: 9.1 MG/DL (ref 8.8–10.2)
CHLORIDE SERPL-SCNC: 104 MMOL/L (ref 98–107)
CHOLEST SERPL-MCNC: 109 MG/DL (ref 0–200)
CO2 SERPL-SCNC: 25 MMOL/L (ref 20–28)
DIFFERENTIAL METHOD BLD: NORMAL
EOSINOPHIL # BLD: 0.2 K/UL (ref 0–0.8)
EOSINOPHIL NFR BLD: 3 % (ref 0.5–7.8)
ERYTHROCYTE [DISTWIDTH] IN BLOOD BY AUTOMATED COUNT: 13.6 % (ref 11.9–14.6)
EST. AVERAGE GLUCOSE BLD GHB EST-MCNC: 132 MG/DL
GLOBULIN SER CALC-MCNC: 2.3 G/DL (ref 2.3–3.5)
GLUCOSE SERPL-MCNC: 126 MG/DL (ref 70–99)
HBA1C MFR BLD: 6.2 % (ref 0–5.6)
HCT VFR BLD AUTO: 46.1 % (ref 41.1–50.3)
HDLC SERPL-MCNC: 35 MG/DL (ref 40–60)
HDLC SERPL: 3.1 (ref 0–5)
HGB BLD-MCNC: 15.2 G/DL (ref 13.6–17.2)
IMM GRANULOCYTES # BLD AUTO: 0 K/UL (ref 0–0.5)
IMM GRANULOCYTES NFR BLD AUTO: 0 % (ref 0–5)
LDLC SERPL CALC-MCNC: 51 MG/DL (ref 0–100)
LYMPHOCYTES # BLD: 1.2 K/UL (ref 0.5–4.6)
LYMPHOCYTES NFR BLD: 20 % (ref 13–44)
MCH RBC QN AUTO: 30 PG (ref 26.1–32.9)
MCHC RBC AUTO-ENTMCNC: 33 G/DL (ref 31.4–35)
MCV RBC AUTO: 90.9 FL (ref 82–102)
MONOCYTES # BLD: 0.5 K/UL (ref 0.1–1.3)
MONOCYTES NFR BLD: 8 % (ref 4–12)
NEUTS SEG # BLD: 4.2 K/UL (ref 1.7–8.2)
NEUTS SEG NFR BLD: 68 % (ref 43–78)
NRBC # BLD: 0 K/UL (ref 0–0.2)
PLATELET # BLD AUTO: 159 K/UL (ref 150–450)
PMV BLD AUTO: 11.4 FL (ref 9.4–12.3)
POTASSIUM SERPL-SCNC: 4.2 MMOL/L (ref 3.5–5.1)
PROT SERPL-MCNC: 6.3 G/DL (ref 6.3–8.2)
PSA SERPL-MCNC: 0.4 NG/ML (ref 0–4)
RBC # BLD AUTO: 5.07 M/UL (ref 4.23–5.6)
SODIUM SERPL-SCNC: 139 MMOL/L (ref 136–145)
TRIGL SERPL-MCNC: 118 MG/DL (ref 0–150)
VLDLC SERPL CALC-MCNC: 24 MG/DL (ref 6–23)
WBC # BLD AUTO: 6.1 K/UL (ref 4.3–11.1)

## 2024-07-30 PROCEDURE — 1123F ACP DISCUSS/DSCN MKR DOCD: CPT | Performed by: FAMILY MEDICINE

## 2024-07-30 PROCEDURE — G2211 COMPLEX E/M VISIT ADD ON: HCPCS | Performed by: FAMILY MEDICINE

## 2024-07-30 PROCEDURE — 99214 OFFICE O/P EST MOD 30 MIN: CPT | Performed by: FAMILY MEDICINE

## 2024-07-30 PROCEDURE — 3074F SYST BP LT 130 MM HG: CPT | Performed by: FAMILY MEDICINE

## 2024-07-30 PROCEDURE — 3079F DIAST BP 80-89 MM HG: CPT | Performed by: FAMILY MEDICINE

## 2024-07-30 PROCEDURE — 3044F HG A1C LEVEL LT 7.0%: CPT | Performed by: FAMILY MEDICINE

## 2024-07-30 PROCEDURE — G8417 CALC BMI ABV UP PARAM F/U: HCPCS | Performed by: FAMILY MEDICINE

## 2024-07-30 PROCEDURE — 1036F TOBACCO NON-USER: CPT | Performed by: FAMILY MEDICINE

## 2024-07-30 PROCEDURE — G8427 DOCREV CUR MEDS BY ELIG CLIN: HCPCS | Performed by: FAMILY MEDICINE

## 2024-07-30 RX ORDER — TAMSULOSIN HYDROCHLORIDE 0.4 MG/1
0.4 CAPSULE ORAL DAILY
Qty: 90 CAPSULE | Refills: 1 | Status: SHIPPED | OUTPATIENT
Start: 2024-07-30

## 2024-07-30 RX ORDER — ROSUVASTATIN CALCIUM 10 MG/1
10 TABLET, COATED ORAL DAILY
Qty: 90 TABLET | Refills: 1 | Status: SHIPPED | OUTPATIENT
Start: 2024-07-30

## 2024-07-30 ASSESSMENT — ENCOUNTER SYMPTOMS
SHORTNESS OF BREATH: 0
BLOOD IN STOOL: 0
CHEST TIGHTNESS: 0
ABDOMINAL PAIN: 0

## 2024-07-30 NOTE — PROGRESS NOTES
[facility]  _______________________________________  MD Paola Asif DO Elizabeth King, MD Nicole Britton MD    [address] [place] [address]  Phone: [phone]  Fax: [phone]    Hector Campbell (:  1945) is a 79 y.o. male,Established patient, here for evaluation of the following chief complaint(s):  Cholesterol Problem, Hypertension, and Diabetes      Assessment & Plan   ASSESSMENT/PLAN:        1. PAF (paroxysmal atrial fibrillation) (HCC)  RRR today.   Stable, continue current regimen.     - CBC with Auto Differential; Future    2. Essential (primary) hypertension  Stable. Continue current regimen, check renal function.    - Comprehensive Metabolic Panel; Future    3. Mixed hyperlipidemia  Stable on current therapy, will check LFTs.     - rosuvastatin (CRESTOR) 10 MG tablet; Take 1 tablet by mouth daily TAKE 1 TABLET BY MOUTH EVERY DAY  Dispense: 90 tablet; Refill: 1  - Comprehensive Metabolic Panel; Future  - Lipid Panel; Future    4. Controlled type 2 diabetes mellitus without complication, without long-term current use of insulin (HCC)  Stable, check A1C and renal function.     - Comprehensive Metabolic Panel; Future  - Hemoglobin A1C; Future    5. Benign paroxysmal positional vertigo due to bilateral vestibular disorder  Stable, continue current regimen.       6. Benign prostatic hyperplasia with urinary frequency  Check PSA, sending to urology because he may have a lateral right nodule on the prostate. Try flomax now, warned on first dose ortho hypotension.   - PSA, Diagnostic; Future  - [facility] - Meño Crabtree MD, Urology, [place]  - tamsulosin (FLOMAX) 0.4 MG capsule; Take 1 capsule by mouth daily  Dispense: 90 capsule; Refill: 1            Return in about 3 months (around 10/30/2024).      Subjective   SUBJECTIVE/OBJECTIVE:  DM: He has been diet controlled for a while now. Sugars have been well controlled.

## 2024-09-10 ENCOUNTER — OFFICE VISIT (OUTPATIENT)
Dept: UROLOGY | Age: 79
End: 2024-09-10
Payer: MEDICARE

## 2024-09-10 DIAGNOSIS — R35.0 BENIGN PROSTATIC HYPERPLASIA WITH URINARY FREQUENCY: Primary | ICD-10-CM

## 2024-09-10 DIAGNOSIS — N40.1 BENIGN PROSTATIC HYPERPLASIA WITH URINARY FREQUENCY: Primary | ICD-10-CM

## 2024-09-10 LAB
BILIRUBIN, URINE, POC: NEGATIVE
BLOOD URINE, POC: NORMAL
GLUCOSE URINE, POC: NEGATIVE
KETONES, URINE, POC: NEGATIVE
LEUKOCYTE ESTERASE, URINE, POC: NORMAL
NITRITE, URINE, POC: NEGATIVE
PH, URINE, POC: 7 (ref 4.6–8)
PROTEIN,URINE, POC: NEGATIVE
SPECIFIC GRAVITY, URINE, POC: 1.02 (ref 1–1.03)
URINALYSIS CLARITY, POC: NORMAL
URINALYSIS COLOR, POC: NORMAL
UROBILINOGEN, POC: NORMAL

## 2024-09-10 PROCEDURE — 1036F TOBACCO NON-USER: CPT | Performed by: UROLOGY

## 2024-09-10 PROCEDURE — G8417 CALC BMI ABV UP PARAM F/U: HCPCS | Performed by: UROLOGY

## 2024-09-10 PROCEDURE — 81003 URINALYSIS AUTO W/O SCOPE: CPT | Performed by: UROLOGY

## 2024-09-10 PROCEDURE — G8427 DOCREV CUR MEDS BY ELIG CLIN: HCPCS | Performed by: UROLOGY

## 2024-09-10 PROCEDURE — 99204 OFFICE O/P NEW MOD 45 MIN: CPT | Performed by: UROLOGY

## 2024-09-10 PROCEDURE — 1123F ACP DISCUSS/DSCN MKR DOCD: CPT | Performed by: UROLOGY

## 2024-09-10 RX ORDER — FINASTERIDE 5 MG/1
5 TABLET, FILM COATED ORAL DAILY
Qty: 90 TABLET | Refills: 3 | Status: SHIPPED | OUTPATIENT
Start: 2024-09-10

## 2024-09-10 RX ORDER — MECLIZINE HYDROCHLORIDE 25 MG/1
25 TABLET ORAL 3 TIMES DAILY PRN
COMMUNITY

## 2024-09-10 ASSESSMENT — ENCOUNTER SYMPTOMS
SKIN LESIONS: 0
ABDOMINAL PAIN: 0
WHEEZING: 0
EYE DISCHARGE: 0
BACK PAIN: 0
COUGH: 0
VOMITING: 0
SHORTNESS OF BREATH: 0
HEARTBURN: 0
BLOOD IN STOOL: 0
NAUSEA: 0
CONSTIPATION: 0
EYE PAIN: 0
DIARRHEA: 0
INDIGESTION: 0

## 2024-09-18 ENCOUNTER — OFFICE VISIT (OUTPATIENT)
Age: 79
End: 2024-09-18
Payer: MEDICARE

## 2024-09-18 VITALS
HEART RATE: 65 BPM | BODY MASS INDEX: 34.07 KG/M2 | DIASTOLIC BLOOD PRESSURE: 70 MMHG | WEIGHT: 230 LBS | SYSTOLIC BLOOD PRESSURE: 138 MMHG | HEIGHT: 69 IN

## 2024-09-18 DIAGNOSIS — I48.0 PAF (PAROXYSMAL ATRIAL FIBRILLATION) (HCC): Primary | ICD-10-CM

## 2024-09-18 DIAGNOSIS — R00.2 HEART PALPITATIONS: ICD-10-CM

## 2024-09-18 PROCEDURE — 3075F SYST BP GE 130 - 139MM HG: CPT | Performed by: INTERNAL MEDICINE

## 2024-09-18 PROCEDURE — 99214 OFFICE O/P EST MOD 30 MIN: CPT | Performed by: INTERNAL MEDICINE

## 2024-09-18 PROCEDURE — G8417 CALC BMI ABV UP PARAM F/U: HCPCS | Performed by: INTERNAL MEDICINE

## 2024-09-18 PROCEDURE — G8427 DOCREV CUR MEDS BY ELIG CLIN: HCPCS | Performed by: INTERNAL MEDICINE

## 2024-09-18 PROCEDURE — 3078F DIAST BP <80 MM HG: CPT | Performed by: INTERNAL MEDICINE

## 2024-09-18 PROCEDURE — 1036F TOBACCO NON-USER: CPT | Performed by: INTERNAL MEDICINE

## 2024-09-18 PROCEDURE — 1123F ACP DISCUSS/DSCN MKR DOCD: CPT | Performed by: INTERNAL MEDICINE

## 2024-09-18 PROCEDURE — 93000 ELECTROCARDIOGRAM COMPLETE: CPT | Performed by: INTERNAL MEDICINE

## 2024-10-30 ENCOUNTER — OFFICE VISIT (OUTPATIENT)
Dept: FAMILY MEDICINE CLINIC | Facility: CLINIC | Age: 79
End: 2024-10-30

## 2024-10-30 VITALS
HEIGHT: 69 IN | HEART RATE: 128 BPM | DIASTOLIC BLOOD PRESSURE: 60 MMHG | BODY MASS INDEX: 33.47 KG/M2 | WEIGHT: 226 LBS | SYSTOLIC BLOOD PRESSURE: 111 MMHG

## 2024-10-30 DIAGNOSIS — E78.2 MIXED HYPERLIPIDEMIA: ICD-10-CM

## 2024-10-30 DIAGNOSIS — R35.0 BENIGN PROSTATIC HYPERPLASIA WITH URINARY FREQUENCY: ICD-10-CM

## 2024-10-30 DIAGNOSIS — E11.9 CONTROLLED TYPE 2 DIABETES MELLITUS WITHOUT COMPLICATION, WITHOUT LONG-TERM CURRENT USE OF INSULIN (HCC): Primary | ICD-10-CM

## 2024-10-30 DIAGNOSIS — N40.1 BENIGN PROSTATIC HYPERPLASIA WITH URINARY FREQUENCY: ICD-10-CM

## 2024-10-30 DIAGNOSIS — K59.03 DRUG-INDUCED CONSTIPATION: ICD-10-CM

## 2024-10-30 DIAGNOSIS — H81.13 BENIGN PAROXYSMAL POSITIONAL VERTIGO DUE TO BILATERAL VESTIBULAR DISORDER: ICD-10-CM

## 2024-10-30 DIAGNOSIS — I48.0 PAF (PAROXYSMAL ATRIAL FIBRILLATION) (HCC): ICD-10-CM

## 2024-10-30 DIAGNOSIS — E11.9 CONTROLLED TYPE 2 DIABETES MELLITUS WITHOUT COMPLICATION, WITHOUT LONG-TERM CURRENT USE OF INSULIN (HCC): ICD-10-CM

## 2024-10-30 DIAGNOSIS — I10 ESSENTIAL (PRIMARY) HYPERTENSION: ICD-10-CM

## 2024-10-30 LAB
ALBUMIN SERPL-MCNC: 4 G/DL (ref 3.2–4.6)
ALBUMIN/GLOB SERPL: 1.5 (ref 1–1.9)
ALP SERPL-CCNC: 84 U/L (ref 40–129)
ALT SERPL-CCNC: 25 U/L (ref 8–55)
ANION GAP SERPL CALC-SCNC: 11 MMOL/L (ref 7–16)
AST SERPL-CCNC: 31 U/L (ref 15–37)
BILIRUB SERPL-MCNC: 0.5 MG/DL (ref 0–1.2)
BUN SERPL-MCNC: 11 MG/DL (ref 8–23)
CALCIUM SERPL-MCNC: 9.1 MG/DL (ref 8.8–10.2)
CHLORIDE SERPL-SCNC: 106 MMOL/L (ref 98–107)
CO2 SERPL-SCNC: 23 MMOL/L (ref 20–29)
CREAT SERPL-MCNC: 1.23 MG/DL (ref 0.8–1.3)
EST. AVERAGE GLUCOSE BLD GHB EST-MCNC: 135 MG/DL
GLOBULIN SER CALC-MCNC: 2.7 G/DL (ref 2.3–3.5)
GLUCOSE SERPL-MCNC: 135 MG/DL (ref 70–99)
HBA1C MFR BLD: 6.3 % (ref 0–5.6)
POTASSIUM SERPL-SCNC: 4.1 MMOL/L (ref 3.5–5.1)
PROT SERPL-MCNC: 6.7 G/DL (ref 6.3–8.2)
SODIUM SERPL-SCNC: 140 MMOL/L (ref 136–145)

## 2024-10-30 RX ORDER — CALCIUM CARBONATE/VITAMIN D3 500-10/5ML
LIQUID (ML) ORAL
Qty: 90 CAPSULE | Refills: 1 | Status: SHIPPED | OUTPATIENT
Start: 2024-10-30

## 2024-10-30 RX ORDER — TAMSULOSIN HYDROCHLORIDE 0.4 MG/1
0.4 CAPSULE ORAL DAILY
Qty: 90 CAPSULE | Refills: 1 | Status: SHIPPED | OUTPATIENT
Start: 2024-10-30

## 2024-10-30 RX ORDER — ROSUVASTATIN CALCIUM 10 MG/1
10 TABLET, COATED ORAL DAILY
Qty: 90 TABLET | Refills: 1 | Status: SHIPPED | OUTPATIENT
Start: 2024-10-30

## 2024-10-30 ASSESSMENT — ENCOUNTER SYMPTOMS
ABDOMINAL PAIN: 0
CONSTIPATION: 1
BLOOD IN STOOL: 0
CHEST TIGHTNESS: 0
SHORTNESS OF BREATH: 0

## 2024-10-30 NOTE — PROGRESS NOTES
normal.                  An electronic signature was used to authenticate this note.    --Erickson Camilo MD

## 2024-11-05 RX ORDER — APIXABAN 5 MG/1
TABLET, FILM COATED ORAL
Qty: 180 TABLET | Refills: 3 | Status: SHIPPED | OUTPATIENT
Start: 2024-11-05

## 2024-11-05 RX ORDER — FLECAINIDE ACETATE 100 MG/1
100 TABLET ORAL 2 TIMES DAILY
Qty: 180 TABLET | Refills: 3 | Status: SHIPPED | OUTPATIENT
Start: 2024-11-05

## 2024-11-05 NOTE — TELEPHONE ENCOUNTER
Requested Prescriptions     Pending Prescriptions Disp Refills    ELIQUIS 5 MG TABS tablet [Pharmacy Med Name: Eliquis 5 MG Oral Tablet] 180 tablet 0     Sig: TAKE 1 TABLET BY MOUTH IN THE MORNING AND 1 IN THE EVENING    flecainide (TAMBOCOR) 100 MG tablet [Pharmacy Med Name: Flecainide Acetate 100 MG Oral Tablet] 180 tablet 0     Sig: Take 1 tablet by mouth twice daily    Verified rx in last OV date 9/18/24. Pharmacy confirmed. Erx as requested

## 2024-12-12 ENCOUNTER — TELEMEDICINE (OUTPATIENT)
Dept: FAMILY MEDICINE CLINIC | Facility: CLINIC | Age: 79
End: 2024-12-12

## 2024-12-12 DIAGNOSIS — E78.2 MIXED HYPERLIPIDEMIA: ICD-10-CM

## 2024-12-12 DIAGNOSIS — Z00.00 ENCOUNTER FOR ANNUAL WELLNESS EXAM IN MEDICARE PATIENT: Primary | ICD-10-CM

## 2024-12-12 RX ORDER — ROSUVASTATIN CALCIUM 10 MG/1
10 TABLET, COATED ORAL DAILY
Qty: 90 TABLET | Refills: 1 | Status: SHIPPED | OUTPATIENT
Start: 2024-12-12

## 2024-12-12 ASSESSMENT — PATIENT HEALTH QUESTIONNAIRE - PHQ9
SUM OF ALL RESPONSES TO PHQ QUESTIONS 1-9: 0
1. LITTLE INTEREST OR PLEASURE IN DOING THINGS: NOT AT ALL
SUM OF ALL RESPONSES TO PHQ QUESTIONS 1-9: 0
2. FEELING DOWN, DEPRESSED OR HOPELESS: NOT AT ALL
SUM OF ALL RESPONSES TO PHQ9 QUESTIONS 1 & 2: 0

## 2024-12-12 NOTE — PROGRESS NOTES
Medicare Annual Wellness Visit    Hector Campbell is here for Medicare AWV    Assessment & Plan   1. Encounter for annual wellness exam in Medicare patient  Doing well at baseline.   Will consider RSV shot but wants to check with pharmacy first to see if he's had that shot already.    2. Mixed hyperlipidemia  Stable, continue current regimen.     Also, he stopped tamsulosin because it was making his penis sore. States he feels better without.     Recommendations for Preventive Services Due: see orders and patient instructions/AVS.  Recommended screening schedule for the next 5-10 years is provided to the patient in written form: see Patient Instructions/AVS.     Return in about 6 weeks (around 1/23/2025) for Already scheduled.     Subjective       Patient's complete Health Risk Assessment and screening values have been reviewed and are found in Flowsheets. The following problems were reviewed today and where indicated follow up appointments were made and/or referrals ordered.    HM:  COVID: Has had multiple boosters in the past, has COVID 3Y ago.   RSV: Will consider doing this.     Positive Risk Factor Screenings with Interventions:              Inactivity:  On average, how many days per week do you engage in moderate to strenuous exercise (like a brisk walk)?: 0 days (!) Abnormal  On average, how many minutes do you engage in exercise at this level?: 0 min  Interventions:  Activity as tolerated     Abnormal BMI (obese):  There is no height or weight on file to calculate BMI. (!) Abnormal  Interventions:  As above.                    Also asks for refill on his crestor while he has me on the phone.         Objective    Patient-Reported Vitals  No data recorded               Allergies   Allergen Reactions    Penicillins Other (See Comments)    Amoxicillin Rash     Prior to Visit Medications    Medication Sig Taking? Authorizing Provider   rosuvastatin (CRESTOR) 10 MG tablet Take 1 tablet by mouth daily TAKE 1 TABLET BY

## 2024-12-12 NOTE — PATIENT INSTRUCTIONS
Learning About Being Active as an Older Adult  Why is being active important as you get older?     Being active is one of the best things you can do for your health. And it's never too late to start. Being active--or getting active, if you aren't already--has definite benefits. It can:  Give you more energy,  Keep your mind sharp.  Improve balance to reduce your risk of falls.  Help you manage chronic illness with fewer medicines.  No matter how old you are, how fit you are, or what health problems you have, there is a form of activity that will work for you. And the more physical activity you can do, the better your overall health will be.  What kinds of activity can help you stay healthy?  Being more active will make your daily activities easier. Physical activity includes planned exercise and things you do in daily life. There are four types of activity:  Aerobic.  Doing aerobic activity makes your heart and lungs strong.  Includes walking, dancing, and gardening.  Aim for at least 2½ hours spread throughout the week.  It improves your energy and can help you sleep better.  Muscle-strengthening.  This type of activity can help maintain muscle and strengthen bones.  Includes climbing stairs, using resistance bands, and lifting or carrying heavy loads.  Aim for at least twice a week.  It can help protect the knees and other joints.  Stretching.  Stretching gives you better range of motion in joints and muscles.  Includes upper arm stretches, calf stretches, and gentle yoga.  Aim for at least twice a week, preferably after your muscles are warmed up from other activities.  It can help you function better in daily life.  Balancing.  This helps you stay coordinated and have good posture.  Includes heel-to-toe walking, sharon chi, and certain types of yoga.  Aim for at least 3 days a week.  It can reduce your risk of falling.  Even if you have a hard time meeting the recommendations, it's better to be more active

## 2025-01-14 ENCOUNTER — APPOINTMENT (OUTPATIENT)
Dept: URBAN - METROPOLITAN AREA CLINIC 25 | Facility: CLINIC | Age: 80
Setting detail: DERMATOLOGY
End: 2025-01-14

## 2025-01-14 DIAGNOSIS — D22 MELANOCYTIC NEVI: ICD-10-CM

## 2025-01-14 DIAGNOSIS — L57.0 ACTINIC KERATOSIS: ICD-10-CM

## 2025-01-14 DIAGNOSIS — D18.0 HEMANGIOMA: ICD-10-CM

## 2025-01-14 DIAGNOSIS — Z85.828 PERSONAL HISTORY OF OTHER MALIGNANT NEOPLASM OF SKIN: ICD-10-CM

## 2025-01-14 DIAGNOSIS — L82.1 OTHER SEBORRHEIC KERATOSIS: ICD-10-CM

## 2025-01-14 DIAGNOSIS — L57.8 OTHER SKIN CHANGES DUE TO CHRONIC EXPOSURE TO NONIONIZING RADIATION: ICD-10-CM

## 2025-01-14 PROBLEM — D22.0 MELANOCYTIC NEVI OF LIP: Status: ACTIVE | Noted: 2025-01-14

## 2025-01-14 PROBLEM — D18.01 HEMANGIOMA OF SKIN AND SUBCUTANEOUS TISSUE: Status: ACTIVE | Noted: 2025-01-14

## 2025-01-14 PROCEDURE — 99213 OFFICE O/P EST LOW 20 MIN: CPT | Mod: 25

## 2025-01-14 PROCEDURE — ? COUNSELING

## 2025-01-14 PROCEDURE — ? LIQUID NITROGEN

## 2025-01-14 PROCEDURE — 17004 DESTROY PREMAL LESIONS 15/>: CPT

## 2025-01-14 ASSESSMENT — LOCATION DETAILED DESCRIPTION DERM
LOCATION DETAILED: EPIGASTRIC SKIN
LOCATION DETAILED: RIGHT MEDIAL SUPERIOR CHEST
LOCATION DETAILED: LEFT MID PREAURICULAR CHEEK
LOCATION DETAILED: LEFT RADIAL DORSAL HAND
LOCATION DETAILED: RIGHT LATERAL ZYGOMA
LOCATION DETAILED: RIGHT DISTAL DORSAL FOREARM
LOCATION DETAILED: LEFT CENTRAL TEMPLE
LOCATION DETAILED: LEFT SUPERIOR LATERAL BUCCAL CHEEK
LOCATION DETAILED: LEFT INFERIOR CENTRAL MALAR CHEEK
LOCATION DETAILED: LEFT SUPERIOR FOREHEAD
LOCATION DETAILED: LEFT SUPERIOR HELIX
LOCATION DETAILED: LEFT FOREHEAD
LOCATION DETAILED: RIGHT INFERIOR MEDIAL UPPER BACK
LOCATION DETAILED: RIGHT CENTRAL MALAR CHEEK
LOCATION DETAILED: RIGHT UPPER CUTANEOUS LIP
LOCATION DETAILED: LEFT INFERIOR PREAURICULAR CHEEK
LOCATION DETAILED: NASAL DORSUM
LOCATION DETAILED: LEFT DISTAL DORSAL FOREARM
LOCATION DETAILED: RIGHT PROXIMAL DORSAL FOREARM
LOCATION DETAILED: LEFT ULNAR DORSAL HAND
LOCATION DETAILED: RIGHT ULNAR DORSAL HAND
LOCATION DETAILED: RIGHT DISTAL POSTERIOR UPPER ARM
LOCATION DETAILED: LEFT LATERAL INFERIOR CHEST
LOCATION DETAILED: RIGHT LATERAL TEMPLE
LOCATION DETAILED: RIGHT MID-UPPER BACK

## 2025-01-14 ASSESSMENT — LOCATION ZONE DERM
LOCATION ZONE: NOSE
LOCATION ZONE: EAR
LOCATION ZONE: ARM
LOCATION ZONE: HAND
LOCATION ZONE: LIP
LOCATION ZONE: TRUNK
LOCATION ZONE: FACE

## 2025-01-14 ASSESSMENT — LOCATION SIMPLE DESCRIPTION DERM
LOCATION SIMPLE: LEFT CHEEK
LOCATION SIMPLE: CHEST
LOCATION SIMPLE: RIGHT HAND
LOCATION SIMPLE: RIGHT UPPER BACK
LOCATION SIMPLE: RIGHT UPPER ARM
LOCATION SIMPLE: LEFT FOREHEAD
LOCATION SIMPLE: LEFT TEMPLE
LOCATION SIMPLE: RIGHT ZYGOMA
LOCATION SIMPLE: RIGHT TEMPLE
LOCATION SIMPLE: RIGHT FOREARM
LOCATION SIMPLE: ABDOMEN
LOCATION SIMPLE: LEFT EAR
LOCATION SIMPLE: LEFT HAND
LOCATION SIMPLE: NOSE
LOCATION SIMPLE: RIGHT CHEEK
LOCATION SIMPLE: LEFT FOREARM
LOCATION SIMPLE: RIGHT LIP

## 2025-01-28 ENCOUNTER — OFFICE VISIT (OUTPATIENT)
Dept: FAMILY MEDICINE CLINIC | Facility: CLINIC | Age: 80
End: 2025-01-28

## 2025-01-28 VITALS
DIASTOLIC BLOOD PRESSURE: 80 MMHG | BODY MASS INDEX: 34.07 KG/M2 | SYSTOLIC BLOOD PRESSURE: 140 MMHG | WEIGHT: 230 LBS | HEIGHT: 69 IN | HEART RATE: 81 BPM

## 2025-01-28 DIAGNOSIS — R35.0 BENIGN PROSTATIC HYPERPLASIA WITH URINARY FREQUENCY: Primary | ICD-10-CM

## 2025-01-28 DIAGNOSIS — I10 ESSENTIAL (PRIMARY) HYPERTENSION: ICD-10-CM

## 2025-01-28 DIAGNOSIS — N40.1 BENIGN PROSTATIC HYPERPLASIA WITH URINARY FREQUENCY: Primary | ICD-10-CM

## 2025-01-28 DIAGNOSIS — K59.03 DRUG-INDUCED CONSTIPATION: ICD-10-CM

## 2025-01-28 DIAGNOSIS — J34.89 DRY NARES: ICD-10-CM

## 2025-01-28 DIAGNOSIS — I48.0 PAF (PAROXYSMAL ATRIAL FIBRILLATION) (HCC): ICD-10-CM

## 2025-01-28 DIAGNOSIS — E78.2 MIXED HYPERLIPIDEMIA: ICD-10-CM

## 2025-01-28 DIAGNOSIS — E11.9 CONTROLLED TYPE 2 DIABETES MELLITUS WITHOUT COMPLICATION, WITHOUT LONG-TERM CURRENT USE OF INSULIN (HCC): ICD-10-CM

## 2025-01-28 RX ORDER — TAMSULOSIN HYDROCHLORIDE 0.4 MG/1
0.4 CAPSULE ORAL DAILY
Qty: 90 CAPSULE | Refills: 1 | Status: SHIPPED | OUTPATIENT
Start: 2025-01-28

## 2025-01-28 SDOH — ECONOMIC STABILITY: FOOD INSECURITY: WITHIN THE PAST 12 MONTHS, YOU WORRIED THAT YOUR FOOD WOULD RUN OUT BEFORE YOU GOT MONEY TO BUY MORE.: PATIENT DECLINED

## 2025-01-28 SDOH — ECONOMIC STABILITY: FOOD INSECURITY: WITHIN THE PAST 12 MONTHS, THE FOOD YOU BOUGHT JUST DIDN'T LAST AND YOU DIDN'T HAVE MONEY TO GET MORE.: PATIENT DECLINED

## 2025-01-28 ASSESSMENT — ENCOUNTER SYMPTOMS
ABDOMINAL PAIN: 0
SHORTNESS OF BREATH: 0
CHEST TIGHTNESS: 0
BLOOD IN STOOL: 0
CONSTIPATION: 1

## 2025-01-28 ASSESSMENT — PATIENT HEALTH QUESTIONNAIRE - PHQ9
SUM OF ALL RESPONSES TO PHQ9 QUESTIONS 1 & 2: 0
SUM OF ALL RESPONSES TO PHQ QUESTIONS 1-9: 0
SUM OF ALL RESPONSES TO PHQ QUESTIONS 1-9: 0
1. LITTLE INTEREST OR PLEASURE IN DOING THINGS: NOT AT ALL
SUM OF ALL RESPONSES TO PHQ QUESTIONS 1-9: 0
2. FEELING DOWN, DEPRESSED OR HOPELESS: NOT AT ALL
SUM OF ALL RESPONSES TO PHQ QUESTIONS 1-9: 0

## 2025-01-28 NOTE — PROGRESS NOTES
Constitutional:  Negative for chills and fever.   HENT:  Positive for nosebleeds.    Respiratory:  Negative for chest tightness and shortness of breath.    Gastrointestinal:  Positive for constipation. Negative for abdominal pain and blood in stool.   Genitourinary:  Negative for hematuria.          Objective   Physical Exam  Vitals and nursing note reviewed.   Constitutional:       General: He is not in acute distress.     Appearance: Normal appearance. He is not ill-appearing.   HENT:      Head: Normocephalic and atraumatic.      Right Ear: External ear normal.      Left Ear: External ear normal.      Nose:      Comments: BL nares dry, R nostril with anteriolateral dried blood.      Mouth/Throat:      Mouth: Mucous membranes are moist.   Eyes:      General: No scleral icterus.        Right eye: No discharge.         Left eye: No discharge.      Extraocular Movements: Extraocular movements intact.      Pupils: Pupils are equal, round, and reactive to light.   Cardiovascular:      Rate and Rhythm: Normal rate and regular rhythm.      Pulses: Normal pulses.      Heart sounds: No murmur heard.     No friction rub. No gallop.      Comments: RRR pulse about 65  Pulmonary:      Effort: Pulmonary effort is normal. No respiratory distress.      Breath sounds: Normal breath sounds.   Abdominal:      General: Abdomen is flat. Bowel sounds are normal.      Palpations: Abdomen is soft.      Comments: Obese   Musculoskeletal:         General: No swelling or tenderness. Normal range of motion.      Cervical back: Normal range of motion and neck supple. No rigidity.      Right lower leg: No edema.      Left lower leg: No edema.   Skin:     General: Skin is warm and dry.      Coloration: Skin is not pale.   Neurological:      General: No focal deficit present.      Mental Status: He is alert and oriented to person, place, and time. Mental status is at baseline.   Psychiatric:         Mood and Affect: Mood normal.

## 2025-03-20 NOTE — PROGRESS NOTES
taking: Reported on 3/21/2025) 90 capsule 1    Magnesium Oxide 400 MG CAPS Take one every morning with a large glass of water. (Patient not taking: Reported on 3/21/2025) 90 capsule 1     No current facility-administered medications for this visit.     Allergies   Allergen Reactions    Penicillins Other (See Comments)    Amoxicillin Rash     Past Medical History:   Diagnosis Date    Allergic rhinitis     takes allegra    Arrhythmia     pt states \"heart will skip a beat\"    Arthritis of right knee 6/2/2020    Controlled type 2 diabetes mellitus without complication, without long-term current use of insulin ([facility]) 3/2/2017    GERD (gastroesophageal reflux disease)     Hearing loss of both ears     pt states left ear worse than right    Heel pain, chronic, right 9/7/2018    Hypercholesterolemia     managed w/med    Laceration of left hand without foreign body 12/3/2019    Obesity (BMI 30.0-34.9) 9/19/2017    Perforation of left tympanic membrane     Primary hypertension 2/1/2022    SCC (squamous cell carcinoma) 1/6/2020    Sleep apnea     pt states not able to tolerate cpap    Vertigo, benign paroxysmal      Past Surgical History:   Procedure Laterality Date    COLONOSCOPY      Clear, 10 year repeat 2025    HEENT      L ear tympanoplasty - Nirali    ORTHOPEDIC SURGERY Left 2005    thumb tendon repair     Family History   Problem Relation Age of Onset    Stroke Father     Alzheimer's Disease Mother     Diabetes Brother      Social History     Tobacco Use    Smoking status: Never     Passive exposure: Past    Smokeless tobacco: Never   Substance Use Topics    Alcohol use: No     PHYSICAL EXAM:    BP (!) 150/84   Pulse 78   Ht 1.753 m (5' 9\")   BMI 33.97 kg/m²      Wt Readings from Last 5 Encounters:   01/28/25 104.3 kg (230 lb)   10/30/24 102.5 kg (226 lb)   09/18/24 104.3 kg (230 lb)   07/30/24 102.1 kg (225 lb)   03/14/24 104.3 kg (230 lb)       BP Readings from Last 5 Encounters:   03/21/25 (!) 150/84   01/28/25

## 2025-03-21 ENCOUNTER — OFFICE VISIT (OUTPATIENT)
Age: 80
End: 2025-03-21
Payer: MEDICARE

## 2025-03-21 VITALS
SYSTOLIC BLOOD PRESSURE: 150 MMHG | BODY MASS INDEX: 33.97 KG/M2 | HEIGHT: 69 IN | DIASTOLIC BLOOD PRESSURE: 84 MMHG | HEART RATE: 78 BPM

## 2025-03-21 DIAGNOSIS — I48.0 PAF (PAROXYSMAL ATRIAL FIBRILLATION) (HCC): Primary | ICD-10-CM

## 2025-03-21 PROCEDURE — 93000 ELECTROCARDIOGRAM COMPLETE: CPT | Performed by: PHYSICIAN ASSISTANT

## 2025-03-21 PROCEDURE — 3079F DIAST BP 80-89 MM HG: CPT | Performed by: PHYSICIAN ASSISTANT

## 2025-03-21 PROCEDURE — 1159F MED LIST DOCD IN RCRD: CPT | Performed by: PHYSICIAN ASSISTANT

## 2025-03-21 PROCEDURE — G8417 CALC BMI ABV UP PARAM F/U: HCPCS | Performed by: PHYSICIAN ASSISTANT

## 2025-03-21 PROCEDURE — 3077F SYST BP >= 140 MM HG: CPT | Performed by: PHYSICIAN ASSISTANT

## 2025-03-21 PROCEDURE — 1036F TOBACCO NON-USER: CPT | Performed by: PHYSICIAN ASSISTANT

## 2025-03-21 PROCEDURE — 1160F RVW MEDS BY RX/DR IN RCRD: CPT | Performed by: PHYSICIAN ASSISTANT

## 2025-03-21 PROCEDURE — G8427 DOCREV CUR MEDS BY ELIG CLIN: HCPCS | Performed by: PHYSICIAN ASSISTANT

## 2025-03-21 PROCEDURE — 1126F AMNT PAIN NOTED NONE PRSNT: CPT | Performed by: PHYSICIAN ASSISTANT

## 2025-03-21 PROCEDURE — 1123F ACP DISCUSS/DSCN MKR DOCD: CPT | Performed by: PHYSICIAN ASSISTANT

## 2025-03-21 PROCEDURE — 99214 OFFICE O/P EST MOD 30 MIN: CPT | Performed by: PHYSICIAN ASSISTANT

## 2025-04-19 DIAGNOSIS — K59.03 DRUG-INDUCED CONSTIPATION: ICD-10-CM

## 2025-04-21 RX ORDER — LANOLIN ALCOHOL/MO/W.PET/CERES
CREAM (GRAM) TOPICAL
Qty: 90 TABLET | Refills: 0 | OUTPATIENT
Start: 2025-04-21

## 2025-04-29 ENCOUNTER — OFFICE VISIT (OUTPATIENT)
Dept: FAMILY MEDICINE CLINIC | Facility: CLINIC | Age: 80
End: 2025-04-29
Payer: MEDICARE

## 2025-04-29 ENCOUNTER — LAB (OUTPATIENT)
Dept: FAMILY MEDICINE CLINIC | Facility: CLINIC | Age: 80
End: 2025-04-29

## 2025-04-29 VITALS
HEIGHT: 69 IN | SYSTOLIC BLOOD PRESSURE: 136 MMHG | WEIGHT: 228 LBS | HEART RATE: 55 BPM | DIASTOLIC BLOOD PRESSURE: 67 MMHG | BODY MASS INDEX: 33.77 KG/M2

## 2025-04-29 DIAGNOSIS — R09.A2 GLOBUS PHARYNGEUS: ICD-10-CM

## 2025-04-29 DIAGNOSIS — E11.9 CONTROLLED TYPE 2 DIABETES MELLITUS WITHOUT COMPLICATION, WITHOUT LONG-TERM CURRENT USE OF INSULIN (HCC): Primary | ICD-10-CM

## 2025-04-29 DIAGNOSIS — I10 ESSENTIAL (PRIMARY) HYPERTENSION: ICD-10-CM

## 2025-04-29 DIAGNOSIS — I48.0 PAF (PAROXYSMAL ATRIAL FIBRILLATION) (HCC): ICD-10-CM

## 2025-04-29 DIAGNOSIS — E11.9 CONTROLLED TYPE 2 DIABETES MELLITUS WITHOUT COMPLICATION, WITHOUT LONG-TERM CURRENT USE OF INSULIN (HCC): ICD-10-CM

## 2025-04-29 DIAGNOSIS — E78.2 MIXED HYPERLIPIDEMIA: ICD-10-CM

## 2025-04-29 DIAGNOSIS — K59.03 DRUG-INDUCED CONSTIPATION: ICD-10-CM

## 2025-04-29 LAB
ALBUMIN SERPL-MCNC: 3.8 G/DL (ref 3.2–4.6)
ALBUMIN/GLOB SERPL: 1.5 (ref 1–1.9)
ALP SERPL-CCNC: 76 U/L (ref 40–129)
ALT SERPL-CCNC: 25 U/L (ref 8–55)
ANION GAP SERPL CALC-SCNC: 10 MMOL/L (ref 7–16)
AST SERPL-CCNC: 26 U/L (ref 15–37)
BASOPHILS # BLD: 0.05 K/UL (ref 0–0.2)
BASOPHILS NFR BLD: 0.8 % (ref 0–2)
BILIRUB SERPL-MCNC: 0.5 MG/DL (ref 0–1.2)
BUN SERPL-MCNC: 10 MG/DL (ref 8–23)
CALCIUM SERPL-MCNC: 9.1 MG/DL (ref 8.8–10.2)
CHLORIDE SERPL-SCNC: 107 MMOL/L (ref 98–107)
CO2 SERPL-SCNC: 23 MMOL/L (ref 20–29)
CREAT SERPL-MCNC: 1.06 MG/DL (ref 0.8–1.3)
CREAT UR-MCNC: 204 MG/DL (ref 39–259)
DIFFERENTIAL METHOD BLD: ABNORMAL
EOSINOPHIL # BLD: 0.17 K/UL (ref 0–0.8)
EOSINOPHIL NFR BLD: 2.9 % (ref 0.5–7.8)
ERYTHROCYTE [DISTWIDTH] IN BLOOD BY AUTOMATED COUNT: 14.1 % (ref 11.9–14.6)
EST. AVERAGE GLUCOSE BLD GHB EST-MCNC: 136 MG/DL
GLOBULIN SER CALC-MCNC: 2.6 G/DL (ref 2.3–3.5)
GLUCOSE SERPL-MCNC: 129 MG/DL (ref 70–99)
HBA1C MFR BLD: 6.4 % (ref 0–5.6)
HCT VFR BLD AUTO: 45 % (ref 41.1–50.3)
HGB BLD-MCNC: 14.9 G/DL (ref 13.6–17.2)
IMM GRANULOCYTES # BLD AUTO: 0.02 K/UL (ref 0–0.5)
IMM GRANULOCYTES NFR BLD AUTO: 0.3 % (ref 0–5)
LYMPHOCYTES # BLD: 1.4 K/UL (ref 0.5–4.6)
LYMPHOCYTES NFR BLD: 23.6 % (ref 13–44)
MCH RBC QN AUTO: 29.4 PG (ref 26.1–32.9)
MCHC RBC AUTO-ENTMCNC: 33.1 G/DL (ref 31.4–35)
MCV RBC AUTO: 88.9 FL (ref 82–102)
MICROALBUMIN UR-MCNC: 1.33 MG/DL (ref 0–20)
MICROALBUMIN/CREAT UR-RTO: 7 MG/G (ref 0–30)
MONOCYTES # BLD: 0.43 K/UL (ref 0.1–1.3)
MONOCYTES NFR BLD: 7.2 % (ref 4–12)
NEUTS SEG # BLD: 3.87 K/UL (ref 1.7–8.2)
NEUTS SEG NFR BLD: 65.2 % (ref 43–78)
NRBC # BLD: 0 K/UL (ref 0–0.2)
PLATELET # BLD AUTO: 146 K/UL (ref 150–450)
PMV BLD AUTO: 11.7 FL (ref 9.4–12.3)
POTASSIUM SERPL-SCNC: 4.2 MMOL/L (ref 3.5–5.1)
PROT SERPL-MCNC: 6.4 G/DL (ref 6.3–8.2)
RBC # BLD AUTO: 5.06 M/UL (ref 4.23–5.6)
SODIUM SERPL-SCNC: 141 MMOL/L (ref 136–145)
TSH, 3RD GENERATION: 2.56 UIU/ML (ref 0.27–4.2)
WBC # BLD AUTO: 5.9 K/UL (ref 4.3–11.1)

## 2025-04-29 PROCEDURE — G8427 DOCREV CUR MEDS BY ELIG CLIN: HCPCS | Performed by: FAMILY MEDICINE

## 2025-04-29 PROCEDURE — 1036F TOBACCO NON-USER: CPT | Performed by: FAMILY MEDICINE

## 2025-04-29 PROCEDURE — G2211 COMPLEX E/M VISIT ADD ON: HCPCS | Performed by: FAMILY MEDICINE

## 2025-04-29 PROCEDURE — 1123F ACP DISCUSS/DSCN MKR DOCD: CPT | Performed by: FAMILY MEDICINE

## 2025-04-29 PROCEDURE — 99214 OFFICE O/P EST MOD 30 MIN: CPT | Performed by: FAMILY MEDICINE

## 2025-04-29 PROCEDURE — 1160F RVW MEDS BY RX/DR IN RCRD: CPT | Performed by: FAMILY MEDICINE

## 2025-04-29 PROCEDURE — 1159F MED LIST DOCD IN RCRD: CPT | Performed by: FAMILY MEDICINE

## 2025-04-29 PROCEDURE — G8417 CALC BMI ABV UP PARAM F/U: HCPCS | Performed by: FAMILY MEDICINE

## 2025-04-29 PROCEDURE — 3078F DIAST BP <80 MM HG: CPT | Performed by: FAMILY MEDICINE

## 2025-04-29 PROCEDURE — 3075F SYST BP GE 130 - 139MM HG: CPT | Performed by: FAMILY MEDICINE

## 2025-04-29 RX ORDER — MAGNESIUM OXIDE 400 MG/1
400 TABLET ORAL DAILY
Qty: 90 TABLET | Refills: 1 | Status: SHIPPED | OUTPATIENT
Start: 2025-04-29

## 2025-04-29 RX ORDER — ROSUVASTATIN CALCIUM 10 MG/1
10 TABLET, COATED ORAL DAILY
Qty: 90 TABLET | Refills: 1 | Status: SHIPPED | OUTPATIENT
Start: 2025-04-29

## 2025-04-29 ASSESSMENT — ENCOUNTER SYMPTOMS
SHORTNESS OF BREATH: 0
CONSTIPATION: 0
CHEST TIGHTNESS: 0
ABDOMINAL PAIN: 0
BLOOD IN STOOL: 0

## 2025-04-29 NOTE — PROGRESS NOTES
due    Review of Systems   Constitutional:  Negative for chills and fever.   HENT:  Negative for nosebleeds.    Respiratory:  Negative for chest tightness and shortness of breath.    Gastrointestinal:  Negative for abdominal pain, blood in stool and constipation.   Genitourinary:  Negative for hematuria.          Objective   Physical Exam  Vitals and nursing note reviewed.   Constitutional:       General: He is not in acute distress.     Appearance: Normal appearance. He is not ill-appearing.   HENT:      Head: Normocephalic and atraumatic.      Right Ear: External ear normal.      Left Ear: External ear normal.      Mouth/Throat:      Mouth: Mucous membranes are moist.   Eyes:      General: No scleral icterus.        Right eye: No discharge.         Left eye: No discharge.      Extraocular Movements: Extraocular movements intact.      Pupils: Pupils are equal, round, and reactive to light.   Cardiovascular:      Rate and Rhythm: Normal rate and regular rhythm.      Pulses: Normal pulses.      Heart sounds: No murmur heard.     No friction rub. No gallop.      Comments: RRR pulse about 65  Pulmonary:      Effort: Pulmonary effort is normal. No respiratory distress.      Breath sounds: Normal breath sounds.   Abdominal:      General: Abdomen is flat. Bowel sounds are normal.      Palpations: Abdomen is soft.      Comments: Obese   Musculoskeletal:         General: No swelling or tenderness. Normal range of motion.      Cervical back: Normal range of motion and neck supple. No rigidity.      Right lower leg: No edema.      Left lower leg: No edema.   Skin:     General: Skin is warm and dry.      Coloration: Skin is not pale.   Neurological:      General: No focal deficit present.      Mental Status: He is alert and oriented to person, place, and time. Mental status is at baseline.   Psychiatric:         Mood and Affect: Mood normal.         Behavior: Behavior normal.         Thought Content: Thought content normal.

## 2025-04-30 ENCOUNTER — RESULTS FOLLOW-UP (OUTPATIENT)
Dept: FAMILY MEDICINE CLINIC | Facility: CLINIC | Age: 80
End: 2025-04-30

## 2025-05-03 NOTE — PROGRESS NOTES
[address] [id number] [address], [facility], [address]  PHONE: [phone]  1945    SUBJECTIVE:   Saleem Albright is a 69 y.o. male seen for a follow up visit regarding the following:     Chief Complaint   Patient presents with    Follow-up    Irregular Heart Beat       HPI:    Saleem Albright is a very pleasant 69 y.o. male with a past medical and cardiac history significant for HLP, no prior cardiac problems, DM type 2 diet controlled, no prior CVA, and pAF and presents for follow up. Pt presents back again today and is doing well on flecainide. He had some rapid HR, but wore monitor that was normal, no chest pain, SOB. Cardiac PMH: (Old records have been reviewed and summarized below)   TTE (12/18): EF 50-55%, mild LAE      Reviewed office note Dr. Manny Herrmann 1/30/23    Past Medical History, Past Surgical History, Family history, Social History, and Medications were all reviewed with the patient today and updated as necessary. Current Outpatient Medications   Medication Sig Dispense Refill    apixaban (ELIQUIS) 5 MG TABS tablet Take 1 tablet by mouth in the morning and 1 tablet in the evening. 180 tablet 3    flecainide (TAMBOCOR) 100 MG tablet Take 1 tablet by mouth 2 times daily TAKE 1 TABLET BY MOUTH TWICE A  tablet 3    rosuvastatin (CRESTOR) 10 MG tablet Take 1 tablet by mouth daily TAKE 1 TABLET BY MOUTH EVERY DAY 90 tablet 1    tamsulosin (FLOMAX) 0.4 MG capsule Take 1 capsule by mouth daily 90 capsule 1    aspirin 81 MG EC tablet Take 1 tablet by mouth daily      Flaxseed, Linseed, (FLAX SEED OIL) 1000 MG CAPS Take 1 capsule by mouth daily      metoprolol succinate (TOPROL XL) 25 MG extended release tablet Take 0.5 tablets by mouth as needed Indications: ONLY TAKING PRN       No current facility-administered medications for this visit.      Allergies   Allergen Reactions    Penicillins Other (See Comments)    Amoxicillin Rash     [unfilled]  Past Medical History: 100

## 2025-07-15 ENCOUNTER — LAB (OUTPATIENT)
Dept: FAMILY MEDICINE CLINIC | Facility: CLINIC | Age: 80
End: 2025-07-15

## 2025-07-15 ENCOUNTER — OFFICE VISIT (OUTPATIENT)
Dept: FAMILY MEDICINE CLINIC | Facility: CLINIC | Age: 80
End: 2025-07-15

## 2025-07-15 ENCOUNTER — APPOINTMENT (OUTPATIENT)
Dept: URBAN - METROPOLITAN AREA CLINIC 25 | Facility: CLINIC | Age: 80
Setting detail: DERMATOLOGY
End: 2025-07-15

## 2025-07-15 VITALS
SYSTOLIC BLOOD PRESSURE: 130 MMHG | DIASTOLIC BLOOD PRESSURE: 78 MMHG | HEIGHT: 69 IN | WEIGHT: 228 LBS | BODY MASS INDEX: 33.77 KG/M2

## 2025-07-15 DIAGNOSIS — S40.022A HEMATOMA OF LEFT UPPER EXTREMITY: ICD-10-CM

## 2025-07-15 DIAGNOSIS — I48.0 PAF (PAROXYSMAL ATRIAL FIBRILLATION) (HCC): ICD-10-CM

## 2025-07-15 DIAGNOSIS — D18.0 HEMANGIOMA: ICD-10-CM

## 2025-07-15 DIAGNOSIS — M70.22 OLECRANON BURSITIS, LEFT ELBOW: Primary | ICD-10-CM

## 2025-07-15 DIAGNOSIS — L57.0 ACTINIC KERATOSIS: ICD-10-CM

## 2025-07-15 DIAGNOSIS — D22 MELANOCYTIC NEVI: ICD-10-CM

## 2025-07-15 DIAGNOSIS — L82.1 OTHER SEBORRHEIC KERATOSIS: ICD-10-CM

## 2025-07-15 DIAGNOSIS — Z85.828 PERSONAL HISTORY OF OTHER MALIGNANT NEOPLASM OF SKIN: ICD-10-CM

## 2025-07-15 DIAGNOSIS — L57.8 OTHER SKIN CHANGES DUE TO CHRONIC EXPOSURE TO NONIONIZING RADIATION: ICD-10-CM

## 2025-07-15 PROBLEM — D18.01 HEMANGIOMA OF SKIN AND SUBCUTANEOUS TISSUE: Status: ACTIVE | Noted: 2025-07-15

## 2025-07-15 PROBLEM — D22.0 MELANOCYTIC NEVI OF LIP: Status: ACTIVE | Noted: 2025-07-15

## 2025-07-15 LAB
BASOPHILS # BLD: 0.06 K/UL (ref 0–0.2)
BASOPHILS NFR BLD: 0.9 % (ref 0–2)
DIFFERENTIAL METHOD BLD: NORMAL
EOSINOPHIL # BLD: 0.21 K/UL (ref 0–0.8)
EOSINOPHIL NFR BLD: 3.3 % (ref 0.5–7.8)
ERYTHROCYTE [DISTWIDTH] IN BLOOD BY AUTOMATED COUNT: 13.8 % (ref 11.9–14.6)
HCT VFR BLD AUTO: 43.8 % (ref 41.1–50.3)
HGB BLD-MCNC: 14.5 G/DL (ref 13.6–17.2)
IMM GRANULOCYTES # BLD AUTO: 0.01 K/UL (ref 0–0.5)
IMM GRANULOCYTES NFR BLD AUTO: 0.2 % (ref 0–5)
LYMPHOCYTES # BLD: 1.35 K/UL (ref 0.5–4.6)
LYMPHOCYTES NFR BLD: 21.1 % (ref 13–44)
MCH RBC QN AUTO: 30.5 PG (ref 26.1–32.9)
MCHC RBC AUTO-ENTMCNC: 33.1 G/DL (ref 31.4–35)
MCV RBC AUTO: 92.2 FL (ref 82–102)
MONOCYTES # BLD: 0.47 K/UL (ref 0.1–1.3)
MONOCYTES NFR BLD: 7.3 % (ref 4–12)
NEUTS SEG # BLD: 4.3 K/UL (ref 1.7–8.2)
NEUTS SEG NFR BLD: 67.2 % (ref 43–78)
NRBC # BLD: 0 K/UL (ref 0–0.2)
PLATELET # BLD AUTO: 166 K/UL (ref 150–450)
PMV BLD AUTO: 12.3 FL (ref 9.4–12.3)
RBC # BLD AUTO: 4.75 M/UL (ref 4.23–5.6)
WBC # BLD AUTO: 6.4 K/UL (ref 4.3–11.1)

## 2025-07-15 PROCEDURE — ? COUNSELING

## 2025-07-15 PROCEDURE — ? LIQUID NITROGEN

## 2025-07-15 ASSESSMENT — LOCATION SIMPLE DESCRIPTION DERM
LOCATION SIMPLE: RIGHT FOREHEAD
LOCATION SIMPLE: LEFT CHEEK
LOCATION SIMPLE: LEFT TEMPLE
LOCATION SIMPLE: LEFT EAR
LOCATION SIMPLE: RIGHT TEMPLE
LOCATION SIMPLE: LEFT FOREHEAD
LOCATION SIMPLE: RIGHT HAND
LOCATION SIMPLE: RIGHT FOREARM
LOCATION SIMPLE: ABDOMEN
LOCATION SIMPLE: RIGHT CHEEK
LOCATION SIMPLE: RIGHT UPPER BACK
LOCATION SIMPLE: RIGHT LIP
LOCATION SIMPLE: NOSE
LOCATION SIMPLE: SCALP
LOCATION SIMPLE: LEFT WRIST
LOCATION SIMPLE: LEFT FOREARM
LOCATION SIMPLE: CHEST
LOCATION SIMPLE: LEFT HAND

## 2025-07-15 ASSESSMENT — ENCOUNTER SYMPTOMS
ABDOMINAL PAIN: 0
CONSTIPATION: 0
SHORTNESS OF BREATH: 0
BLOOD IN STOOL: 0
CHEST TIGHTNESS: 0

## 2025-07-15 ASSESSMENT — LOCATION DETAILED DESCRIPTION DERM
LOCATION DETAILED: EPIGASTRIC SKIN
LOCATION DETAILED: RIGHT MEDIAL MALAR CHEEK
LOCATION DETAILED: RIGHT CENTRAL TEMPLE
LOCATION DETAILED: LEFT INFERIOR CENTRAL MALAR CHEEK
LOCATION DETAILED: RIGHT INFERIOR MEDIAL UPPER BACK
LOCATION DETAILED: RIGHT UPPER CUTANEOUS LIP
LOCATION DETAILED: RIGHT INFERIOR PREAURICULAR CHEEK
LOCATION DETAILED: RIGHT INFERIOR FOREHEAD
LOCATION DETAILED: LEFT INFERIOR LATERAL MALAR CHEEK
LOCATION DETAILED: RIGHT MID-UPPER BACK
LOCATION DETAILED: RIGHT LATERAL BUCCAL CHEEK
LOCATION DETAILED: RIGHT SUPERIOR LATERAL BUCCAL CHEEK
LOCATION DETAILED: LEFT INFERIOR FOREHEAD
LOCATION DETAILED: LEFT MID PREAURICULAR CHEEK
LOCATION DETAILED: RIGHT RADIAL DORSAL HAND
LOCATION DETAILED: LEFT DISTAL DORSAL FOREARM
LOCATION DETAILED: LEFT DORSAL WRIST
LOCATION DETAILED: LEFT INFERIOR POSTAURICULAR SKIN
LOCATION DETAILED: LEFT SUPERIOR FOREHEAD
LOCATION DETAILED: LEFT ULNAR DORSAL HAND
LOCATION DETAILED: NASAL DORSUM
LOCATION DETAILED: RIGHT PROXIMAL DORSAL FOREARM
LOCATION DETAILED: RIGHT MEDIAL SUPERIOR CHEST
LOCATION DETAILED: LEFT INFERIOR HELIX
LOCATION DETAILED: LEFT LATERAL INFERIOR CHEST
LOCATION DETAILED: LEFT CENTRAL TEMPLE
LOCATION DETAILED: LEFT RADIAL DORSAL HAND
LOCATION DETAILED: RIGHT INFERIOR LATERAL MALAR CHEEK

## 2025-07-15 ASSESSMENT — LOCATION ZONE DERM
LOCATION ZONE: NOSE
LOCATION ZONE: HAND
LOCATION ZONE: FACE
LOCATION ZONE: ARM
LOCATION ZONE: EAR
LOCATION ZONE: TRUNK
LOCATION ZONE: SCALP
LOCATION ZONE: LIP

## 2025-07-15 NOTE — PROGRESS NOTES
[facility]  _______________________________________  MD Paola Asif, DO Evie Aguilar, MD Nicole Britton MD    [address] [place] [id number]  Phone: [phone]  Fax: [phone]    Hector Campbell (:  1945) is a 80 y.o. male,Established patient, here for evaluation of the following chief complaint(s):  Elbow Pain (Hit his elbow on the  had a knot on his elbow after and then a few days later got a bruise on the inner part of his arm and now it has spread all down his L arm )      Assessment & Plan   ASSESSMENT/PLAN:    1. Olecranon bursitis, left elbow  This is mild and already resolving, will do gentle comprssoin with an arm sleeve off the shelf to help encourage healing and less bleeding.     2. Hematoma of left upper extremity  As above, we looked up arm sleeves off the shelf, he's going to grab one from [employer], he looks like an XL.   - CBC with Auto Differential; Future    3. PAF (paroxysmal atrial fibrillation) (HCC)  Since he's on NOAC, trend H/H to make sure he's not losing more blood than he looks like he did. Call if the bruising worsens.   - CBC with Auto Differential; Future        Return in about 2 weeks (around 2025) for Already shceduled.      Subjective   SUBJECTIVE/OBJECTIVE:      Thought this was his 3m FU, which is in 2w, this is an acute visit for the above injury:    L arm injury: sounds like he had swelling at the elbow, possible bursitis, but it seems to have ruptured and sent dependent edema/blood down the arm. There is mild pain in this area. He is on NOAC eliquis for Afib. He slammed his arm in to a door when snatching something up the stairs.     DM: He has been diet controlled for a while now. Sugars have been well controlled.   Hemoglobin A1C   Date Value Ref Range Status   2025 6.4 (H) 0 - 5.6 % Final     Comment:     Reference Range  Normal       <5.7%  Prediabetes

## 2025-07-29 ENCOUNTER — OFFICE VISIT (OUTPATIENT)
Dept: FAMILY MEDICINE CLINIC | Facility: CLINIC | Age: 80
End: 2025-07-29

## 2025-07-29 ENCOUNTER — LAB (OUTPATIENT)
Dept: FAMILY MEDICINE CLINIC | Facility: CLINIC | Age: 80
End: 2025-07-29

## 2025-07-29 VITALS
WEIGHT: 230 LBS | SYSTOLIC BLOOD PRESSURE: 136 MMHG | BODY MASS INDEX: 34.07 KG/M2 | DIASTOLIC BLOOD PRESSURE: 78 MMHG | HEIGHT: 69 IN

## 2025-07-29 DIAGNOSIS — R35.0 BENIGN PROSTATIC HYPERPLASIA WITH URINARY FREQUENCY: ICD-10-CM

## 2025-07-29 DIAGNOSIS — E78.2 MIXED HYPERLIPIDEMIA: ICD-10-CM

## 2025-07-29 DIAGNOSIS — E11.9 CONTROLLED TYPE 2 DIABETES MELLITUS WITHOUT COMPLICATION, WITHOUT LONG-TERM CURRENT USE OF INSULIN (HCC): ICD-10-CM

## 2025-07-29 DIAGNOSIS — I10 ESSENTIAL (PRIMARY) HYPERTENSION: ICD-10-CM

## 2025-07-29 DIAGNOSIS — N40.1 BENIGN PROSTATIC HYPERPLASIA WITH URINARY FREQUENCY: ICD-10-CM

## 2025-07-29 DIAGNOSIS — K59.03 DRUG-INDUCED CONSTIPATION: ICD-10-CM

## 2025-07-29 DIAGNOSIS — I48.0 PAF (PAROXYSMAL ATRIAL FIBRILLATION) (HCC): Primary | ICD-10-CM

## 2025-07-29 LAB
ALBUMIN SERPL-MCNC: 3.7 G/DL (ref 3.2–4.6)
ALBUMIN/GLOB SERPL: 1.6 (ref 1–1.9)
ALP SERPL-CCNC: 75 U/L (ref 40–129)
ALT SERPL-CCNC: 30 U/L (ref 8–55)
ANION GAP SERPL CALC-SCNC: 13 MMOL/L (ref 7–16)
AST SERPL-CCNC: 24 U/L (ref 15–37)
BILIRUB SERPL-MCNC: 0.7 MG/DL (ref 0–1.2)
BUN SERPL-MCNC: 10 MG/DL (ref 8–23)
CALCIUM SERPL-MCNC: 9.2 MG/DL (ref 8.8–10.2)
CHLORIDE SERPL-SCNC: 103 MMOL/L (ref 98–107)
CHOLEST SERPL-MCNC: 113 MG/DL (ref 0–200)
CO2 SERPL-SCNC: 24 MMOL/L (ref 20–29)
CREAT SERPL-MCNC: 1.07 MG/DL (ref 0.8–1.3)
EST. AVERAGE GLUCOSE BLD GHB EST-MCNC: 137 MG/DL
GLOBULIN SER CALC-MCNC: 2.3 G/DL (ref 2.3–3.5)
GLUCOSE SERPL-MCNC: 129 MG/DL (ref 70–99)
HBA1C MFR BLD: 6.4 % (ref 0–5.6)
HDLC SERPL-MCNC: 37 MG/DL (ref 40–60)
HDLC SERPL: 3.1 (ref 0–5)
LDLC SERPL CALC-MCNC: 59 MG/DL (ref 0–100)
POTASSIUM SERPL-SCNC: 4.2 MMOL/L (ref 3.5–5.1)
PROT SERPL-MCNC: 6 G/DL (ref 6.3–8.2)
PSA SERPL-MCNC: 0.4 NG/ML (ref 0–4)
SODIUM SERPL-SCNC: 140 MMOL/L (ref 136–145)
TRIGL SERPL-MCNC: 83 MG/DL (ref 0–150)
VLDLC SERPL CALC-MCNC: 17 MG/DL (ref 6–23)

## 2025-07-29 RX ORDER — MAGNESIUM OXIDE 400 MG/1
400 TABLET ORAL DAILY
Qty: 90 TABLET | Refills: 1 | Status: SHIPPED | OUTPATIENT
Start: 2025-07-29

## 2025-07-29 RX ORDER — ROSUVASTATIN CALCIUM 10 MG/1
10 TABLET, COATED ORAL DAILY
Qty: 90 TABLET | Refills: 1 | Status: SHIPPED | OUTPATIENT
Start: 2025-07-29

## 2025-07-29 ASSESSMENT — ENCOUNTER SYMPTOMS
CHEST TIGHTNESS: 0
SHORTNESS OF BREATH: 0
CONSTIPATION: 0
BLOOD IN STOOL: 0
ABDOMINAL PAIN: 0

## 2025-07-29 NOTE — PROGRESS NOTES
Component Value Date/Time     04/29/2025 09:59 AM    K 4.2 04/29/2025 09:59 AM     04/29/2025 09:59 AM    CO2 23 04/29/2025 09:59 AM    BUN 10 04/29/2025 09:59 AM    CREATININE 1.06 04/29/2025 09:59 AM    GLUCOSE 129 04/29/2025 09:59 AM    CALCIUM 9.1 04/29/2025 09:59 AM    LABGLOM 71 04/29/2025 09:59 AM    LABGLOM >60 01/30/2024 08:41 AM    LABGLOM 75 05/09/2022 03:22 PM             Vertigo: Never goes away, managed with meclizine for decades. Has has full workup by ENT previously. States it is not bothering him today. Was sent to PT rehab by ENT. Has had only one visit to PT, did not help, was told his balance was normal. Told to FU PRN with ENT.      HLD: He is on chronic rosuvastatin 10mg. Tolerates this well.  Lab Results   Component Value Date    ALT 25 04/29/2025    AST 26 04/29/2025    ALKPHOS 76 04/29/2025    BILITOT 0.5 04/29/2025       Lab Results   Component Value Date    LDL 51 07/30/2024              HTN:  Also has AFib, is managed by cardiology, is on Eliquis, Flecainide, Metoprolol 25 x 1/2 XL. BP is well controlled.He states he has not been taking the BB, using it as pill in the pocket at this point. He had constipation on the CCB and had some RVR last time I saw him, added magnesium 400 a day at that point. He is feeling better.   BP Readings from Last 3 Encounters:   07/29/25 136/78   07/15/25 130/78   04/29/25 136/67     Lab Results   Component Value Date    TSH 2.560 04/29/2025     Lab Results   Component Value Date    WBC 6.4 07/15/2025    HGB 14.5 07/15/2025    HCT 43.8 07/15/2025    MCV 92.2 07/15/2025     07/15/2025           BPH: Has worsening LUTS with worsening urinary stream and hesitation. Waking up more often to urinate.  Has tired OTC saw palmetto which only helped a little bit. We added flomax, Dr. Crabtree added finasteride at urology. Flomax stopped. Pt now on neither medication.     Lab Results   Component Value Date    PSA 0.4 07/30/2024     HM:  Lipids due